# Patient Record
Sex: FEMALE | Race: WHITE | Employment: OTHER | URBAN - NONMETROPOLITAN AREA
[De-identification: names, ages, dates, MRNs, and addresses within clinical notes are randomized per-mention and may not be internally consistent; named-entity substitution may affect disease eponyms.]

---

## 2017-03-15 DIAGNOSIS — J30.2 SEASONAL ALLERGIES: ICD-10-CM

## 2017-03-16 DIAGNOSIS — E78.00 PURE HYPERCHOLESTEROLEMIA: ICD-10-CM

## 2017-03-16 RX ORDER — FLUTICASONE PROPIONATE 50 MCG
SPRAY, SUSPENSION (ML) NASAL
Qty: 48 G | Refills: 2 | Status: SHIPPED | OUTPATIENT
Start: 2017-03-16 | End: 2018-02-13 | Stop reason: SDUPTHER

## 2017-03-16 RX ORDER — ROSUVASTATIN CALCIUM 5 MG/1
TABLET, COATED ORAL
Qty: 30 TABLET | Refills: 11 | Status: SHIPPED | OUTPATIENT
Start: 2017-03-16 | End: 2018-03-14 | Stop reason: SDUPTHER

## 2017-03-22 ENCOUNTER — OFFICE VISIT (OUTPATIENT)
Dept: FAMILY MEDICINE CLINIC | Age: 62
End: 2017-03-22

## 2017-03-22 VITALS
WEIGHT: 136 LBS | DIASTOLIC BLOOD PRESSURE: 86 MMHG | SYSTOLIC BLOOD PRESSURE: 128 MMHG | BODY MASS INDEX: 24.4 KG/M2 | RESPIRATION RATE: 8 BRPM | HEART RATE: 88 BPM

## 2017-03-22 DIAGNOSIS — M54.9 ACUTE RIGHT-SIDED BACK PAIN, UNSPECIFIED BACK LOCATION: Primary | ICD-10-CM

## 2017-03-22 DIAGNOSIS — F41.9 ANXIETY: ICD-10-CM

## 2017-03-22 DIAGNOSIS — I49.8 FLUTTERING HEART: ICD-10-CM

## 2017-03-22 LAB
BILIRUBIN, POC: NEGATIVE
BLOOD URINE, POC: NEGATIVE
CLARITY, POC: CLEAR
COLOR, POC: NORMAL
GLUCOSE URINE, POC: NEGATIVE
KETONES, POC: NEGATIVE
LEUKOCYTE EST, POC: NEGATIVE
NITRITE, POC: NEGATIVE
PH, POC: 7
PROTEIN, POC: NEGATIVE
SPECIFIC GRAVITY, POC: 1.01
UROBILINOGEN, POC: 0.2

## 2017-03-22 PROCEDURE — 81003 URINALYSIS AUTO W/O SCOPE: CPT | Performed by: FAMILY MEDICINE

## 2017-03-22 PROCEDURE — 99214 OFFICE O/P EST MOD 30 MIN: CPT | Performed by: FAMILY MEDICINE

## 2017-03-22 PROCEDURE — 93000 ELECTROCARDIOGRAM COMPLETE: CPT | Performed by: FAMILY MEDICINE

## 2017-03-22 RX ORDER — CETIRIZINE HYDROCHLORIDE 5 MG/1
5 TABLET ORAL DAILY
COMMUNITY

## 2017-03-22 RX ORDER — CITALOPRAM 10 MG/1
10 TABLET ORAL DAILY
Qty: 30 TABLET | Refills: 3 | Status: SHIPPED | OUTPATIENT
Start: 2017-03-22 | End: 2017-06-07 | Stop reason: ALTCHOICE

## 2017-03-22 ASSESSMENT — PATIENT HEALTH QUESTIONNAIRE - PHQ9
SUM OF ALL RESPONSES TO PHQ QUESTIONS 1-9: 1
SUM OF ALL RESPONSES TO PHQ9 QUESTIONS 1 & 2: 1
2. FEELING DOWN, DEPRESSED OR HOPELESS: 1
1. LITTLE INTEREST OR PLEASURE IN DOING THINGS: 0

## 2017-03-24 ENCOUNTER — TELEPHONE (OUTPATIENT)
Dept: FAMILY MEDICINE CLINIC | Age: 62
End: 2017-03-24

## 2017-06-07 ENCOUNTER — OFFICE VISIT (OUTPATIENT)
Dept: FAMILY MEDICINE CLINIC | Age: 62
End: 2017-06-07

## 2017-06-07 VITALS
HEART RATE: 80 BPM | DIASTOLIC BLOOD PRESSURE: 80 MMHG | BODY MASS INDEX: 24.27 KG/M2 | RESPIRATION RATE: 8 BRPM | SYSTOLIC BLOOD PRESSURE: 140 MMHG | WEIGHT: 137 LBS | HEIGHT: 63 IN

## 2017-06-07 DIAGNOSIS — K21.9 GASTROESOPHAGEAL REFLUX DISEASE WITHOUT ESOPHAGITIS: ICD-10-CM

## 2017-06-07 DIAGNOSIS — M41.25 OTHER IDIOPATHIC SCOLIOSIS, THORACOLUMBAR REGION: Primary | ICD-10-CM

## 2017-06-07 DIAGNOSIS — K59.09 OTHER CONSTIPATION: ICD-10-CM

## 2017-06-07 DIAGNOSIS — F41.9 ANXIETY: ICD-10-CM

## 2017-06-07 PROCEDURE — 99214 OFFICE O/P EST MOD 30 MIN: CPT | Performed by: FAMILY MEDICINE

## 2017-06-07 RX ORDER — SUCRALFATE 1 G/1
1 TABLET ORAL 2 TIMES DAILY
Qty: 120 TABLET | Refills: 11 | Status: SHIPPED | OUTPATIENT
Start: 2017-06-07 | End: 2019-09-30 | Stop reason: SDUPTHER

## 2017-09-13 ENCOUNTER — OFFICE VISIT (OUTPATIENT)
Dept: FAMILY MEDICINE CLINIC | Age: 62
End: 2017-09-13
Payer: COMMERCIAL

## 2017-09-13 VITALS
WEIGHT: 137.6 LBS | RESPIRATION RATE: 12 BRPM | BODY MASS INDEX: 24.38 KG/M2 | HEART RATE: 78 BPM | SYSTOLIC BLOOD PRESSURE: 117 MMHG | DIASTOLIC BLOOD PRESSURE: 70 MMHG | HEIGHT: 63 IN

## 2017-09-13 DIAGNOSIS — Z11.59 NEED FOR HEPATITIS C SCREENING TEST: ICD-10-CM

## 2017-09-13 DIAGNOSIS — J30.1 SEASONAL ALLERGIC RHINITIS DUE TO POLLEN: ICD-10-CM

## 2017-09-13 DIAGNOSIS — L30.1 DYSHIDROSIS: ICD-10-CM

## 2017-09-13 DIAGNOSIS — Z23 NEEDS FLU SHOT: ICD-10-CM

## 2017-09-13 DIAGNOSIS — K21.9 GASTROESOPHAGEAL REFLUX DISEASE WITHOUT ESOPHAGITIS: ICD-10-CM

## 2017-09-13 DIAGNOSIS — I10 ESSENTIAL HYPERTENSION: ICD-10-CM

## 2017-09-13 DIAGNOSIS — Z00.00 ROUTINE GENERAL MEDICAL EXAMINATION AT A HEALTH CARE FACILITY: ICD-10-CM

## 2017-09-13 DIAGNOSIS — Z11.4 SCREENING FOR HIV (HUMAN IMMUNODEFICIENCY VIRUS): ICD-10-CM

## 2017-09-13 DIAGNOSIS — H61.23 BILATERAL IMPACTED CERUMEN: ICD-10-CM

## 2017-09-13 DIAGNOSIS — M15.9 PRIMARY OSTEOARTHRITIS INVOLVING MULTIPLE JOINTS: ICD-10-CM

## 2017-09-13 DIAGNOSIS — E78.00 PURE HYPERCHOLESTEROLEMIA: Primary | ICD-10-CM

## 2017-09-13 PROCEDURE — 90686 IIV4 VACC NO PRSV 0.5 ML IM: CPT | Performed by: FAMILY MEDICINE

## 2017-09-13 PROCEDURE — 69209 REMOVE IMPACTED EAR WAX UNI: CPT | Performed by: FAMILY MEDICINE

## 2017-09-13 PROCEDURE — 90471 IMMUNIZATION ADMIN: CPT | Performed by: FAMILY MEDICINE

## 2017-09-13 PROCEDURE — 99214 OFFICE O/P EST MOD 30 MIN: CPT | Performed by: FAMILY MEDICINE

## 2017-09-13 RX ORDER — CLOTRIMAZOLE AND BETAMETHASONE DIPROPIONATE 10; .64 MG/G; MG/G
CREAM TOPICAL
Qty: 30 G | Refills: 2 | Status: SHIPPED | OUTPATIENT
Start: 2017-09-13 | End: 2018-08-24 | Stop reason: ALTCHOICE

## 2017-09-14 ENCOUNTER — NURSE ONLY (OUTPATIENT)
Dept: FAMILY MEDICINE CLINIC | Age: 62
End: 2017-09-14
Payer: COMMERCIAL

## 2017-09-14 DIAGNOSIS — Z00.00 ROUTINE GENERAL MEDICAL EXAMINATION AT A HEALTH CARE FACILITY: ICD-10-CM

## 2017-09-14 DIAGNOSIS — Z11.4 SCREENING FOR HIV (HUMAN IMMUNODEFICIENCY VIRUS): ICD-10-CM

## 2017-09-14 DIAGNOSIS — Z11.59 NEED FOR HEPATITIS C SCREENING TEST: ICD-10-CM

## 2017-09-14 DIAGNOSIS — E78.00 PURE HYPERCHOLESTEROLEMIA: ICD-10-CM

## 2017-09-14 LAB
ALBUMIN SERPL-MCNC: 4.1 G/DL (ref 3.5–5.1)
ALP BLD-CCNC: 58 U/L (ref 38–126)
ALT SERPL-CCNC: 24 U/L (ref 11–66)
ANION GAP SERPL CALCULATED.3IONS-SCNC: 14 MEQ/L (ref 8–16)
AST SERPL-CCNC: 21 U/L (ref 5–40)
BILIRUB SERPL-MCNC: 0.3 MG/DL (ref 0.3–1.2)
BUN BLDV-MCNC: 14 MG/DL (ref 7–22)
CALCIUM SERPL-MCNC: 8.7 MG/DL (ref 8.5–10.5)
CHLORIDE BLD-SCNC: 103 MEQ/L (ref 98–111)
CHOLESTEROL, TOTAL: 176 MG/DL (ref 100–199)
CO2: 24 MEQ/L (ref 23–33)
CREAT SERPL-MCNC: 0.6 MG/DL (ref 0.4–1.2)
GFR SERPL CREATININE-BSD FRML MDRD: > 90 ML/MIN/1.73M2
GLUCOSE BLD-MCNC: 105 MG/DL (ref 70–108)
HCT VFR BLD CALC: 42 % (ref 37–47)
HDLC SERPL-MCNC: 65 MG/DL
HEMOGLOBIN: 14.3 GM/DL (ref 12–16)
HEPATITIS C ANTIBODY: NEGATIVE
LDL CHOLESTEROL CALCULATED: 95 MG/DL
MCH RBC QN AUTO: 31.7 PG (ref 27–31)
MCHC RBC AUTO-ENTMCNC: 34.1 GM/DL (ref 33–37)
MCV RBC AUTO: 92.9 FL (ref 81–99)
PDW BLD-RTO: 13 % (ref 11.5–14.5)
PLATELET # BLD: 205 THOU/MM3 (ref 130–400)
PMV BLD AUTO: 7.6 MCM (ref 7.4–10.4)
POTASSIUM SERPL-SCNC: 4.3 MEQ/L (ref 3.5–5.2)
RBC # BLD: 4.52 MILL/MM3 (ref 4.2–5.4)
SODIUM BLD-SCNC: 141 MEQ/L (ref 135–145)
TOTAL PROTEIN: 6.6 G/DL (ref 6.1–8)
TRIGL SERPL-MCNC: 80 MG/DL (ref 0–199)
TSH SERPL DL<=0.05 MIU/L-ACNC: 1.74 UIU/ML (ref 0.4–4.2)
WBC # BLD: 4.6 THOU/MM3 (ref 4.8–10.8)

## 2017-09-14 PROCEDURE — 36415 COLL VENOUS BLD VENIPUNCTURE: CPT | Performed by: FAMILY MEDICINE

## 2017-09-15 LAB — HIV-2 AB: NEGATIVE

## 2018-03-07 ENCOUNTER — HOSPITAL ENCOUNTER (OUTPATIENT)
Dept: WOMENS IMAGING | Age: 63
Discharge: HOME | End: 2018-03-07
Payer: COMMERCIAL

## 2018-03-07 DIAGNOSIS — Z12.31 VISIT FOR SCREENING MAMMOGRAM: ICD-10-CM

## 2018-03-07 PROCEDURE — 77063 BREAST TOMOSYNTHESIS BI: CPT

## 2018-03-14 ENCOUNTER — OFFICE VISIT (OUTPATIENT)
Dept: FAMILY MEDICINE CLINIC | Age: 63
End: 2018-03-14
Payer: COMMERCIAL

## 2018-03-14 VITALS
OXYGEN SATURATION: 97 % | BODY MASS INDEX: 25.25 KG/M2 | SYSTOLIC BLOOD PRESSURE: 118 MMHG | HEIGHT: 62 IN | RESPIRATION RATE: 16 BRPM | HEART RATE: 81 BPM | WEIGHT: 137.2 LBS | DIASTOLIC BLOOD PRESSURE: 60 MMHG

## 2018-03-14 DIAGNOSIS — I10 ESSENTIAL HYPERTENSION: Primary | ICD-10-CM

## 2018-03-14 DIAGNOSIS — J30.1 CHRONIC SEASONAL ALLERGIC RHINITIS DUE TO POLLEN: ICD-10-CM

## 2018-03-14 DIAGNOSIS — K21.9 GASTROESOPHAGEAL REFLUX DISEASE WITHOUT ESOPHAGITIS: ICD-10-CM

## 2018-03-14 DIAGNOSIS — L30.1 DYSHIDROSIS: ICD-10-CM

## 2018-03-14 DIAGNOSIS — M15.9 PRIMARY OSTEOARTHRITIS INVOLVING MULTIPLE JOINTS: ICD-10-CM

## 2018-03-14 DIAGNOSIS — Z00.00 ROUTINE GENERAL MEDICAL EXAMINATION AT A HEALTH CARE FACILITY: ICD-10-CM

## 2018-03-14 DIAGNOSIS — E78.00 PURE HYPERCHOLESTEROLEMIA: ICD-10-CM

## 2018-03-14 PROCEDURE — G8419 CALC BMI OUT NRM PARAM NOF/U: HCPCS | Performed by: FAMILY MEDICINE

## 2018-03-14 PROCEDURE — 99214 OFFICE O/P EST MOD 30 MIN: CPT | Performed by: FAMILY MEDICINE

## 2018-03-14 PROCEDURE — G8482 FLU IMMUNIZE ORDER/ADMIN: HCPCS | Performed by: FAMILY MEDICINE

## 2018-03-14 PROCEDURE — 3017F COLORECTAL CA SCREEN DOC REV: CPT | Performed by: FAMILY MEDICINE

## 2018-03-14 PROCEDURE — G8427 DOCREV CUR MEDS BY ELIG CLIN: HCPCS | Performed by: FAMILY MEDICINE

## 2018-03-14 PROCEDURE — 1036F TOBACCO NON-USER: CPT | Performed by: FAMILY MEDICINE

## 2018-03-14 PROCEDURE — 3014F SCREEN MAMMO DOC REV: CPT | Performed by: FAMILY MEDICINE

## 2018-03-14 RX ORDER — ROSUVASTATIN CALCIUM 5 MG/1
TABLET, COATED ORAL
Qty: 90 TABLET | Refills: 11 | Status: SHIPPED | OUTPATIENT
Start: 2018-03-14 | End: 2019-07-17 | Stop reason: SINTOL

## 2018-03-15 NOTE — PROGRESS NOTES
pressure. These symptoms are seasonal. Current triggers include exposure to pollens. The patient has been suffering from these symptoms for approximately 3 months. The patient has tried over the counter medications with fair relief of symptoms. Immunotherapy has never been tried. The patient has never had nasal polyps. The patient has no history of asthma. The patient does not suffer from frequent sinopulmonary infections. The patient has not had sinus surgery in the past. The patient has no history of eczema. Arthritis is controlled with NSAIDs prn. Dry hands: Dyshidrosis is controlled with lotrisone but she needs a refill. GERD is controlled on PPI. Reviewed chart for past medical history , surgical history , allergies, social history , family history and medications. Health Maintenance   Topic Date Due    DTaP/Tdap/Td vaccine (1 - Tdap) 06/03/1972    Shingles Vaccine (1 of 2 - 2 Dose Series) 04/02/2018 (Originally 6/3/2003)    Potassium monitoring  09/14/2018    Creatinine monitoring  09/14/2018    Diabetes screen  05/14/2019    Breast cancer screen  03/07/2020    Colon cancer screen colonoscopy  08/02/2022    Lipid screen  09/14/2022    Flu vaccine  Completed    Hepatitis C screen  Completed    HIV screen  Completed       Subjective:      Constitutional: Negative for fever, chills, diaphoresis, activity change, appetite change and fatigue. HENT: Negative for hearing loss, ear pain, congestion, sore throat, rhinorrhea, postnasal drip and ear discharge. Eyes: Negative for photophobia and visual disturbance. Respiratory: Negative for cough, chest tightness, shortness of breath and wheezing. Cardiovascular: Negative for chest pain and leg swelling. Gastrointestinal: Negative for nausea, vomiting, abdominal pain, diarrhea and constipation. Genitourinary: Negative for dysuria, urgency and frequency. Neurological: Negative for weakness, light-headedness and headaches. Psychiatric/Behavioral: Negative for sleep disturbance. Objective:     Vitals:    03/14/18 1035   BP: 118/60   Site: Left Arm   Position: Sitting   Cuff Size: Small Adult   Pulse: 81   Resp: 16   SpO2: 97%   Weight: 137 lb 3.2 oz (62.2 kg)   Height: 5' 2\" (1.575 m)     Wt Readings from Last 3 Encounters:   03/14/18 137 lb 3.2 oz (62.2 kg)   09/13/17 137 lb 9.6 oz (62.4 kg)   06/07/17 137 lb (62.1 kg)       Physical Exam  Physical Exam   Constitutional: Vital signs are normal. She appears well-developed and well-nourished. She is active. HENT:   Head: Normocephalic and atraumatic. Right Ear: Tympanic membrane, external ear and ear canal normal. No drainage or tenderness. Left Ear: Tympanic membrane, external ear and ear canal normal. No drainage or tenderness. Nose: Nose normal. No mucosal edema or rhinorrhea. Mouth/Throat: Uvula is midline, oropharynx is clear and moist and mucous membranes are normal. Mucous membranes are not pale. Normal dentition. No posterior oropharyngeal edema or posterior oropharyngeal erythema. Eyes: Lids are normal. Right eye exhibits no chemosis and no discharge. Left eye exhibits no chemosis and no drainage. Right conjunctiva has no hemorrhage. Left conjunctiva has no hemorrhage. Right eye exhibits normal extraocular motion. Left eye exhibits normal extraocular motion. Right pupil is round and reactive. Left pupil is round and reactive. Pupils are equal.   Cardiovascular: Normal rate, regular rhythm, S1 normal, S2 normal and normal heart sounds. Exam reveals no gallop. No murmur heard. Pulmonary/Chest: Effort normal and breath sounds normal. No respiratory distress. She has no wheezes. She has no rhonchi. She has no rales. Abdominal: Soft. Normal appearance and bowel sounds are normal. She exhibits no distension and no mass. There is no hepatosplenomegaly. No tenderness. She has no rigidity, no rebound and no guarding. No hernia.    Musculoskeletal:        Right

## 2018-04-04 ENCOUNTER — HOSPITAL ENCOUNTER (OUTPATIENT)
Dept: WOMENS IMAGING | Age: 63
Discharge: HOME | End: 2018-04-04
Payer: COMMERCIAL

## 2018-04-04 DIAGNOSIS — R29.890 LOSS OF HEIGHT: ICD-10-CM

## 2018-04-04 DIAGNOSIS — Z13.820 SCREENING FOR OSTEOPOROSIS: ICD-10-CM

## 2018-04-04 DIAGNOSIS — Z82.62 FAM HX-OSTEOPOROSIS: ICD-10-CM

## 2018-04-04 DIAGNOSIS — Z78.0 POSTMENOPAUSAL: ICD-10-CM

## 2018-04-04 PROCEDURE — 77080 DXA BONE DENSITY AXIAL: CPT

## 2018-05-30 ENCOUNTER — NURSE ONLY (OUTPATIENT)
Dept: FAMILY MEDICINE CLINIC | Age: 63
End: 2018-05-30
Payer: COMMERCIAL

## 2018-05-30 DIAGNOSIS — M85.80 OSTEOPENIA DETERMINED BY X-RAY: Primary | ICD-10-CM

## 2018-05-30 LAB — CALCIUM SERPL-MCNC: 9.1 MG/DL (ref 8.5–10.5)

## 2018-05-30 PROCEDURE — 36415 COLL VENOUS BLD VENIPUNCTURE: CPT | Performed by: FAMILY MEDICINE

## 2018-06-01 LAB — VITAMIN D 1,25-DIHYDROXY: 54.2 PG/ML (ref 19.9–79.3)

## 2018-07-03 ENCOUNTER — OFFICE VISIT (OUTPATIENT)
Dept: FAMILY MEDICINE CLINIC | Age: 63
End: 2018-07-03
Payer: MEDICARE

## 2018-07-03 VITALS
HEIGHT: 62 IN | SYSTOLIC BLOOD PRESSURE: 122 MMHG | BODY MASS INDEX: 24.69 KG/M2 | DIASTOLIC BLOOD PRESSURE: 84 MMHG | TEMPERATURE: 97.9 F | OXYGEN SATURATION: 98 % | WEIGHT: 134.2 LBS | HEART RATE: 78 BPM | RESPIRATION RATE: 14 BRPM

## 2018-07-03 DIAGNOSIS — R10.11 ABDOMINAL PAIN, RUQ: Primary | ICD-10-CM

## 2018-07-03 DIAGNOSIS — K59.09 OTHER CONSTIPATION: ICD-10-CM

## 2018-07-03 DIAGNOSIS — E78.00 PURE HYPERCHOLESTEROLEMIA: ICD-10-CM

## 2018-07-03 DIAGNOSIS — M15.9 PRIMARY OSTEOARTHRITIS INVOLVING MULTIPLE JOINTS: ICD-10-CM

## 2018-07-03 DIAGNOSIS — J30.1 CHRONIC SEASONAL ALLERGIC RHINITIS DUE TO POLLEN: ICD-10-CM

## 2018-07-03 DIAGNOSIS — K21.9 GASTROESOPHAGEAL REFLUX DISEASE WITHOUT ESOPHAGITIS: ICD-10-CM

## 2018-07-03 LAB
ALBUMIN SERPL-MCNC: 4.1 G/DL (ref 3.5–5.1)
ALP BLD-CCNC: 59 U/L (ref 38–126)
ALT SERPL-CCNC: 22 U/L (ref 11–66)
AMYLASE: 72 U/L (ref 20–104)
ANION GAP SERPL CALCULATED.3IONS-SCNC: 17 MEQ/L (ref 8–16)
AST SERPL-CCNC: 21 U/L (ref 5–40)
BILIRUB SERPL-MCNC: 0.3 MG/DL (ref 0.3–1.2)
BILIRUBIN URINE: NEGATIVE
BLOOD URINE, POC: NEGATIVE
BUN BLDV-MCNC: 13 MG/DL (ref 7–22)
CALCIUM SERPL-MCNC: 9.8 MG/DL (ref 8.5–10.5)
CHARACTER, URINE: CLEAR
CHLORIDE BLD-SCNC: 103 MEQ/L (ref 98–111)
CO2: 25 MEQ/L (ref 23–33)
COLOR, URINE: YELLOW
CREAT SERPL-MCNC: 0.6 MG/DL (ref 0.4–1.2)
ERYTHROCYTE [DISTWIDTH] IN BLOOD BY AUTOMATED COUNT: 12.5 % (ref 11.5–14.5)
ERYTHROCYTE [DISTWIDTH] IN BLOOD BY AUTOMATED COUNT: 43.2 FL (ref 35–45)
GFR SERPL CREATININE-BSD FRML MDRD: > 90 ML/MIN/1.73M2
GLUCOSE BLD-MCNC: 81 MG/DL (ref 70–108)
GLUCOSE URINE: NEGATIVE MG/DL
HCT VFR BLD CALC: 43.2 % (ref 37–47)
HEMOGLOBIN: 14.4 GM/DL (ref 12–16)
KETONES, URINE: NEGATIVE
LEUKOCYTE CLUMPS, URINE: NEGATIVE
LIPASE: 43.9 U/L (ref 5.6–51.3)
MCH RBC QN AUTO: 31.3 PG (ref 26–33)
MCHC RBC AUTO-ENTMCNC: 33.3 GM/DL (ref 32.2–35.5)
MCV RBC AUTO: 93.9 FL (ref 81–99)
NITRITE, URINE: NEGATIVE
PH, URINE: 6
PLATELET # BLD: 271 THOU/MM3 (ref 130–400)
PMV BLD AUTO: 9.7 FL (ref 9.4–12.4)
POTASSIUM SERPL-SCNC: 4.4 MEQ/L (ref 3.5–5.2)
PROTEIN, URINE: NEGATIVE MG/DL
RBC # BLD: 4.6 MILL/MM3 (ref 4.2–5.4)
SODIUM BLD-SCNC: 145 MEQ/L (ref 135–145)
SPECIFIC GRAVITY, URINE: 1.01 (ref 1–1.03)
TOTAL PROTEIN: 7.1 G/DL (ref 6.1–8)
UROBILINOGEN, URINE: 0.2 EU/DL
WBC # BLD: 7 THOU/MM3 (ref 4.8–10.8)

## 2018-07-03 PROCEDURE — 1036F TOBACCO NON-USER: CPT | Performed by: FAMILY MEDICINE

## 2018-07-03 PROCEDURE — G8399 PT W/DXA RESULTS DOCUMENT: HCPCS | Performed by: FAMILY MEDICINE

## 2018-07-03 PROCEDURE — 81003 URINALYSIS AUTO W/O SCOPE: CPT | Performed by: FAMILY MEDICINE

## 2018-07-03 PROCEDURE — G8427 DOCREV CUR MEDS BY ELIG CLIN: HCPCS | Performed by: FAMILY MEDICINE

## 2018-07-03 PROCEDURE — G8420 CALC BMI NORM PARAMETERS: HCPCS | Performed by: FAMILY MEDICINE

## 2018-07-03 PROCEDURE — 4040F PNEUMOC VAC/ADMIN/RCVD: CPT | Performed by: FAMILY MEDICINE

## 2018-07-03 PROCEDURE — 1123F ACP DISCUSS/DSCN MKR DOCD: CPT | Performed by: FAMILY MEDICINE

## 2018-07-03 PROCEDURE — 99215 OFFICE O/P EST HI 40 MIN: CPT | Performed by: FAMILY MEDICINE

## 2018-07-03 PROCEDURE — 1090F PRES/ABSN URINE INCON ASSESS: CPT | Performed by: FAMILY MEDICINE

## 2018-07-03 PROCEDURE — 3017F COLORECTAL CA SCREEN DOC REV: CPT | Performed by: FAMILY MEDICINE

## 2018-07-03 RX ORDER — OMEPRAZOLE 40 MG/1
40 CAPSULE, DELAYED RELEASE ORAL DAILY
Qty: 30 CAPSULE | Refills: 5 | Status: SHIPPED | OUTPATIENT
Start: 2018-07-03 | End: 2019-07-17 | Stop reason: SDUPTHER

## 2018-07-03 ASSESSMENT — PATIENT HEALTH QUESTIONNAIRE - PHQ9
SUM OF ALL RESPONSES TO PHQ QUESTIONS 1-9: 0
1. LITTLE INTEREST OR PLEASURE IN DOING THINGS: 0
SUM OF ALL RESPONSES TO PHQ9 QUESTIONS 1 & 2: 0
2. FEELING DOWN, DEPRESSED OR HOPELESS: 0

## 2018-07-06 NOTE — PROGRESS NOTES
Giodia Lopez is here for evaluation of possible allergic rhinitis. Patient's symptoms include nasal congestion, postnasal drip and sinus pressure. These symptoms are seasonal. Current triggers include exposure to pollens. The patient has been suffering from these symptoms for approximately 3 months. The patient has tried over the counter medications with fair relief of symptoms. Immunotherapy has never been tried. The patient has never had nasal polyps. The patient has no history of asthma. The patient does not suffer from frequent sinopulmonary infections. The patient has not had sinus surgery in the past. The patient has no history of eczema. Arthritis is controlled with NSAIDs prn. However, she has noticed increased left thumb pain on and off the past year. Occasionally the left great toe will also flare up but gets better with walking and rest later in the day. She is worried about gout but the symptoms are reviewed with her and it is more likely osteoarthritis. GERD is controlled on PPI but she also has a fair amount of constipation leading to nausea and difficulty getting a urinary stream started. Reviewed chart for past medical history , surgical history , allergies, social history , family history and medications. Health Maintenance   Topic Date Due    DTaP/Tdap/Td vaccine (1 - Tdap) 06/03/1972    Shingles Vaccine (1 of 2 - 2 Dose Series) 06/03/2003    Pneumococcal low/med risk (1 of 2 - PCV13) 06/03/2018    Flu vaccine (1) 09/01/2018    Potassium monitoring  07/03/2019    Creatinine monitoring  07/03/2019    Breast cancer screen  03/07/2020    Colon cancer screen colonoscopy  08/02/2022    Lipid screen  09/14/2022    DEXA (modify frequency per FRAX score)  Completed    Hepatitis C screen  Completed    HIV screen  Completed       Subjective:      Constitutional: Negative for fever, chills, diaphoresis, activity change, appetite change and fatigue.    HENT: Negative for hearing loss, ear pain, congestion, sore throat, rhinorrhea, postnasal drip and ear discharge. Eyes: Negative for photophobia and visual disturbance. Respiratory: Negative for cough, chest tightness, shortness of breath and wheezing. Cardiovascular: Negative for chest pain and leg swelling. Gastrointestinal: Negative for nausea, vomiting, abdominal pain, diarrhea and constipation. Genitourinary: Negative for dysuria, urgency and frequency. Neurological: Negative for weakness, light-headedness and headaches. Psychiatric/Behavioral: Negative for sleep disturbance. Objective:     Vitals:    07/03/18 1310   BP: 122/84   Site: Left Arm   Position: Sitting   Cuff Size: Medium Adult   Pulse: 78   Resp: 14   Temp: 97.9 °F (36.6 °C)   TempSrc: Temporal   SpO2: 98%   Weight: 134 lb 3.2 oz (60.9 kg)   Height: 5' 2.01\" (1.575 m)     Wt Readings from Last 3 Encounters:   07/03/18 134 lb 3.2 oz (60.9 kg)   03/14/18 137 lb 3.2 oz (62.2 kg)   09/13/17 137 lb 9.6 oz (62.4 kg)       Physical Exam  Physical Exam   Constitutional: Vital signs are normal. She appears well-developed and well-nourished. She is active. HENT:   Head: Normocephalic and atraumatic. Right Ear: Tympanic membrane, external ear and ear canal normal. No drainage or tenderness. Left Ear: Tympanic membrane, external ear and ear canal normal. No drainage or tenderness. Nose: Nose normal. No mucosal edema or rhinorrhea. Mouth/Throat: Uvula is midline, oropharynx is clear and moist and mucous membranes are normal. Mucous membranes are not pale. Normal dentition. No posterior oropharyngeal edema or posterior oropharyngeal erythema. Eyes: Lids are normal. Right eye exhibits no chemosis and no discharge. Left eye exhibits no chemosis and no drainage. Right conjunctiva has no hemorrhage. Left conjunctiva has no hemorrhage. Right eye exhibits normal extraocular motion. Left eye exhibits normal extraocular motion. Right pupil is round and reactive. Left pupil is round and reactive. Pupils are equal.   Cardiovascular: Normal rate, regular rhythm, S1 normal, S2 normal and normal heart sounds. Exam reveals no gallop. No murmur heard. Pulmonary/Chest: Effort normal and breath sounds normal. No respiratory distress. She has no wheezes. She has no rhonchi. She has no rales. Abdominal: Soft. Normal appearance and bowel sounds are normal. She exhibits no distension and no mass. There is no hepatosplenomegaly. Moderate to severe RUQ tenderness. She has no rigidity, no rebound and no guarding. No hernia. Musculoskeletal:        Right lower leg: She exhibits no edema. Left lower leg: She exhibits no edema. Neurological: She is alert. Assessment/Plan   Emerita Christopher was seen today for abdominal pain. Diagnoses and all orders for this visit:    Abdominal pain, RUQ  -     XR ABDOMEN (2 VIEWS); Future  -     CBC; Future  -     Comprehensive Metabolic Panel; Future  -     US Gallbladder Ruq; Future  -     Amylase; Future  -     Lipase; Future  -     [facility] MD (RYAN)  -     POCT Urinalysis No Micro (Auto)  -     CBC  -     Comprehensive Metabolic Panel  -     Amylase  -     Lipase    Other constipation  -     XR ABDOMEN (2 VIEWS); Future  -     linaclotide (LINZESS) 145 MCG capsule; Take 1 capsule by mouth every morning (before breakfast)    Primary osteoarthritis involving multiple joints    Gastroesophageal reflux disease without esophagitis  -     omeprazole (PRILOSEC) 40 MG delayed release capsule; Take 1 capsule by mouth daily    Pure hypercholesterolemia    Chronic seasonal allergic rhinitis due to pollen    Other orders  -     Anion Gap  -     Glomerular Filtration Rate, Estimated    restart linzess for constipation and refer to GI  Continue healthy diet and exercise  Aspirin daily    May need to consider PT for the back pain again. And/or breast reduction.     Discussed use, benefit, and side effects of prescribed medications. All patient questions answered. Pt voiced understanding. Reviewed health maintenance. Instructed to continue current medications, diet and exercise. Patient agreed with treatment plan. Follow up as directed.      Over 40 minutes spent with patient with >50% spent in counseling and coordination of care      Electronically signed by Lukas Grande MD

## 2018-07-12 ENCOUNTER — HOSPITAL ENCOUNTER (OUTPATIENT)
Dept: GENERAL RADIOLOGY | Age: 63
Discharge: HOME | End: 2018-07-12
Payer: MEDICARE

## 2018-07-12 ENCOUNTER — HOSPITAL ENCOUNTER (OUTPATIENT)
Dept: ULTRASOUND IMAGING | Age: 63
Discharge: HOME | End: 2018-07-12
Payer: MEDICARE

## 2018-07-12 ENCOUNTER — HOSPITAL ENCOUNTER (OUTPATIENT)
Age: 63
Discharge: HOME | End: 2018-07-12
Payer: MEDICARE

## 2018-07-12 DIAGNOSIS — K59.09 OTHER CONSTIPATION: ICD-10-CM

## 2018-07-12 DIAGNOSIS — R10.11 ABDOMINAL PAIN, RUQ: ICD-10-CM

## 2018-07-12 PROCEDURE — 74019 RADEX ABDOMEN 2 VIEWS: CPT

## 2018-07-12 PROCEDURE — 76705 ECHO EXAM OF ABDOMEN: CPT

## 2018-08-24 ENCOUNTER — OFFICE VISIT (OUTPATIENT)
Dept: FAMILY MEDICINE CLINIC | Age: 63
End: 2018-08-24
Payer: MEDICARE

## 2018-08-24 VITALS
WEIGHT: 132.6 LBS | RESPIRATION RATE: 12 BRPM | HEIGHT: 62 IN | BODY MASS INDEX: 24.4 KG/M2 | HEART RATE: 80 BPM | SYSTOLIC BLOOD PRESSURE: 112 MMHG | DIASTOLIC BLOOD PRESSURE: 70 MMHG

## 2018-08-24 DIAGNOSIS — K21.9 GASTROESOPHAGEAL REFLUX DISEASE WITHOUT ESOPHAGITIS: ICD-10-CM

## 2018-08-24 DIAGNOSIS — R94.31 ABNORMAL EKG: ICD-10-CM

## 2018-08-24 DIAGNOSIS — M15.9 PRIMARY OSTEOARTHRITIS INVOLVING MULTIPLE JOINTS: ICD-10-CM

## 2018-08-24 DIAGNOSIS — J30.2 SEASONAL ALLERGIES: ICD-10-CM

## 2018-08-24 DIAGNOSIS — R00.9 ABNORMAL HEART RATE: Primary | ICD-10-CM

## 2018-08-24 DIAGNOSIS — Z00.00 ROUTINE GENERAL MEDICAL EXAMINATION AT A HEALTH CARE FACILITY: ICD-10-CM

## 2018-08-24 DIAGNOSIS — E78.00 PURE HYPERCHOLESTEROLEMIA: ICD-10-CM

## 2018-08-24 DIAGNOSIS — I10 ESSENTIAL HYPERTENSION: ICD-10-CM

## 2018-08-24 DIAGNOSIS — I34.1 MVP (MITRAL VALVE PROLAPSE): ICD-10-CM

## 2018-08-24 DIAGNOSIS — K59.01 SLOW TRANSIT CONSTIPATION: ICD-10-CM

## 2018-08-24 PROCEDURE — 99215 OFFICE O/P EST HI 40 MIN: CPT | Performed by: FAMILY MEDICINE

## 2018-08-24 PROCEDURE — 93000 ELECTROCARDIOGRAM COMPLETE: CPT | Performed by: FAMILY MEDICINE

## 2018-08-24 PROCEDURE — 3017F COLORECTAL CA SCREEN DOC REV: CPT | Performed by: FAMILY MEDICINE

## 2018-08-24 PROCEDURE — G8427 DOCREV CUR MEDS BY ELIG CLIN: HCPCS | Performed by: FAMILY MEDICINE

## 2018-08-24 PROCEDURE — 1123F ACP DISCUSS/DSCN MKR DOCD: CPT | Performed by: FAMILY MEDICINE

## 2018-08-24 PROCEDURE — 1090F PRES/ABSN URINE INCON ASSESS: CPT | Performed by: FAMILY MEDICINE

## 2018-08-24 PROCEDURE — 1101F PT FALLS ASSESS-DOCD LE1/YR: CPT | Performed by: FAMILY MEDICINE

## 2018-08-24 PROCEDURE — 1036F TOBACCO NON-USER: CPT | Performed by: FAMILY MEDICINE

## 2018-08-24 PROCEDURE — G8399 PT W/DXA RESULTS DOCUMENT: HCPCS | Performed by: FAMILY MEDICINE

## 2018-08-24 PROCEDURE — 4040F PNEUMOC VAC/ADMIN/RCVD: CPT | Performed by: FAMILY MEDICINE

## 2018-08-24 PROCEDURE — G8420 CALC BMI NORM PARAMETERS: HCPCS | Performed by: FAMILY MEDICINE

## 2018-08-24 RX ORDER — ASCORBIC ACID 500 MG
500 TABLET ORAL
COMMUNITY
End: 2021-02-17

## 2018-08-24 RX ORDER — POLYETHYLENE GLYCOL 3350 17 G/17G
17 POWDER, FOR SOLUTION ORAL DAILY
COMMUNITY
End: 2019-11-06 | Stop reason: ALTCHOICE

## 2018-08-24 NOTE — PROGRESS NOTES
Patient agreed with treatment plan. Follow up as directed.      Over 40 minutes spent with patient with >50% spent in counseling and coordination of care    Electronically signed by Alcira Ybarra MD

## 2018-08-27 ENCOUNTER — TELEPHONE (OUTPATIENT)
Dept: FAMILY MEDICINE CLINIC | Age: 63
End: 2018-08-27

## 2018-08-28 ENCOUNTER — HOSPITAL ENCOUNTER (OUTPATIENT)
Dept: NON INVASIVE DIAGNOSTICS | Age: 63
Discharge: HOME | End: 2018-08-28
Payer: MEDICARE

## 2018-08-28 DIAGNOSIS — R00.9 ABNORMAL HEART RATE: ICD-10-CM

## 2018-08-28 DIAGNOSIS — R94.31 ABNORMAL EKG: ICD-10-CM

## 2018-08-28 DIAGNOSIS — I34.1 MVP (MITRAL VALVE PROLAPSE): ICD-10-CM

## 2018-08-28 LAB
LV EF: 60 %
LVEF MODALITY: NORMAL

## 2018-08-28 PROCEDURE — A9500 TC99M SESTAMIBI: HCPCS | Performed by: FAMILY MEDICINE

## 2018-08-28 PROCEDURE — 2709999900 HC NON-CHARGEABLE SUPPLY

## 2018-08-28 PROCEDURE — 78452 HT MUSCLE IMAGE SPECT MULT: CPT

## 2018-08-28 PROCEDURE — 3430000000 HC RX DIAGNOSTIC RADIOPHARMACEUTICAL: Performed by: FAMILY MEDICINE

## 2018-08-28 PROCEDURE — 93017 CV STRESS TEST TRACING ONLY: CPT

## 2018-08-28 PROCEDURE — 93306 TTE W/DOPPLER COMPLETE: CPT

## 2018-08-28 RX ADMIN — Medication 10.6 MILLICURIE: at 07:06

## 2018-08-28 RX ADMIN — Medication 34.4 MILLICURIE: at 08:20

## 2018-09-12 ENCOUNTER — HOSPITAL ENCOUNTER (EMERGENCY)
Age: 63
Discharge: HOME | End: 2018-09-12
Attending: EMERGENCY MEDICINE
Payer: MEDICARE

## 2018-09-12 ENCOUNTER — APPOINTMENT (OUTPATIENT)
Dept: CT IMAGING | Age: 63
End: 2018-09-12
Payer: MEDICARE

## 2018-09-12 ENCOUNTER — APPOINTMENT (OUTPATIENT)
Dept: GENERAL RADIOLOGY | Age: 63
End: 2018-09-12
Payer: MEDICARE

## 2018-09-12 ENCOUNTER — TELEPHONE (OUTPATIENT)
Dept: FAMILY MEDICINE CLINIC | Age: 63
End: 2018-09-12

## 2018-09-12 VITALS
WEIGHT: 135 LBS | RESPIRATION RATE: 16 BRPM | HEART RATE: 78 BPM | DIASTOLIC BLOOD PRESSURE: 73 MMHG | TEMPERATURE: 99 F | HEIGHT: 62 IN | OXYGEN SATURATION: 95 % | BODY MASS INDEX: 24.84 KG/M2 | SYSTOLIC BLOOD PRESSURE: 137 MMHG

## 2018-09-12 DIAGNOSIS — S16.1XXA STRAIN OF NECK MUSCLE, INITIAL ENCOUNTER: ICD-10-CM

## 2018-09-12 DIAGNOSIS — S09.90XA CLOSED HEAD INJURY, INITIAL ENCOUNTER: Primary | ICD-10-CM

## 2018-09-12 PROCEDURE — 70450 CT HEAD/BRAIN W/O DYE: CPT

## 2018-09-12 PROCEDURE — 99284 EMERGENCY DEPT VISIT MOD MDM: CPT

## 2018-09-12 PROCEDURE — 72040 X-RAY EXAM NECK SPINE 2-3 VW: CPT

## 2018-09-12 PROCEDURE — 6370000000 HC RX 637 (ALT 250 FOR IP): Performed by: EMERGENCY MEDICINE

## 2018-09-12 RX ORDER — ACETAMINOPHEN 500 MG
1000 TABLET ORAL ONCE
Status: COMPLETED | OUTPATIENT
Start: 2018-09-12 | End: 2018-09-12

## 2018-09-12 RX ADMIN — ACETAMINOPHEN 1000 MG: 500 TABLET, FILM COATED ORAL at 09:55

## 2018-09-12 ASSESSMENT — PAIN DESCRIPTION - DESCRIPTORS
DESCRIPTORS: ACHING
DESCRIPTORS: ACHING

## 2018-09-12 ASSESSMENT — PAIN DESCRIPTION - PAIN TYPE
TYPE: ACUTE PAIN
TYPE: ACUTE PAIN

## 2018-09-12 ASSESSMENT — ENCOUNTER SYMPTOMS
SHORTNESS OF BREATH: 0
EYE DISCHARGE: 0
EYE PAIN: 0
NAUSEA: 0
VOMITING: 0
BACK PAIN: 0
WHEEZING: 0
DIARRHEA: 0
SORE THROAT: 0
ABDOMINAL PAIN: 0

## 2018-09-12 ASSESSMENT — PAIN SCALES - GENERAL
PAINLEVEL_OUTOF10: 5
PAINLEVEL_OUTOF10: 4

## 2018-09-12 ASSESSMENT — PAIN DESCRIPTION - FREQUENCY: FREQUENCY: CONTINUOUS

## 2018-09-12 ASSESSMENT — PAIN DESCRIPTION - LOCATION
LOCATION: HEAD
LOCATION: HEAD

## 2018-09-12 NOTE — TELEPHONE ENCOUNTER
Patient called office c/o head injury from fall yesterday 9-11-18 with no loss of consciousness. Symptoms today are headache and blurry vision, no nausea. Per Dr Shantel Mortensen, advised patient to go to ER as soon as possible.

## 2018-09-12 NOTE — ED TRIAGE NOTES
Pt fell face first onto cement hitting her head. Pt denies LOC. Bruising and slight edema noted of forehead. PERRL, strong and equal grasps, no drift. Pain is mostly of right side of head. Denies vomiting.

## 2018-09-12 NOTE — ED PROVIDER NOTES
maternal grandmother is . She indicated that her maternal grandfather is . She indicated that her paternal grandmother is . She indicated that her paternal grandfather is . She indicated that the status of her paternal uncle is unknown.    family history includes Alzheimer's Disease in her paternal grandfather and paternal uncle; Cancer (age of onset: 80) in her maternal grandfather; Diabetes in her brother and sister; Glaucoma in her maternal grandfather; Heart Disease in her maternal grandmother; Heart Disease (age of onset: 61) in her father and mother; High Blood Pressure in her brother; High Cholesterol in her brother; Kidney Disease in her father; Other in her maternal grandmother; Rheum Arthritis in her paternal grandmother; Stroke (age of onset: 68) in her paternal grandmother; Thyroid Disease in her mother. SOCIAL HISTORY      reports that she has never smoked. She has never used smokeless tobacco. She reports that she drinks alcohol. She reports that she does not use drugs. PHYSICAL EXAM     INITIAL VITALS:  height is 5' 2\" (1.575 m) and weight is 135 lb (61.2 kg). Her temporal temperature is 99 °F (37.2 °C). Her blood pressure is 137/73 and her pulse is 78. Her respiration is 16 and oxygen saturation is 95%. Physical Exam   Constitutional: She is oriented to person, place, and time. She appears well-developed and well-nourished. She appears distressed. She looks slightly uncomfortable   HENT:   There is a 5 x 4 cm area of ecchymosis, mild swelling and tenderness at  the central forehead, is no laceration or palpable deformity. Eyes: Pupils are equal, round, and reactive to light. Conjunctivae and EOM are normal.   Neck:   There is minimal tenderness to the lower cervical spine. Palpation   Cardiovascular: Normal rate, regular rhythm, normal heart sounds and intact distal pulses.     Pulmonary/Chest: Effort normal and breath sounds normal. She exhibits no tenderness. Abdominal: Soft. Bowel sounds are normal. She exhibits no mass. There is no tenderness. There is no guarding. Musculoskeletal:   No T or L spine tenderness, full range of motion of lower extremities induced no pain   Neurological: She is alert and oriented to person, place, and time. No cranial nerve deficit. Coordination normal.   Nursing note and vitals reviewed. DIAGNOSTIC RESULTS         RADIOLOGY:   Head CT without contrast was unremarkable. C-spine x-ray was unremarkable  LABS:   Labs Reviewed - No data to display    EMERGENCY DEPARTMENT COURSE:   Vitals:    Vitals:    09/12/18 0940   BP: 137/73   Pulse: 78   Resp: 16   Temp: 99 °F (37.2 °C)   TempSrc: Temporal   SpO2: 95%   Weight: 135 lb (61.2 kg)   Height: 5' 2\" (1.575 m)     She was given Tylenol 1 g by mouth,   Reevaluation at 10:36 AM:   She looks comfortable,she reported improvement. FINAL IMPRESSION      1. Closed head injury, initial encounter    2. Strain of neck muscle, initial encounter          DISPOSITION/PLAN   She was discharged home in stable condition, advised to take Tylenol or ibuprofen for headache, she was given discharge instructions Concerning head and cervical spine injuries, She was advised to return if worse or  new symptoms.   PATIENT REFERRED TO:  [address]  [address] [place] [address]  [phone]    In 2 days        DISCHARGE MEDICATIONS:  New Prescriptions    No medications on file       (Please note that portions of this note were completed with a voice recognition program.  Efforts were made to edit the dictations but occasionally words are mis-transcribed.)    MD Gary Reynaga MD  09/12/18 [id number]

## 2018-09-12 NOTE — TELEPHONE ENCOUNTER
Pt was seen in ER today for a fall she sustained yesterday - pt missed a step and fell onto concrete - c/o headache and blurry vision - will call pt in a couple of wks regarding Reclast if pt does not call office

## 2018-09-12 NOTE — ED NOTES
Pt pink, warm and dry, breathing with ease. Ice encouraged, pt states understanding. AVS reviewed including follow up appointments, diagnosis, and care of self at home. Denies questions or concerns. Pt remains alert and oriented. Pt discharged in stable condition.       Arti Stubbs RN  09/12/18 [id number]

## 2018-09-14 ENCOUNTER — OFFICE VISIT (OUTPATIENT)
Dept: FAMILY MEDICINE CLINIC | Age: 63
End: 2018-09-14
Payer: MEDICARE

## 2018-09-14 VITALS
HEART RATE: 64 BPM | SYSTOLIC BLOOD PRESSURE: 126 MMHG | TEMPERATURE: 97.8 F | DIASTOLIC BLOOD PRESSURE: 78 MMHG | HEIGHT: 62 IN | RESPIRATION RATE: 16 BRPM | WEIGHT: 134.2 LBS | BODY MASS INDEX: 24.69 KG/M2

## 2018-09-14 DIAGNOSIS — F07.81 POSTCONCUSSION SYNDROME: ICD-10-CM

## 2018-09-14 DIAGNOSIS — M62.838 NECK MUSCLE SPASM: ICD-10-CM

## 2018-09-14 DIAGNOSIS — S09.90XD TRAUMATIC INJURY OF HEAD, SUBSEQUENT ENCOUNTER: Primary | ICD-10-CM

## 2018-09-14 PROCEDURE — 3017F COLORECTAL CA SCREEN DOC REV: CPT | Performed by: FAMILY MEDICINE

## 2018-09-14 PROCEDURE — 1123F ACP DISCUSS/DSCN MKR DOCD: CPT | Performed by: FAMILY MEDICINE

## 2018-09-14 PROCEDURE — 1090F PRES/ABSN URINE INCON ASSESS: CPT | Performed by: FAMILY MEDICINE

## 2018-09-14 PROCEDURE — G8427 DOCREV CUR MEDS BY ELIG CLIN: HCPCS | Performed by: FAMILY MEDICINE

## 2018-09-14 PROCEDURE — 99213 OFFICE O/P EST LOW 20 MIN: CPT | Performed by: FAMILY MEDICINE

## 2018-09-14 PROCEDURE — 4040F PNEUMOC VAC/ADMIN/RCVD: CPT | Performed by: FAMILY MEDICINE

## 2018-09-14 PROCEDURE — 1036F TOBACCO NON-USER: CPT | Performed by: FAMILY MEDICINE

## 2018-09-14 PROCEDURE — G8399 PT W/DXA RESULTS DOCUMENT: HCPCS | Performed by: FAMILY MEDICINE

## 2018-09-14 PROCEDURE — G8420 CALC BMI NORM PARAMETERS: HCPCS | Performed by: FAMILY MEDICINE

## 2018-09-14 PROCEDURE — 1101F PT FALLS ASSESS-DOCD LE1/YR: CPT | Performed by: FAMILY MEDICINE

## 2018-09-14 RX ORDER — CYCLOBENZAPRINE HCL 5 MG
10 TABLET ORAL 2 TIMES DAILY PRN
Qty: 60 TABLET | Refills: 1 | Status: SHIPPED | OUTPATIENT
Start: 2018-09-14 | End: 2018-09-24

## 2018-10-01 ENCOUNTER — TELEPHONE (OUTPATIENT)
Dept: FAMILY MEDICINE CLINIC | Age: 63
End: 2018-10-01

## 2018-10-22 ENCOUNTER — TELEPHONE (OUTPATIENT)
Dept: FAMILY MEDICINE CLINIC | Age: 63
End: 2018-10-22

## 2018-11-02 ENCOUNTER — HOSPITAL ENCOUNTER (OUTPATIENT)
Dept: GENERAL RADIOLOGY | Age: 63
Discharge: HOME | End: 2018-11-02
Payer: MEDICARE

## 2018-11-02 VITALS
TEMPERATURE: 97.6 F | BODY MASS INDEX: 24.66 KG/M2 | HEIGHT: 62 IN | RESPIRATION RATE: 16 BRPM | OXYGEN SATURATION: 98 % | DIASTOLIC BLOOD PRESSURE: 53 MMHG | WEIGHT: 134 LBS | HEART RATE: 63 BPM | SYSTOLIC BLOOD PRESSURE: 112 MMHG

## 2018-11-02 PROCEDURE — 96367 TX/PROPH/DG ADDL SEQ IV INF: CPT

## 2018-11-02 PROCEDURE — 96365 THER/PROPH/DIAG IV INF INIT: CPT

## 2018-11-02 PROCEDURE — 6360000002 HC RX W HCPCS: Performed by: FAMILY MEDICINE

## 2018-11-02 PROCEDURE — 2709999900 HC NON-CHARGEABLE SUPPLY

## 2018-11-02 RX ORDER — ZOLEDRONIC ACID 5 MG/100ML
5 INJECTION, SOLUTION INTRAVENOUS ONCE
Status: COMPLETED | OUTPATIENT
Start: 2018-11-02 | End: 2018-11-02

## 2018-11-02 RX ADMIN — ZOLEDRONIC ACID 5 MG: 5 INJECTION, SOLUTION INTRAVENOUS at 13:41

## 2018-11-02 NOTE — PROGRESS NOTES
IV infused and discontinued. Pt is pink, warm and dry, breathing with ease. Mucous membranes are pink and moist. Pt is alert and oriented. No nausea or vomiting. Walking without difficulty. Pt is calm and cooperative, no complaints. Pt released in stable condition.

## 2018-11-07 ENCOUNTER — TELEPHONE (OUTPATIENT)
Dept: FAMILY MEDICINE CLINIC | Age: 63
End: 2018-11-07

## 2019-03-07 ENCOUNTER — OFFICE VISIT (OUTPATIENT)
Dept: FAMILY MEDICINE CLINIC | Age: 64
End: 2019-03-07
Payer: MEDICARE

## 2019-03-07 VITALS
HEART RATE: 65 BPM | SYSTOLIC BLOOD PRESSURE: 120 MMHG | TEMPERATURE: 97.5 F | WEIGHT: 135 LBS | RESPIRATION RATE: 16 BRPM | BODY MASS INDEX: 24.69 KG/M2 | DIASTOLIC BLOOD PRESSURE: 78 MMHG

## 2019-03-07 DIAGNOSIS — M75.42 IMPINGEMENT SYNDROME OF LEFT SHOULDER: Primary | ICD-10-CM

## 2019-03-07 DIAGNOSIS — M75.112 INCOMPLETE TEAR OF LEFT ROTATOR CUFF: ICD-10-CM

## 2019-03-07 PROCEDURE — 3017F COLORECTAL CA SCREEN DOC REV: CPT | Performed by: NURSE PRACTITIONER

## 2019-03-07 PROCEDURE — 1090F PRES/ABSN URINE INCON ASSESS: CPT | Performed by: NURSE PRACTITIONER

## 2019-03-07 PROCEDURE — 1123F ACP DISCUSS/DSCN MKR DOCD: CPT | Performed by: NURSE PRACTITIONER

## 2019-03-07 PROCEDURE — G8484 FLU IMMUNIZE NO ADMIN: HCPCS | Performed by: NURSE PRACTITIONER

## 2019-03-07 PROCEDURE — G8420 CALC BMI NORM PARAMETERS: HCPCS | Performed by: NURSE PRACTITIONER

## 2019-03-07 PROCEDURE — 4040F PNEUMOC VAC/ADMIN/RCVD: CPT | Performed by: NURSE PRACTITIONER

## 2019-03-07 PROCEDURE — G8427 DOCREV CUR MEDS BY ELIG CLIN: HCPCS | Performed by: NURSE PRACTITIONER

## 2019-03-07 PROCEDURE — 99213 OFFICE O/P EST LOW 20 MIN: CPT | Performed by: NURSE PRACTITIONER

## 2019-03-07 PROCEDURE — G8399 PT W/DXA RESULTS DOCUMENT: HCPCS | Performed by: NURSE PRACTITIONER

## 2019-03-07 PROCEDURE — 1101F PT FALLS ASSESS-DOCD LE1/YR: CPT | Performed by: NURSE PRACTITIONER

## 2019-03-07 PROCEDURE — 1036F TOBACCO NON-USER: CPT | Performed by: NURSE PRACTITIONER

## 2019-03-07 ASSESSMENT — PATIENT HEALTH QUESTIONNAIRE - PHQ9
1. LITTLE INTEREST OR PLEASURE IN DOING THINGS: 0
SUM OF ALL RESPONSES TO PHQ QUESTIONS 1-9: 0
SUM OF ALL RESPONSES TO PHQ QUESTIONS 1-9: 0
SUM OF ALL RESPONSES TO PHQ9 QUESTIONS 1 & 2: 0
2. FEELING DOWN, DEPRESSED OR HOPELESS: 0

## 2019-03-07 ASSESSMENT — ENCOUNTER SYMPTOMS
RESPIRATORY NEGATIVE: 1
EYES NEGATIVE: 1
GASTROINTESTINAL NEGATIVE: 1

## 2019-04-10 DIAGNOSIS — J30.2 SEASONAL ALLERGIES: ICD-10-CM

## 2019-04-10 RX ORDER — FLUTICASONE PROPIONATE 50 MCG
SPRAY, SUSPENSION (ML) NASAL
Qty: 48 G | Refills: 5 | Status: SHIPPED | OUTPATIENT
Start: 2019-04-10 | End: 2020-05-20

## 2019-05-11 LAB
ALBUMIN SERPL-MCNC: 4.1 G/DL
ALP BLD-CCNC: 38 U/L
ALT SERPL-CCNC: 26 U/L
ANION GAP SERPL CALCULATED.3IONS-SCNC: NORMAL MMOL/L
AST SERPL-CCNC: 19 U/L
AVERAGE GLUCOSE: 108
BASOPHILS ABSOLUTE: NORMAL /ΜL
BASOPHILS RELATIVE PERCENT: 0.9 %
BILIRUB SERPL-MCNC: 0.5 MG/DL (ref 0.1–1.4)
BILIRUBIN DIRECT: 0.1 MG/DL
BUN BLDV-MCNC: 17 MG/DL
CALCIUM SERPL-MCNC: 9.3 MG/DL
CHLORIDE BLD-SCNC: 104 MMOL/L
CHOLESTEROL, TOTAL: 211 MG/DL
CHOLESTEROL/HDL RATIO: 2.2
CO2: 28 MMOL/L
CREAT SERPL-MCNC: 0.6 MG/DL
EOSINOPHILS ABSOLUTE: 0.1 /ΜL
EOSINOPHILS RELATIVE PERCENT: 2.1 %
GFR CALCULATED: 100
GLUCOSE BLD-MCNC: 73 MG/DL
HBA1C MFR BLD: 5.4 %
HCT VFR BLD CALC: 42.7 % (ref 36–46)
HDLC SERPL-MCNC: 59 MG/DL (ref 35–70)
HEMOGLOBIN: 14.3 G/DL (ref 12–16)
IRON: 122
LDL CHOLESTEROL CALCULATED: 132 MG/DL (ref 0–160)
LYMPHOCYTES ABSOLUTE: 1.6 /ΜL
LYMPHOCYTES RELATIVE PERCENT: 31.6 %
MCH RBC QN AUTO: 31.4 PG
MCHC RBC AUTO-ENTMCNC: 33.6 G/DL
MCV RBC AUTO: 93.6 FL
MONOCYTES ABSOLUTE: 0.4 /ΜL
MONOCYTES RELATIVE PERCENT: 8.8 %
NEUTROPHILS ABSOLUTE: 2.8 /ΜL
NEUTROPHILS RELATIVE PERCENT: 56.6 %
PDW BLD-RTO: 13.2 %
PHOSPHORUS: 2.9 MG/DL
PLATELET # BLD: 255 K/ΜL
PMV BLD AUTO: 7.7 FL
POTASSIUM SERPL-SCNC: 4.1 MMOL/L
RBC # BLD: 4.56 10^6/ΜL
SODIUM BLD-SCNC: 140 MMOL/L
TOTAL PROTEIN: 6.6
TRIGL SERPL-MCNC: 101 MG/DL
TSH SERPL DL<=0.05 MIU/L-ACNC: 1.46 UIU/ML
URIC ACID: 5.4
VITAMIN D 25-HYDROXY: 43
VITAMIN D2, 25 HYDROXY: NORMAL
VITAMIN D3,25 HYDROXY: NORMAL
VLDLC SERPL CALC-MCNC: 20 MG/DL
WBC # BLD: 5 10^3/ML

## 2019-07-02 ENCOUNTER — OFFICE VISIT (OUTPATIENT)
Dept: FAMILY MEDICINE CLINIC | Age: 64
End: 2019-07-02
Payer: MEDICARE

## 2019-07-02 VITALS
HEIGHT: 62 IN | DIASTOLIC BLOOD PRESSURE: 78 MMHG | WEIGHT: 137 LBS | RESPIRATION RATE: 14 BRPM | HEART RATE: 88 BPM | SYSTOLIC BLOOD PRESSURE: 118 MMHG | TEMPERATURE: 96.4 F | BODY MASS INDEX: 25.21 KG/M2

## 2019-07-02 DIAGNOSIS — H61.23 BILATERAL IMPACTED CERUMEN: Primary | ICD-10-CM

## 2019-07-02 DIAGNOSIS — H60.391 OTHER INFECTIVE ACUTE OTITIS EXTERNA OF RIGHT EAR: ICD-10-CM

## 2019-07-02 PROCEDURE — 99213 OFFICE O/P EST LOW 20 MIN: CPT | Performed by: FAMILY MEDICINE

## 2019-07-02 PROCEDURE — 1090F PRES/ABSN URINE INCON ASSESS: CPT | Performed by: FAMILY MEDICINE

## 2019-07-02 PROCEDURE — 4130F TOPICAL PREP RX AOE: CPT | Performed by: FAMILY MEDICINE

## 2019-07-02 PROCEDURE — 69209 REMOVE IMPACTED EAR WAX UNI: CPT | Performed by: FAMILY MEDICINE

## 2019-07-02 PROCEDURE — 1123F ACP DISCUSS/DSCN MKR DOCD: CPT | Performed by: FAMILY MEDICINE

## 2019-07-02 PROCEDURE — G8399 PT W/DXA RESULTS DOCUMENT: HCPCS | Performed by: FAMILY MEDICINE

## 2019-07-02 PROCEDURE — G8427 DOCREV CUR MEDS BY ELIG CLIN: HCPCS | Performed by: FAMILY MEDICINE

## 2019-07-02 PROCEDURE — G8419 CALC BMI OUT NRM PARAM NOF/U: HCPCS | Performed by: FAMILY MEDICINE

## 2019-07-02 PROCEDURE — 3017F COLORECTAL CA SCREEN DOC REV: CPT | Performed by: FAMILY MEDICINE

## 2019-07-02 PROCEDURE — 4040F PNEUMOC VAC/ADMIN/RCVD: CPT | Performed by: FAMILY MEDICINE

## 2019-07-02 PROCEDURE — 1036F TOBACCO NON-USER: CPT | Performed by: FAMILY MEDICINE

## 2019-07-02 RX ORDER — CEFDINIR 300 MG/1
300 CAPSULE ORAL 2 TIMES DAILY
Qty: 20 CAPSULE | Refills: 0 | Status: SHIPPED | OUTPATIENT
Start: 2019-07-02 | End: 2019-07-12

## 2019-07-02 RX ORDER — CIPROFLOXACIN AND DEXAMETHASONE 3; 1 MG/ML; MG/ML
4 SUSPENSION/ DROPS AURICULAR (OTIC) 2 TIMES DAILY
Qty: 1 BOTTLE | Refills: 1 | Status: SHIPPED | OUTPATIENT
Start: 2019-07-02 | End: 2019-07-09

## 2019-07-17 ENCOUNTER — OFFICE VISIT (OUTPATIENT)
Dept: FAMILY MEDICINE CLINIC | Age: 64
End: 2019-07-17
Payer: MEDICARE

## 2019-07-17 ENCOUNTER — TELEPHONE (OUTPATIENT)
Dept: FAMILY MEDICINE CLINIC | Age: 64
End: 2019-07-17

## 2019-07-17 VITALS
TEMPERATURE: 98 F | WEIGHT: 137.6 LBS | SYSTOLIC BLOOD PRESSURE: 132 MMHG | BODY MASS INDEX: 25.32 KG/M2 | HEART RATE: 68 BPM | HEIGHT: 62 IN | RESPIRATION RATE: 16 BRPM | DIASTOLIC BLOOD PRESSURE: 74 MMHG

## 2019-07-17 DIAGNOSIS — Z00.00 ROUTINE GENERAL MEDICAL EXAMINATION AT A HEALTH CARE FACILITY: ICD-10-CM

## 2019-07-17 DIAGNOSIS — M15.9 PRIMARY OSTEOARTHRITIS INVOLVING MULTIPLE JOINTS: ICD-10-CM

## 2019-07-17 DIAGNOSIS — E78.00 PURE HYPERCHOLESTEROLEMIA: ICD-10-CM

## 2019-07-17 DIAGNOSIS — I34.1 MVP (MITRAL VALVE PROLAPSE): ICD-10-CM

## 2019-07-17 DIAGNOSIS — K21.9 GASTROESOPHAGEAL REFLUX DISEASE WITHOUT ESOPHAGITIS: ICD-10-CM

## 2019-07-17 DIAGNOSIS — J30.2 SEASONAL ALLERGIES: ICD-10-CM

## 2019-07-17 DIAGNOSIS — I10 ESSENTIAL HYPERTENSION: Primary | ICD-10-CM

## 2019-07-17 DIAGNOSIS — M85.80 OSTEOPENIA DETERMINED BY X-RAY: ICD-10-CM

## 2019-07-17 DIAGNOSIS — K59.01 SLOW TRANSIT CONSTIPATION: ICD-10-CM

## 2019-07-17 PROCEDURE — 4040F PNEUMOC VAC/ADMIN/RCVD: CPT | Performed by: FAMILY MEDICINE

## 2019-07-17 PROCEDURE — 3017F COLORECTAL CA SCREEN DOC REV: CPT | Performed by: FAMILY MEDICINE

## 2019-07-17 PROCEDURE — G0009 ADMIN PNEUMOCOCCAL VACCINE: HCPCS | Performed by: FAMILY MEDICINE

## 2019-07-17 PROCEDURE — 90670 PCV13 VACCINE IM: CPT | Performed by: FAMILY MEDICINE

## 2019-07-17 PROCEDURE — G8399 PT W/DXA RESULTS DOCUMENT: HCPCS | Performed by: FAMILY MEDICINE

## 2019-07-17 PROCEDURE — 99214 OFFICE O/P EST MOD 30 MIN: CPT | Performed by: FAMILY MEDICINE

## 2019-07-17 PROCEDURE — 1036F TOBACCO NON-USER: CPT | Performed by: FAMILY MEDICINE

## 2019-07-17 PROCEDURE — G8427 DOCREV CUR MEDS BY ELIG CLIN: HCPCS | Performed by: FAMILY MEDICINE

## 2019-07-17 PROCEDURE — G0438 PPPS, INITIAL VISIT: HCPCS | Performed by: FAMILY MEDICINE

## 2019-07-17 PROCEDURE — 1123F ACP DISCUSS/DSCN MKR DOCD: CPT | Performed by: FAMILY MEDICINE

## 2019-07-17 PROCEDURE — G8419 CALC BMI OUT NRM PARAM NOF/U: HCPCS | Performed by: FAMILY MEDICINE

## 2019-07-17 PROCEDURE — 1090F PRES/ABSN URINE INCON ASSESS: CPT | Performed by: FAMILY MEDICINE

## 2019-07-17 RX ORDER — OMEPRAZOLE 40 MG/1
40 CAPSULE, DELAYED RELEASE ORAL DAILY
Qty: 90 CAPSULE | Refills: 11 | Status: SHIPPED | OUTPATIENT
Start: 2019-07-17 | End: 2020-06-18 | Stop reason: SDUPTHER

## 2019-07-17 ASSESSMENT — PATIENT HEALTH QUESTIONNAIRE - PHQ9
SUM OF ALL RESPONSES TO PHQ QUESTIONS 1-9: 0
SUM OF ALL RESPONSES TO PHQ QUESTIONS 1-9: 0

## 2019-07-17 NOTE — PROGRESS NOTES
Immunizations Administered     Name Date Dose Route    Pneumococcal Conjugate 13-valent ([id number]) 7/17/2019 0.5 mL Intramuscular    Site: Deltoid- Right    Lot: [id number]    NDC: 0020-3665-42          VIS GIVEN. CONSENT SIGNED  PATIENT TOLERATED WELL.

## 2019-07-17 NOTE — PATIENT INSTRUCTIONS
Advance Directives: Care Instructions  Your Care Instructions  An advance directive is a legal way to state your wishes at the end of your life. It tells your family and your doctor what to do if you can no longer say what you want. There are two main types of advance directives. You can change them any time that your wishes change. · A living will tells your family and your doctor your wishes about life support and other treatment. · A durable power of  for health care lets you name a person to make treatment decisions for you when you can't speak for yourself. This person is called a health care agent. If you do not have an advance directive, decisions about your medical care may be made by a doctor or a  who doesn't know you. It may help to think of an advance directive as a gift to the people who care for you. If you have one, they won't have to make tough decisions by themselves. Follow-up care is a key part of your treatment and safety. Be sure to make and go to all appointments, and call your doctor if you are having problems. It's also a good idea to know your test results and keep a list of the medicines you take. How can you care for yourself at home? · Discuss your wishes with your loved ones and your doctor. This way, there are no surprises. · Many states have a unique form. Or you might use a universal form that has been approved by many states. This kind of form can sometimes be completed and stored online. Your electronic copy will then be available wherever you have a connection to the Internet. In most cases, doctors will respect your wishes even if you have a form from a different state. · You don't need a  to do an advance directive. But you may want to get legal advice. · Think about these questions when you prepare an advance directive:  ? Who do you want to make decisions about your medical care if you are not able to?  Many people choose a family member or close friend. ? Do you know enough about life support methods that might be used? If not, talk to your doctor so you understand. ? What are you most afraid of that might happen? You might be afraid of having pain, losing your independence, or being kept alive by machines. ? Where would you prefer to die? Choices include your home, a hospital, or a nursing home. ? Would you like to have information about hospice care to support you and your family? ? Do you want to donate organs when you die? ? Do you want certain [employer] practices performed before you die? If so, put your wishes in the advance directive. · Read your advance directive every year, and make changes as needed. When should you call for help? Be sure to contact your doctor if you have any questions. Where can you learn more? Go to https://chginaeb.[email or web address]. org and sign in to your [employer] account. Enter R264 in the [employer] box to learn more about \"Advance Directives: Care Instructions. \"     If you do not have an account, please click on the \"Sign Up Now\" link. Current as of: April 18, 2018  Content Version: 12.0  © 4503-8510 Healthwise, [employer]. Care instructions adapted under license by [facility] ([facility]). If you have questions about a medical condition or this instruction, always ask your healthcare professional. [address] any warranty or liability for your use of this information. Learning About Durable Power of  for Health Care  What is a durable power of  for health care? A durable power of  for health care is one type of the legal forms called advance directives. It lets you decide who you want to make treatment decisions for you if you cannot speak or decide for yourself. The person you choose is called your health care agent. Another type of advance directive is a living will.  It lets you write down what kinds of treatment or life support you require you to get the form notarized. This means that a person called a  watches you sign the form and then he or she signs the form. Some states also require that two or more witnesses sign the form. Be sure to tell your family members and doctors who your health care agent is. Keep your forms in a safe place. But make sure that your loved ones know where the forms are. This could be in your desk where you keep other important papers. Make sure your doctor has a copy of your forms. Where can you learn more? Go to https://chpepiceweb.[email or web address]. org and sign in to your [employer] account. Enter [id number] in the [employer] box to learn more about \"Learning About Durable Power of  for Health Care. \"     If you do not have an account, please click on the \"Sign Up Now\" link. Current as of: April 18, 2018  Content Version: 12.0  © 9937-2828 [employer]. Care instructions adapted under license by [facility] ([facility]). If you have questions about a medical condition or this instruction, always ask your healthcare professional. [place] [address] any warranty or liability for your use of this information. Learning About Living [employer]  What is a living will? A living will is a legal form you use to write down the kind of care you want at the end of your life. It is used by the health professionals who will treat you if you aren't able to decide for yourself. If you put your wishes in writing, your loved ones and others will know what kind of care you want. They won't need to guess. This can ease your mind and be helpful to others. A living will is not the same as an estate or property will. An estate will explains what you want to happen with your money and property after you die. Is a living will a legal document? A living will is a legal document. Each state has its own laws about living wood.  If you move to another state, make sure that your living

## 2019-07-17 NOTE — PROGRESS NOTES
Elpidio Fisher MD   polyethylene glycol (GLYCOLAX) powder Take 17 g by mouth daily Yes Historical Provider, MD   vitamin C (ASCORBIC ACID) 500 MG tablet Take 500 mg by mouth three times a week  Yes Historical Provider, MD   omeprazole (PRILOSEC) 40 MG delayed release capsule Take 1 capsule by mouth daily Yes Elpidio Fisher MD   sucralfate (CARAFATE) 1 GM tablet Take 1 tablet by mouth 2 times daily  Patient taking differently: Take 1 g by mouth 2 times daily as needed  Yes Elpidio Fisher MD   cetirizine (ZYRTEC) 5 MG tablet Take 5 mg by mouth daily Yes Historical Provider, MD   magnesium 30 MG tablet Take 30 mg by mouth three times a week Yes Historical Provider, MD   Coenzyme Q10 (CO Q 10) 100 MG CAPS Take by mouth Yes Elpidio Fisher MD   Acetaminophen (TYLENOL EXTRA STRENGTH PO) Take by mouth 2 times daily as needed  Yes Historical Provider, MD   famotidine (PEPCID AC MAXIMUM STRENGTH) 20 MG tablet Take 20 mg by mouth daily  Yes Historical Provider, MD   therapeutic multivitamin-minerals (THERAGRAN-M) tablet Take 1 tablet by mouth daily. Yes Historical Provider, MD   CLIMARA 0.025 MG/24HR Place 1 patch onto the skin once a week.  Yes Historical Provider, MD   denosumab (PROLIA) 60 MG/ML SOLN SC injection Inject 1 mL into the skin once for 1 dose  Elpidio Fisher MD      Diagnosis Date    Allergic rhinitis     Bulging discs 2013    OIO    Chickenpox     Essential hypertension 9/13/2017    Gastroesophageal reflux disease without esophagitis 5/23/2016    History of kidney stones     Hyperlipidemia     MVP (mitral valve prolapse)     Osteoarthritis     Pneumonia 1992    Recurrent UTI      Past Surgical History:   Procedure Laterality Date    APPENDECTOMY  1996    BREAST LUMPECTOMY  1/1988    left - benign    CARDIOVASCULAR STRESS TEST  08/2018    COLONOSCOPY  08/2018    Polyps removed    DILATION AND CURETTAGE OF UTERUS      2-3 times    HERNIA REPAIR  2005    left inguinal   [address]  total for endometriosis    LAPAROSCOPY      open and scopes several    OTHER SURGICAL HISTORY      Titinum plates in back of c spine    PATELLAR TENDON REPAIR  1971    WISDOM TOOTH EXTRACTION         Family History   Problem Relation Age of Onset    Heart Disease Mother 61    Thyroid Disease Mother     Heart Disease Father 61    Kidney Disease Father     Cancer Maternal Grandfather 80        skin cancer    Glaucoma Maternal Grandfather     Stroke Paternal Grandmother 68   [facility] Rheum Arthritis Paternal Grandmother     Heart Disease Maternal Grandmother     Other Maternal Grandmother         Stomach Issues    Alzheimer's Disease Paternal Grandfather     Diabetes Sister         x2    High Blood Pressure Brother     High Cholesterol Brother     Diabetes Brother     Alzheimer's Disease Paternal Uncle        CareTeam (Including outside providers/suppliers regularly involved in providing care):   Patient Care Team:  El Escobedo MD as PCP - General (Family Medicine)  El Escobedo MD as PCP - [facility] Empaneled Provider    Wt Readings from Last 3 Encounters:   07/17/19 137 lb 9.6 oz (62.4 kg)   07/02/19 137 lb (62.1 kg)   03/07/19 135 lb (61.2 kg)     Vitals:    07/17/19 1512   BP: 132/74   Site: Left Upper Arm   Position: Sitting   Cuff Size: Medium Adult   Pulse: 68   Resp: 16   Temp: 98 °F (36.7 °C)   TempSrc: Temporal   Weight: 137 lb 9.6 oz (62.4 kg)   Height: 5' 2.01\" (1.575 m)     Body mass index is 25.16 kg/m². Based upon direct observation of the patient, evaluation of cognition reveals recent and remote memory intact. See above    Patient's complete Health Risk Assessment and screening values have been reviewed and are found in Flowsheets. The following problems were reviewed today and where indicated follow up appointments were made and/or referrals ordered. Positive Risk Factor Screenings with Interventions:     No Positive Risk Factors identified today.     Personalized Preventive Plan Current Health Maintenance Status  Immunization History   Administered Date(s) Administered    Influenza, Quadv, 3 Years and older, IM (Fluzone 3 yrs and older or Afluria 5 yrs and older) 09/21/2016    Influenza, Tanna Jester, 3 yrs and older, IM, PF (Fluzone 3 yrs and older or Afluria 5 yrs and older) 09/13/2017    Pneumococcal Conjugate 13-valent (Houma Valdovinos) 07/17/2019    Pneumococcal Polysaccharide ([id number]) 09/21/2016    Varicella (Varivax) 04/27/2015        Health Maintenance   Topic Date Due    DTaP/Tdap/Td vaccine (1 - Tdap) 06/03/1972    Shingles Vaccine (1 of 2) 06/22/2015    Flu vaccine (1) 09/01/2019    Breast cancer screen  03/07/2020    Potassium monitoring  05/11/2020    Creatinine monitoring  05/11/2020    Annual Wellness Visit (AWV)  07/16/2020    Pneumococcal 65+ years Vaccine (2 of 2 - PPSV23) 09/21/2021    Lipid screen  05/11/2024    Colon cancer screen colonoscopy  08/23/2028    DEXA (modify frequency per FRAX score)  Completed    Hepatitis C screen  Completed     Recommendations for Preventive Services Due: see orders and patient instructions/AVS.  .   Recommended screening schedule for the next 5-10 years is provided to the patient in written form: see Patient Instructions/AVS.

## 2019-07-18 ENCOUNTER — TELEPHONE (OUTPATIENT)
Dept: FAMILY MEDICINE CLINIC | Age: 64
End: 2019-07-18

## 2019-07-18 ASSESSMENT — LIFESTYLE VARIABLES
HOW OFTEN DURING THE LAST YEAR HAVE YOU FAILED TO DO WHAT WAS NORMALLY EXPECTED FROM YOU BECAUSE OF DRINKING: 0
HOW OFTEN DURING THE LAST YEAR HAVE YOU NEEDED AN ALCOHOLIC DRINK FIRST THING IN THE MORNING TO GET YOURSELF GOING AFTER A NIGHT OF HEAVY DRINKING: 0
HOW OFTEN DURING THE LAST YEAR HAVE YOU BEEN UNABLE TO REMEMBER WHAT HAPPENED THE NIGHT BEFORE BECAUSE YOU HAD BEEN DRINKING: 0
AUDIT-C TOTAL SCORE: 3
HAVE YOU OR SOMEONE ELSE BEEN INJURED AS A RESULT OF YOUR DRINKING: 0
AUDIT TOTAL SCORE: 3
HOW OFTEN DURING THE LAST YEAR HAVE YOU FOUND THAT YOU WERE NOT ABLE TO STOP DRINKING ONCE YOU HAD STARTED: 0
HOW MANY STANDARD DRINKS CONTAINING ALCOHOL DO YOU HAVE ON A TYPICAL DAY: 0
HOW OFTEN DO YOU HAVE SIX OR MORE DRINKS ON ONE OCCASION: 0
HAS A RELATIVE, FRIEND, DOCTOR, OR ANOTHER HEALTH PROFESSIONAL EXPRESSED CONCERN ABOUT YOUR DRINKING OR SUGGESTED YOU CUT DOWN: 0
HOW OFTEN DURING THE LAST YEAR HAVE YOU HAD A FEELING OF GUILT OR REMORSE AFTER DRINKING: 0
HOW OFTEN DO YOU HAVE A DRINK CONTAINING ALCOHOL: 3

## 2019-07-30 DIAGNOSIS — K21.9 GASTROESOPHAGEAL REFLUX DISEASE WITHOUT ESOPHAGITIS: ICD-10-CM

## 2019-07-30 DIAGNOSIS — E78.00 PURE HYPERCHOLESTEROLEMIA: ICD-10-CM

## 2019-08-13 ENCOUNTER — TELEPHONE (OUTPATIENT)
Dept: FAMILY MEDICINE CLINIC | Age: 64
End: 2019-08-13

## 2019-08-20 ENCOUNTER — TELEPHONE (OUTPATIENT)
Dept: FAMILY MEDICINE CLINIC | Age: 64
End: 2019-08-20

## 2019-08-26 RX ORDER — EZETIMIBE 10 MG/1
10 TABLET ORAL DAILY
Qty: 90 TABLET | Refills: 1 | Status: SHIPPED | OUTPATIENT
Start: 2019-08-26 | End: 2019-11-06 | Stop reason: ALTCHOICE

## 2019-08-26 NOTE — TELEPHONE ENCOUNTER
try zetia with it as well  It may help to keep the dose of the livalo as low as possible  Call patient

## 2019-09-12 ENCOUNTER — TELEPHONE (OUTPATIENT)
Dept: FAMILY MEDICINE CLINIC | Age: 64
End: 2019-09-12

## 2019-09-17 ENCOUNTER — TELEPHONE (OUTPATIENT)
Dept: FAMILY MEDICINE CLINIC | Age: 64
End: 2019-09-17

## 2019-09-17 NOTE — TELEPHONE ENCOUNTER
Redetermination Notice Denial for Julisa Odonnell has been received - Denied because coronary arterycalcium score was not done and pt does not have history cardiac disease such as a heart attack

## 2019-09-30 DIAGNOSIS — K21.9 GASTROESOPHAGEAL REFLUX DISEASE WITHOUT ESOPHAGITIS: ICD-10-CM

## 2019-09-30 RX ORDER — SUCRALFATE 1 G/1
1 TABLET ORAL 2 TIMES DAILY
Qty: 120 TABLET | Refills: 11 | Status: SHIPPED | OUTPATIENT
Start: 2019-09-30 | End: 2020-07-30 | Stop reason: ALTCHOICE

## 2019-11-06 ENCOUNTER — TELEPHONE (OUTPATIENT)
Dept: FAMILY MEDICINE CLINIC | Age: 64
End: 2019-11-06

## 2019-11-06 ENCOUNTER — HOSPITAL ENCOUNTER (EMERGENCY)
Age: 64
Discharge: HOME | End: 2019-11-06
Attending: FAMILY MEDICINE
Payer: MEDICARE

## 2019-11-06 VITALS
SYSTOLIC BLOOD PRESSURE: 143 MMHG | BODY MASS INDEX: 25.23 KG/M2 | HEART RATE: 74 BPM | WEIGHT: 138 LBS | DIASTOLIC BLOOD PRESSURE: 67 MMHG | TEMPERATURE: 97.4 F | OXYGEN SATURATION: 98 % | RESPIRATION RATE: 18 BRPM

## 2019-11-06 DIAGNOSIS — R51.9 NONINTRACTABLE EPISODIC HEADACHE, UNSPECIFIED HEADACHE TYPE: Primary | ICD-10-CM

## 2019-11-06 LAB
EKG ATRIAL RATE: 64 BPM
EKG P AXIS: 75 DEGREES
EKG P-R INTERVAL: 170 MS
EKG Q-T INTERVAL: 370 MS
EKG QRS DURATION: 94 MS
EKG QTC CALCULATION (BAZETT): 381 MS
EKG R AXIS: 66 DEGREES
EKG T AXIS: 57 DEGREES
EKG VENTRICULAR RATE: 64 BPM

## 2019-11-06 PROCEDURE — 93005 ELECTROCARDIOGRAM TRACING: CPT | Performed by: FAMILY MEDICINE

## 2019-11-06 PROCEDURE — 99284 EMERGENCY DEPT VISIT MOD MDM: CPT

## 2019-11-06 RX ORDER — MONTELUKAST SODIUM 10 MG/1
10 TABLET ORAL NIGHTLY
COMMUNITY
End: 2019-12-18

## 2019-11-06 ASSESSMENT — ENCOUNTER SYMPTOMS
SINUS PAIN: 0
SINUS PRESSURE: 0
COUGH: 0
SHORTNESS OF BREATH: 0
NAUSEA: 0
VOMITING: 0

## 2019-11-06 ASSESSMENT — PAIN DESCRIPTION - DESCRIPTORS: DESCRIPTORS: ACHING

## 2019-11-06 ASSESSMENT — PAIN DESCRIPTION - LOCATION: LOCATION: HEAD

## 2019-11-06 ASSESSMENT — PAIN DESCRIPTION - PAIN TYPE: TYPE: ACUTE PAIN

## 2019-11-06 ASSESSMENT — PAIN SCALES - GENERAL: PAINLEVEL_OUTOF10: 3

## 2019-11-06 ASSESSMENT — PAIN DESCRIPTION - FREQUENCY: FREQUENCY: INTERMITTENT

## 2019-11-07 ENCOUNTER — TELEPHONE (OUTPATIENT)
Dept: FAMILY MEDICINE CLINIC | Age: 64
End: 2019-11-07

## 2019-11-07 PROCEDURE — 93010 ELECTROCARDIOGRAM REPORT: CPT | Performed by: NUCLEAR MEDICINE

## 2019-11-08 ENCOUNTER — HOSPITAL ENCOUNTER (OUTPATIENT)
Age: 64
Discharge: HOME | End: 2019-11-08
Payer: MEDICARE

## 2019-11-08 ENCOUNTER — OFFICE VISIT (OUTPATIENT)
Dept: FAMILY MEDICINE CLINIC | Age: 64
End: 2019-11-08
Payer: MEDICARE

## 2019-11-08 ENCOUNTER — HOSPITAL ENCOUNTER (OUTPATIENT)
Dept: GENERAL RADIOLOGY | Age: 64
Discharge: HOME | End: 2019-11-08
Payer: MEDICARE

## 2019-11-08 ENCOUNTER — NURSE ONLY (OUTPATIENT)
Dept: LAB | Age: 64
End: 2019-11-08

## 2019-11-08 VITALS
HEIGHT: 62 IN | WEIGHT: 140.2 LBS | SYSTOLIC BLOOD PRESSURE: 114 MMHG | RESPIRATION RATE: 16 BRPM | DIASTOLIC BLOOD PRESSURE: 68 MMHG | HEART RATE: 74 BPM | OXYGEN SATURATION: 98 % | TEMPERATURE: 97.4 F | BODY MASS INDEX: 25.8 KG/M2

## 2019-11-08 DIAGNOSIS — R07.9 CHEST PAIN, UNSPECIFIED TYPE: ICD-10-CM

## 2019-11-08 DIAGNOSIS — R00.2 INTERMITTENT PALPITATIONS: ICD-10-CM

## 2019-11-08 DIAGNOSIS — R07.9 CHEST PAIN, UNSPECIFIED TYPE: Primary | ICD-10-CM

## 2019-11-08 DIAGNOSIS — Z23 NEED FOR INFLUENZA VACCINATION: ICD-10-CM

## 2019-11-08 LAB
ALBUMIN SERPL-MCNC: 4.5 G/DL (ref 3.5–5.1)
ALP BLD-CCNC: 57 U/L (ref 38–126)
ALT SERPL-CCNC: 28 U/L (ref 11–66)
ANION GAP SERPL CALCULATED.3IONS-SCNC: 13 MEQ/L (ref 8–16)
AST SERPL-CCNC: 25 U/L (ref 5–40)
BILIRUB SERPL-MCNC: 0.4 MG/DL (ref 0.3–1.2)
BUN BLDV-MCNC: 16 MG/DL (ref 7–22)
CALCIUM SERPL-MCNC: 9.9 MG/DL (ref 8.5–10.5)
CHLORIDE BLD-SCNC: 102 MEQ/L (ref 98–111)
CO2: 27 MEQ/L (ref 23–33)
CREAT SERPL-MCNC: 0.6 MG/DL (ref 0.4–1.2)
ERYTHROCYTE [DISTWIDTH] IN BLOOD BY AUTOMATED COUNT: 12.2 % (ref 11.5–14.5)
ERYTHROCYTE [DISTWIDTH] IN BLOOD BY AUTOMATED COUNT: 42.2 FL (ref 35–45)
GFR SERPL CREATININE-BSD FRML MDRD: > 90 ML/MIN/1.73M2
GLUCOSE BLD-MCNC: 80 MG/DL (ref 70–108)
HCT VFR BLD CALC: 45 % (ref 37–47)
HEMOGLOBIN: 15 GM/DL (ref 12–16)
MCH RBC QN AUTO: 31.4 PG (ref 26–33)
MCHC RBC AUTO-ENTMCNC: 33.3 GM/DL (ref 32.2–35.5)
MCV RBC AUTO: 94.1 FL (ref 81–99)
PLATELET # BLD: 262 THOU/MM3 (ref 130–400)
PMV BLD AUTO: 8.9 FL (ref 9.4–12.4)
POTASSIUM SERPL-SCNC: 4.3 MEQ/L (ref 3.5–5.2)
PRO-BNP: 50.9 PG/ML (ref 0–900)
RBC # BLD: 4.78 MILL/MM3 (ref 4.2–5.4)
SODIUM BLD-SCNC: 142 MEQ/L (ref 135–145)
TOTAL PROTEIN: 7.6 G/DL (ref 6.1–8)
TROPONIN T: < 0.01 NG/ML
WBC # BLD: 5.6 THOU/MM3 (ref 4.8–10.8)

## 2019-11-08 PROCEDURE — G8427 DOCREV CUR MEDS BY ELIG CLIN: HCPCS | Performed by: FAMILY MEDICINE

## 2019-11-08 PROCEDURE — 1036F TOBACCO NON-USER: CPT | Performed by: FAMILY MEDICINE

## 2019-11-08 PROCEDURE — G8399 PT W/DXA RESULTS DOCUMENT: HCPCS | Performed by: FAMILY MEDICINE

## 2019-11-08 PROCEDURE — 4040F PNEUMOC VAC/ADMIN/RCVD: CPT | Performed by: FAMILY MEDICINE

## 2019-11-08 PROCEDURE — 90653 IIV ADJUVANT VACCINE IM: CPT | Performed by: FAMILY MEDICINE

## 2019-11-08 PROCEDURE — G8482 FLU IMMUNIZE ORDER/ADMIN: HCPCS | Performed by: FAMILY MEDICINE

## 2019-11-08 PROCEDURE — 99213 OFFICE O/P EST LOW 20 MIN: CPT | Performed by: FAMILY MEDICINE

## 2019-11-08 PROCEDURE — 3017F COLORECTAL CA SCREEN DOC REV: CPT | Performed by: FAMILY MEDICINE

## 2019-11-08 PROCEDURE — G8417 CALC BMI ABV UP PARAM F/U: HCPCS | Performed by: FAMILY MEDICINE

## 2019-11-08 PROCEDURE — 1090F PRES/ABSN URINE INCON ASSESS: CPT | Performed by: FAMILY MEDICINE

## 2019-11-08 PROCEDURE — G0008 ADMIN INFLUENZA VIRUS VAC: HCPCS | Performed by: FAMILY MEDICINE

## 2019-11-08 PROCEDURE — 71046 X-RAY EXAM CHEST 2 VIEWS: CPT

## 2019-11-08 PROCEDURE — 1123F ACP DISCUSS/DSCN MKR DOCD: CPT | Performed by: FAMILY MEDICINE

## 2019-11-08 RX ORDER — CALCIUM CARBONATE 500(1250)
500 TABLET ORAL DAILY
COMMUNITY

## 2019-11-11 ENCOUNTER — TELEPHONE (OUTPATIENT)
Dept: FAMILY MEDICINE CLINIC | Age: 64
End: 2019-11-11

## 2019-11-11 ENCOUNTER — HOSPITAL ENCOUNTER (OUTPATIENT)
Dept: MAMMOGRAPHY | Age: 64
Discharge: HOME | End: 2019-11-11
Payer: MEDICARE

## 2019-11-11 DIAGNOSIS — Z12.39 SCREENING BREAST EXAMINATION: ICD-10-CM

## 2019-11-11 DIAGNOSIS — E78.00 PURE HYPERCHOLESTEROLEMIA: Primary | ICD-10-CM

## 2019-11-11 PROCEDURE — 77063 BREAST TOMOSYNTHESIS BI: CPT

## 2019-11-12 ENCOUNTER — HOSPITAL ENCOUNTER (OUTPATIENT)
Dept: NON INVASIVE DIAGNOSTICS | Age: 64
Discharge: HOME | End: 2019-11-12
Payer: MEDICARE

## 2019-11-12 DIAGNOSIS — R07.9 CHEST PAIN, UNSPECIFIED TYPE: ICD-10-CM

## 2019-11-12 DIAGNOSIS — R00.2 INTERMITTENT PALPITATIONS: ICD-10-CM

## 2019-11-12 PROCEDURE — 93270 REMOTE 30 DAY ECG REV/REPORT: CPT

## 2019-11-18 ENCOUNTER — NURSE ONLY (OUTPATIENT)
Dept: LAB | Age: 64
End: 2019-11-18

## 2019-11-18 DIAGNOSIS — E78.00 PURE HYPERCHOLESTEROLEMIA: ICD-10-CM

## 2019-11-18 LAB
ALBUMIN SERPL-MCNC: 4.2 G/DL (ref 3.5–5.1)
ALP BLD-CCNC: 51 U/L (ref 38–126)
ALT SERPL-CCNC: 20 U/L (ref 11–66)
AST SERPL-CCNC: 17 U/L (ref 5–40)
BILIRUB SERPL-MCNC: 0.4 MG/DL (ref 0.3–1.2)
BILIRUBIN DIRECT: < 0.2 MG/DL (ref 0–0.3)
CHOLESTEROL, TOTAL: 158 MG/DL (ref 100–199)
HDLC SERPL-MCNC: 62 MG/DL
LDL CHOLESTEROL CALCULATED: 80 MG/DL
TOTAL PROTEIN: 7.1 G/DL (ref 6.1–8)
TRIGL SERPL-MCNC: 80 MG/DL (ref 0–199)

## 2019-11-27 ENCOUNTER — TELEPHONE (OUTPATIENT)
Dept: CARDIOLOGY CLINIC | Age: 64
End: 2019-11-27

## 2019-11-27 ENCOUNTER — TELEPHONE (OUTPATIENT)
Dept: FAMILY MEDICINE CLINIC | Age: 64
End: 2019-11-27

## 2019-11-27 DIAGNOSIS — I48.0 PAROXYSMAL ATRIAL FIBRILLATION (HCC): Primary | ICD-10-CM

## 2019-11-27 RX ORDER — METOPROLOL SUCCINATE 25 MG/1
12.5 TABLET, EXTENDED RELEASE ORAL DAILY
Qty: 90 TABLET | Refills: 1 | Status: SHIPPED
Start: 2019-11-27 | End: 2020-02-19 | Stop reason: ALTCHOICE

## 2019-12-18 ENCOUNTER — OFFICE VISIT (OUTPATIENT)
Dept: CARDIOLOGY CLINIC | Age: 64
End: 2019-12-18
Payer: MEDICARE

## 2019-12-18 VITALS
HEIGHT: 62 IN | WEIGHT: 138 LBS | BODY MASS INDEX: 25.4 KG/M2 | SYSTOLIC BLOOD PRESSURE: 124 MMHG | DIASTOLIC BLOOD PRESSURE: 60 MMHG | HEART RATE: 60 BPM

## 2019-12-18 DIAGNOSIS — R42 DIZZINESS: ICD-10-CM

## 2019-12-18 DIAGNOSIS — I49.9 CARDIAC ARRHYTHMIA, UNSPECIFIED CARDIAC ARRHYTHMIA TYPE: Primary | ICD-10-CM

## 2019-12-18 DIAGNOSIS — I48.0 PAF (PAROXYSMAL ATRIAL FIBRILLATION) (HCC): ICD-10-CM

## 2019-12-18 PROCEDURE — G8428 CUR MEDS NOT DOCUMENT: HCPCS | Performed by: INTERNAL MEDICINE

## 2019-12-18 PROCEDURE — G8417 CALC BMI ABV UP PARAM F/U: HCPCS | Performed by: INTERNAL MEDICINE

## 2019-12-18 PROCEDURE — 3017F COLORECTAL CA SCREEN DOC REV: CPT | Performed by: INTERNAL MEDICINE

## 2019-12-18 PROCEDURE — G8482 FLU IMMUNIZE ORDER/ADMIN: HCPCS | Performed by: INTERNAL MEDICINE

## 2019-12-18 PROCEDURE — 4040F PNEUMOC VAC/ADMIN/RCVD: CPT | Performed by: INTERNAL MEDICINE

## 2019-12-18 PROCEDURE — G8399 PT W/DXA RESULTS DOCUMENT: HCPCS | Performed by: INTERNAL MEDICINE

## 2019-12-18 PROCEDURE — 99213 OFFICE O/P EST LOW 20 MIN: CPT | Performed by: INTERNAL MEDICINE

## 2019-12-18 PROCEDURE — 1036F TOBACCO NON-USER: CPT | Performed by: INTERNAL MEDICINE

## 2019-12-18 PROCEDURE — 1123F ACP DISCUSS/DSCN MKR DOCD: CPT | Performed by: INTERNAL MEDICINE

## 2019-12-18 PROCEDURE — 1090F PRES/ABSN URINE INCON ASSESS: CPT | Performed by: INTERNAL MEDICINE

## 2019-12-19 ENCOUNTER — HOSPITAL ENCOUNTER (OUTPATIENT)
Dept: NON INVASIVE DIAGNOSTICS | Age: 64
Discharge: HOME | End: 2019-12-19
Payer: MEDICARE

## 2019-12-19 DIAGNOSIS — I49.9 CARDIAC ARRHYTHMIA, UNSPECIFIED CARDIAC ARRHYTHMIA TYPE: ICD-10-CM

## 2019-12-19 DIAGNOSIS — R42 DIZZINESS: ICD-10-CM

## 2019-12-19 DIAGNOSIS — I48.0 PAF (PAROXYSMAL ATRIAL FIBRILLATION) (HCC): ICD-10-CM

## 2019-12-19 PROCEDURE — 0296T HC EXT ECG RECORDING 2-21 DAY HOOKUP: CPT

## 2019-12-24 ENCOUNTER — TELEPHONE (OUTPATIENT)
Dept: FAMILY MEDICINE CLINIC | Age: 64
End: 2019-12-24

## 2019-12-24 NOTE — TELEPHONE ENCOUNTER
Zak Pfeiffer just checked Radha's labs - Pt's LDL 6/11/2019 : 132 ; LDL 11/18/2019 : 80 - looks like something is working for pt (Mookie ?  Kori?) - she may no longer be a candidate for Repatha - let me know what you want me to do

## 2019-12-24 NOTE — TELEPHONE ENCOUNTER
Marilyn, I notice that pt is now seeing Dr Umer Call for new onset AFib - Do you want me to try again  to get Shayla eKlly approved for pt and schedule coronary calcium scan at [facility] in [facility], I saw on line that [facility] offers this test for $50.00 or is this something Dr Umer Call will take care of? Or does pt not need this coronary calcium scan now that she has Dx of Afib?

## 2020-01-21 ENCOUNTER — OFFICE VISIT (OUTPATIENT)
Dept: FAMILY MEDICINE CLINIC | Age: 65
End: 2020-01-21
Payer: MEDICARE

## 2020-01-21 VITALS
DIASTOLIC BLOOD PRESSURE: 70 MMHG | WEIGHT: 138 LBS | TEMPERATURE: 97.1 F | BODY MASS INDEX: 25.24 KG/M2 | RESPIRATION RATE: 14 BRPM | SYSTOLIC BLOOD PRESSURE: 122 MMHG | HEART RATE: 68 BPM

## 2020-01-21 PROCEDURE — 90471 IMMUNIZATION ADMIN: CPT | Performed by: FAMILY MEDICINE

## 2020-01-21 PROCEDURE — G8482 FLU IMMUNIZE ORDER/ADMIN: HCPCS | Performed by: FAMILY MEDICINE

## 2020-01-21 PROCEDURE — 99214 OFFICE O/P EST MOD 30 MIN: CPT | Performed by: FAMILY MEDICINE

## 2020-01-21 PROCEDURE — 1123F ACP DISCUSS/DSCN MKR DOCD: CPT | Performed by: FAMILY MEDICINE

## 2020-01-21 PROCEDURE — 90715 TDAP VACCINE 7 YRS/> IM: CPT | Performed by: FAMILY MEDICINE

## 2020-01-21 PROCEDURE — G8427 DOCREV CUR MEDS BY ELIG CLIN: HCPCS | Performed by: FAMILY MEDICINE

## 2020-01-21 PROCEDURE — 1090F PRES/ABSN URINE INCON ASSESS: CPT | Performed by: FAMILY MEDICINE

## 2020-01-21 PROCEDURE — 1036F TOBACCO NON-USER: CPT | Performed by: FAMILY MEDICINE

## 2020-01-21 PROCEDURE — 4040F PNEUMOC VAC/ADMIN/RCVD: CPT | Performed by: FAMILY MEDICINE

## 2020-01-21 PROCEDURE — G8399 PT W/DXA RESULTS DOCUMENT: HCPCS | Performed by: FAMILY MEDICINE

## 2020-01-21 PROCEDURE — 3017F COLORECTAL CA SCREEN DOC REV: CPT | Performed by: FAMILY MEDICINE

## 2020-01-21 PROCEDURE — G8417 CALC BMI ABV UP PARAM F/U: HCPCS | Performed by: FAMILY MEDICINE

## 2020-01-21 ASSESSMENT — PATIENT HEALTH QUESTIONNAIRE - PHQ9
SUM OF ALL RESPONSES TO PHQ QUESTIONS 1-9: 0
SUM OF ALL RESPONSES TO PHQ QUESTIONS 1-9: 0
2. FEELING DOWN, DEPRESSED OR HOPELESS: 0
SUM OF ALL RESPONSES TO PHQ9 QUESTIONS 1 & 2: 0
1. LITTLE INTEREST OR PLEASURE IN DOING THINGS: 0

## 2020-01-21 NOTE — PROGRESS NOTES
Immunizations Administered     Name Date Dose Route    Tdap (Boostrix, Adacel) 1/21/2020 0.5 mL Intramuscular    Site: Deltoid- Left    Lot: [id number]    NDC: 51185-406-36          VIS GIVEN. CONSENT SIGNED  PATIENT TOLERATED WELL.

## 2020-01-21 NOTE — PROGRESS NOTES
[address] [place] [address] Thomas Giang  Dept: [phone]  Dept Fax: [phone]  Loc: Susana Shay is a 75 y.o. female who presents today for:  Chief Complaint   Patient presents with    6 Month Follow-Up           HPI:     HPI    Hypertension is controlled on current medications. Afib under the management of cardiology. She reports the toprol was stopped but the cardiologist note reports that he was going to continue the beta blocker. Hyperlipidemia: Patient presents with hyperlipidemia. She was tested because family history. Her last labs showed   Lab Results   Component Value Date    CHOL 158 11/18/2019    CHOL 211 05/11/2019    CHOL 176 09/14/2017     Lab Results   Component Value Date    TRIG 80 11/18/2019    TRIG 101 05/11/2019    TRIG 80 09/14/2017     Lab Results   Component Value Date    HDL 62 11/18/2019    HDL 59 05/11/2019    HDL 65 09/14/2017     Lab Results   Component Value Date    LDLCALC 80 11/18/2019    LDLCALC 132 05/11/2019    [address] 09/14/2017     Lab Results   Component Value Date    VLDL 20 05/11/2019    VLDL 23 05/14/2016    VLDL 21 10/10/2015     Lab Results   Component Value Date    CHOLHDLRATIO 2.2 05/11/2019    CHOLHDLRATIO 4.0 10/05/2013     There is a family history of hyperlipidemia. There is a family history of early ischemia heart disease. Allergic Rhinitis: Judy Trevino is here for evaluation of possible allergic rhinitis. Patient's symptoms include nasal congestion, postnasal drip and sinus pressure. These symptoms are seasonal. Current triggers include exposure to pollens. The patient has been suffering from these symptoms for approximately 3 months. The patient has tried over the counter medications with fair relief of symptoms. Immunotherapy has never been tried. The patient has never had nasal polyps. The patient has no history of asthma.  The patient mass. There is no hepatosplenomegaly. No tenderness. She has no rigidity, no rebound and no guarding. No hernia. Musculoskeletal:        Right lower leg: She exhibits no edema. Left lower leg: She exhibits no edema. Neurological: She is alert. Assessment/Plan   Kizzy Davidson was seen today for 6 month follow-up. Diagnoses and all orders for this visit:    Essential hypertension    Pure hypercholesterolemia  -     Comprehensive Metabolic Panel; Future  -     Lipid Panel; Future    Paroxysmal atrial fibrillation (HCC)    Gastroesophageal reflux disease without esophagitis  -     CBC; Future    Seasonal allergies    Primary osteoarthritis involving multiple joints    MVP (mitral valve prolapse)    Osteopenia determined by x-ray    Routine general medical examination at a health care facility  -     CBC; Future  -     Comprehensive Metabolic Panel; Future  -     Lipid Panel; Future  -     TSH With Reflex Ft4; Future    Other orders  -     Tdap (age 6y and older) IM (BOOSTRIX)    No change to medications   Continue healthy diet and exercise  Aspirin daily    [place]well foods  Small meals  No late meals    Discussed use, benefit, and side effectsof prescribed medications. All patient questions answered. Pt voiced understanding. Reviewed health maintenance. Instructed to continue current medications, diet and exercise. Patient agreed with treatment plan. Followup as directed.      Electronically signed by Edith Valiente MD

## 2020-02-19 ENCOUNTER — OFFICE VISIT (OUTPATIENT)
Dept: CARDIOLOGY CLINIC | Age: 65
End: 2020-02-19
Payer: MEDICARE

## 2020-02-19 VITALS
HEIGHT: 62 IN | HEART RATE: 76 BPM | BODY MASS INDEX: 25.64 KG/M2 | DIASTOLIC BLOOD PRESSURE: 78 MMHG | SYSTOLIC BLOOD PRESSURE: 134 MMHG | WEIGHT: 139.3 LBS

## 2020-02-19 PROCEDURE — 1036F TOBACCO NON-USER: CPT | Performed by: INTERNAL MEDICINE

## 2020-02-19 PROCEDURE — G8428 CUR MEDS NOT DOCUMENT: HCPCS | Performed by: INTERNAL MEDICINE

## 2020-02-19 PROCEDURE — G8482 FLU IMMUNIZE ORDER/ADMIN: HCPCS | Performed by: INTERNAL MEDICINE

## 2020-02-19 PROCEDURE — 1123F ACP DISCUSS/DSCN MKR DOCD: CPT | Performed by: INTERNAL MEDICINE

## 2020-02-19 PROCEDURE — G8399 PT W/DXA RESULTS DOCUMENT: HCPCS | Performed by: INTERNAL MEDICINE

## 2020-02-19 PROCEDURE — G8417 CALC BMI ABV UP PARAM F/U: HCPCS | Performed by: INTERNAL MEDICINE

## 2020-02-19 PROCEDURE — 3017F COLORECTAL CA SCREEN DOC REV: CPT | Performed by: INTERNAL MEDICINE

## 2020-02-19 PROCEDURE — 99213 OFFICE O/P EST LOW 20 MIN: CPT | Performed by: INTERNAL MEDICINE

## 2020-02-19 PROCEDURE — 1090F PRES/ABSN URINE INCON ASSESS: CPT | Performed by: INTERNAL MEDICINE

## 2020-02-19 PROCEDURE — 4040F PNEUMOC VAC/ADMIN/RCVD: CPT | Performed by: INTERNAL MEDICINE

## 2020-02-19 NOTE — PROGRESS NOTES
[address] [place], [place] [address] [facility] ([place] [address]  Dept: [phone]  Dept Fax: [phone]  Loc: [phone]    Visit Date: 2/19/2020    Ms. Jose Reynoso is a 75 y.o. female  who presented for:  Chief Complaint   Patient presents with    Follow-up     zio        HPI:   76 yo F is here for new onset of Afib. Patient states she was told that she intermittent fast heart rate. She underwent 30 day event monitor which showed 5-6 beats of SVT. Otherwise no symptoms. Denies any chest pain, sob, palpitations, lightheadedness, dizziness, orthopnea, PND or pedal edema.          Current Outpatient Medications:     apixaban (ELIQUIS) 2.5 MG TABS tablet, Take 1 tablet by mouth 2 times daily, Disp: 60 tablet, Rfl: 5    CIPRODEX 0.3-0.1 % otic suspension, , Disp: , Rfl:     calcium carbonate (OSCAL) 500 MG TABS tablet, Take 500 mg by mouth daily, Disp: , Rfl:     sucralfate (CARAFATE) 1 GM tablet, Take 1 tablet by mouth 2 times daily (Patient taking differently: Take 1 g by mouth 2 times daily PRN), Disp: 120 tablet, Rfl: 11    omeprazole (PRILOSEC) 40 MG delayed release capsule, Take 1 capsule by mouth daily, Disp: 90 capsule, Rfl: 11    pitavastatin (LIVALO) 2 MG TABS tablet, Take 1 tablet by mouth nightly, Disp: 30 tablet, Rfl: 11    Omega-3 Fatty Acids (FISH OIL PO), Take by mouth Takes three times a week, Disp: , Rfl:     Psyllium (METAMUCIL FIBER PO), Take by mouth, Disp: , Rfl:     fluticasone (FLONASE) 50 MCG/ACT nasal spray, USE 2 SPRAYS IN EACH NOSTRIL ONCE DAILY, Disp: 48 g, Rfl: 5    vitamin C (ASCORBIC ACID) 500 MG tablet, Take 500 mg by mouth three times a week , Disp: , Rfl:     cetirizine (ZYRTEC) 5 MG tablet, Take 5 mg by mouth daily, Disp: , Rfl:     magnesium 30 MG tablet, Take 30 mg by mouth three times a week, Disp: , Rfl:     Coenzyme Q10 (CO Q 10) 100 MG CAPS, Take by mouth, Disp: 30 capsule, Rfl:     Acetaminophen (TYLENOL EXTRA dry.   Psychiatric: Normal mood and affect.        No results found for: CKTOTAL, CKMB, CKMBINDEX    Lab Results   Component Value Date    WBC 5.6 11/08/2019    RBC 4.78 11/08/2019    RBC 4.49 03/22/2012    HGB 15.0 11/08/2019    HCT 45.0 11/08/2019    MCV 94.1 11/08/2019    MCH 31.4 11/08/2019    MCHC 33.3 11/08/2019    RDW 13.2 05/11/2019     11/08/2019    MPV 8.9 11/08/2019       Lab Results   Component Value Date     11/08/2019    K 4.3 11/08/2019     11/08/2019    CO2 27 11/08/2019    BUN 16 11/08/2019    LABALBU 4.2 11/18/2019    LABALBU 4.3 03/22/2012    CREATININE 0.6 11/08/2019    CALCIUM 9.9 11/08/2019    LABGLOM >90 11/08/2019    GLUCOSE 80 11/08/2019    GLUCOSE 94 05/14/2016       Lab Results   Component Value Date    ALKPHOS 51 11/18/2019    ALT 20 11/18/2019    AST 17 11/18/2019    PROT 7.1 11/18/2019    BILITOT 0.4 11/18/2019    BILIDIR <0.2 11/18/2019    LABALBU 4.2 11/18/2019    LABALBU 4.3 03/22/2012       No results found for: MG    No results found for: INR, PROTIME      Lab Results   Component Value Date    LABA1C 5.4 05/11/2019       Lab Results   Component Value Date    TRIG 80 11/18/2019    HDL 62 11/18/2019    LDLCALC 80 11/18/2019       Lab Results   Component Value Date    TSH 1.460 05/11/2019         Testing Reviewed:      I haveindividually reviewed the below cardiac tests    EKG:    ECHO:   Results for orders placed during the hospital encounter of 08/28/18   Echocardiogram complete    Narrative Transthoracic Echocardiography Report (TTE)     Demographics      Patient Name    Gabi Rojo  Gender               Female                   D      MR #            [id number]       Race                                                       Ethnicity      Account #       [de-identified]       Room Number      Accession       [id number]       Date of Study        08/28/2018   Number      Date of Birth   1955      Referring Physician  Lillie Spencer MD      Age 70 year(s)      Chelsey Sheldon, [facility]                                      Interpreting         Jose Manuel Martinez MD                                   Physician     Procedure    Type of Study      TTE procedure:ECHOCARDIOGRAM COMPLETE 2D W DOPPLER W COLOR. Procedure Date  Date: 08/28/2018 Start: 09:48 AM    Study Location: Echo Lab  Technical Quality: Adequate visualization    Indications:Abnormal ECG and Mitral valve prolapse. Additional Medical History:Mitral valve prolapse, Osteoarthritis,  Hyperlipidemia, GERD, Hypertension, Family history of heart disease    Patient Status: Routine    Height: 61.81 inches Weight: 132 pounds BSA: 1.6 m^2 BMI: 24.29 kg/m^2    BP: 112/70 mmHg     Conclusions      Summary   Overall left ventricular function is normal.   Ejection fraction is visually estimated at 60%. Signature      ----------------------------------------------------------------   Electronically signed by Jose Manuel Martinez MD (Interpreting   physician) on 08/29/2018 at 10:25 AM   ----------------------------------------------------------------      Findings      Mitral Valve   The mitral valve structure was normal with normal leaflet separation. DOPPLER: The transmitral velocity was within the normal range with no   evidence for mitral stenosis. There was no evidence of mitral   regurgitation. Aortic Valve   The aortic valve was trileaflet with normal thickness and cuspal   separation. DOPPLER: Transaortic velocity was within the normal range with   no evidence of aortic stenosis. There was no evidence of aortic   regurgitation. Tricuspid Valve   Mild tricuspid regurgitation. Pulmonic Valve   The pulmonic valve was not well visualized . Trivial pulmonic regurgitation visualized. Left Atrium   Left atrial size was normal.      Left Ventricle   Overall left ventricular function is normal.   Ejection fraction is visually estimated at 60%.       Right Atrium   Right PV Peak Velocity: 71.3   cm/s                                             cm/s   MV E' Lateral Velocity:                          PV Peak Gradient: 2.03   9.5 cm/s                                         mmHg   MV A' Lateral Velocity:   12.2 cm/s   E/E' septal: 15.31                               HI ED Velocity: 115 cm/s   E/E' lateral: 10.32   MR Velocity: 441 cm/s     [email or web address] [id number]       STRESS:    CATH:    Assessment/Plan       Diagnosis Orders   1. Atrial fibrillation, unspecified type (HCC)         SVT vs ?new onset Afib     Reviewed Zio monitor  [id number]: 2  Currently on beta blocker and eliquis   Will continue for now  Discussed R/B/A  The patient is asked to make an attempt to improve diet and exercise patterns to aid in medical management of this problem. Advised more plant based nutrition/meditarrean diet   Advised patient to call office or seek immediate medical attention if there is any new onset of  any chest pain, sob, palpitations, lightheadedness, dizziness, orthopnea, PND or pedal edema. All medication side effects were discussed in details.     Thank youfor allowing me to participate in the care of this patient. Please do not hesitate to contact me for any further questions. Return in about 1 year (around 2/19/2021), or if symptoms worsen or fail to improve, for Review testing, Regular follow up.        Electronically signed by Venu Garza MD [facility]  2/19/2020 at 9:11 AM

## 2020-04-13 RX ORDER — PITAVASTATIN CALCIUM 2.09 MG/1
2 TABLET, FILM COATED ORAL NIGHTLY
Qty: 30 TABLET | Refills: 11 | Status: SHIPPED | OUTPATIENT
Start: 2020-04-13 | End: 2020-04-21 | Stop reason: SDUPTHER

## 2020-04-21 RX ORDER — PITAVASTATIN CALCIUM 2.09 MG/1
2 TABLET, FILM COATED ORAL NIGHTLY
Qty: 90 TABLET | Refills: 3 | Status: SHIPPED | OUTPATIENT
Start: 2020-04-21 | End: 2021-01-27 | Stop reason: SDUPTHER

## 2020-04-28 ENCOUNTER — HOSPITAL ENCOUNTER (EMERGENCY)
Age: 65
Discharge: HOME | End: 2020-04-28
Attending: EMERGENCY MEDICINE
Payer: OTHER MISCELLANEOUS

## 2020-04-28 ENCOUNTER — APPOINTMENT (OUTPATIENT)
Dept: CT IMAGING | Age: 65
End: 2020-04-28
Payer: OTHER MISCELLANEOUS

## 2020-04-28 ENCOUNTER — APPOINTMENT (OUTPATIENT)
Dept: GENERAL RADIOLOGY | Age: 65
End: 2020-04-28
Payer: OTHER MISCELLANEOUS

## 2020-04-28 VITALS
HEART RATE: 72 BPM | TEMPERATURE: 98 F | RESPIRATION RATE: 18 BRPM | OXYGEN SATURATION: 100 % | SYSTOLIC BLOOD PRESSURE: 153 MMHG | DIASTOLIC BLOOD PRESSURE: 67 MMHG

## 2020-04-28 LAB
ALBUMIN SERPL-MCNC: 3.8 GM/DL (ref 3.4–5)
ALP BLD-CCNC: 55 U/L (ref 46–116)
ALT SERPL-CCNC: 45 U/L (ref 14–63)
ANION GAP: 10 MEQ/L (ref 8–16)
AST SERPL-CCNC: 37 U/L (ref 15–37)
BASOPHILS # BLD: 0.5 % (ref 0–3)
BILIRUB SERPL-MCNC: 0.3 MG/DL (ref 0.2–1)
BUN BLDV-MCNC: 14 MG/DL (ref 7–18)
CHLORIDE BLD-SCNC: 103 MEQ/L (ref 98–107)
CO2: 29 MEQ/L (ref 21–32)
CREAT SERPL-MCNC: 0.8 MG/DL (ref 0.6–1.3)
EKG ATRIAL RATE: 74 BPM
EKG P AXIS: 72 DEGREES
EKG P-R INTERVAL: 164 MS
EKG Q-T INTERVAL: 370 MS
EKG QRS DURATION: 96 MS
EKG QTC CALCULATION (BAZETT): 410 MS
EKG R AXIS: 20 DEGREES
EKG T AXIS: 35 DEGREES
EKG VENTRICULAR RATE: 74 BPM
EOSINOPHILS RELATIVE PERCENT: 1.9 % (ref 0–4)
GFR, ESTIMATED: 76 ML/MIN/1.73M2
GLUCOSE BLD-MCNC: 94 MG/DL (ref 74–106)
HCT VFR BLD CALC: 43.7 % (ref 37–47)
HEMOGLOBIN: 14.4 GM/DL (ref 12–16)
LYMPHOCYTES # BLD: 30.9 % (ref 15–47)
MCH RBC QN AUTO: 31.4 PG (ref 27–31)
MCHC RBC AUTO-ENTMCNC: 32.9 GM/DL (ref 33–37)
MCV RBC AUTO: 95.4 FL (ref 81–99)
MONOCYTES: 7.9 % (ref 0–12)
PDW BLD-RTO: 13.5 % (ref 11.5–14.5)
PLATELET # BLD: 222 THOU/MM3 (ref 130–400)
PMV BLD AUTO: 6.8 FL (ref 7.4–10.4)
POC CALCIUM: 9.2 MG/DL (ref 8.5–10.1)
POTASSIUM SERPL-SCNC: 3.6 MEQ/L (ref 3.5–5.1)
RBC # BLD: 4.59 MILL/MM3 (ref 4.2–5.4)
SEGS: 58.8 % (ref 43–75)
SODIUM BLD-SCNC: 142 MEQ/L (ref 136–145)
TOTAL PROTEIN: 7.4 GM/DL (ref 6.4–8.2)
TROPONIN I: < 0.017 NG/ML (ref 0.02–0.05)
WBC # BLD: 6.2 THOU/MM3 (ref 4.8–10.8)

## 2020-04-28 PROCEDURE — 93005 ELECTROCARDIOGRAM TRACING: CPT | Performed by: EMERGENCY MEDICINE

## 2020-04-28 PROCEDURE — 84484 ASSAY OF TROPONIN QUANT: CPT

## 2020-04-28 PROCEDURE — 2709999900 HC NON-CHARGEABLE SUPPLY

## 2020-04-28 PROCEDURE — 71260 CT THORAX DX C+: CPT

## 2020-04-28 PROCEDURE — 36415 COLL VENOUS BLD VENIPUNCTURE: CPT

## 2020-04-28 PROCEDURE — 93010 ELECTROCARDIOGRAM REPORT: CPT | Performed by: INTERNAL MEDICINE

## 2020-04-28 PROCEDURE — 99285 EMERGENCY DEPT VISIT HI MDM: CPT

## 2020-04-28 PROCEDURE — 70450 CT HEAD/BRAIN W/O DYE: CPT

## 2020-04-28 PROCEDURE — 80053 COMPREHEN METABOLIC PANEL: CPT

## 2020-04-28 PROCEDURE — 72125 CT NECK SPINE W/O DYE: CPT

## 2020-04-28 PROCEDURE — 2580000003 HC RX 258: Performed by: EMERGENCY MEDICINE

## 2020-04-28 PROCEDURE — 74177 CT ABD & PELVIS W/CONTRAST: CPT

## 2020-04-28 PROCEDURE — 76376 3D RENDER W/INTRP POSTPROCES: CPT

## 2020-04-28 PROCEDURE — 6360000004 HC RX CONTRAST MEDICATION: Performed by: EMERGENCY MEDICINE

## 2020-04-28 PROCEDURE — 73564 X-RAY EXAM KNEE 4 OR MORE: CPT

## 2020-04-28 PROCEDURE — 85025 COMPLETE CBC W/AUTO DIFF WBC: CPT

## 2020-04-28 RX ORDER — 0.9 % SODIUM CHLORIDE 0.9 %
1000 INTRAVENOUS SOLUTION INTRAVENOUS ONCE
Status: COMPLETED | OUTPATIENT
Start: 2020-04-28 | End: 2020-04-28

## 2020-04-28 RX ADMIN — SODIUM CHLORIDE 1000 ML: 9 INJECTION, SOLUTION INTRAVENOUS at 18:42

## 2020-04-28 RX ADMIN — IOPAMIDOL 100 ML: 755 INJECTION, SOLUTION INTRAVENOUS at 18:31

## 2020-04-28 ASSESSMENT — ENCOUNTER SYMPTOMS
BACK PAIN: 1
ABDOMINAL PAIN: 0
BLOOD IN STOOL: 0
SORE THROAT: 0
SHORTNESS OF BREATH: 0
EYE DISCHARGE: 0
DIARRHEA: 0
EYE PAIN: 0
WHEEZING: 0

## 2020-04-28 ASSESSMENT — PAIN DESCRIPTION - LOCATION: LOCATION: STERNUM

## 2020-04-28 ASSESSMENT — PAIN SCALES - GENERAL
PAINLEVEL_OUTOF10: 5
PAINLEVEL_OUTOF10: 6
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
## 2020-04-29 ENCOUNTER — TELEPHONE (OUTPATIENT)
Dept: PRIMARY CARE CLINIC | Age: 65
End: 2020-04-29

## 2020-04-29 NOTE — ED NOTES
Discharged home in stable condition. Refillable ice pack given with instructions for use. AVS discussed/reviewed with patient. Patient verbalized understanding of all instructions given; no questions/concerns voiced. Respirations easy, regular and non-labored; no distress noted. Skin pink, warm and dry; mucous membranes moist. Alert and appropriate for age. Ambulated per self to private vehicle.      David Mandel RN  04/28/20 2014

## 2020-04-30 ENCOUNTER — TELEPHONE (OUTPATIENT)
Dept: FAMILY MEDICINE CLINIC | Age: 65
End: 2020-04-30

## 2020-04-30 ENCOUNTER — VIRTUAL VISIT (OUTPATIENT)
Dept: FAMILY MEDICINE CLINIC | Age: 65
End: 2020-04-30
Payer: MEDICARE

## 2020-04-30 VITALS
WEIGHT: 139 LBS | DIASTOLIC BLOOD PRESSURE: 76 MMHG | SYSTOLIC BLOOD PRESSURE: 131 MMHG | BODY MASS INDEX: 25.42 KG/M2 | HEART RATE: 80 BPM

## 2020-04-30 PROCEDURE — 1123F ACP DISCUSS/DSCN MKR DOCD: CPT | Performed by: FAMILY MEDICINE

## 2020-04-30 PROCEDURE — 99214 OFFICE O/P EST MOD 30 MIN: CPT | Performed by: FAMILY MEDICINE

## 2020-04-30 PROCEDURE — 3017F COLORECTAL CA SCREEN DOC REV: CPT | Performed by: FAMILY MEDICINE

## 2020-04-30 PROCEDURE — G8427 DOCREV CUR MEDS BY ELIG CLIN: HCPCS | Performed by: FAMILY MEDICINE

## 2020-04-30 PROCEDURE — 1090F PRES/ABSN URINE INCON ASSESS: CPT | Performed by: FAMILY MEDICINE

## 2020-04-30 PROCEDURE — G8399 PT W/DXA RESULTS DOCUMENT: HCPCS | Performed by: FAMILY MEDICINE

## 2020-04-30 PROCEDURE — 4040F PNEUMOC VAC/ADMIN/RCVD: CPT | Performed by: FAMILY MEDICINE

## 2020-04-30 RX ORDER — CYCLOBENZAPRINE HCL 5 MG
5-10 TABLET ORAL 2 TIMES DAILY PRN
Qty: 60 TABLET | Refills: 2 | Status: SHIPPED | OUTPATIENT
Start: 2020-04-30 | End: 2020-05-10

## 2020-04-30 RX ORDER — CEPHALEXIN 500 MG/1
500 CAPSULE ORAL 3 TIMES DAILY
Qty: 30 CAPSULE | Refills: 0 | Status: SHIPPED | OUTPATIENT
Start: 2020-04-30 | End: 2020-05-10

## 2020-05-06 ENCOUNTER — HOSPITAL ENCOUNTER (OUTPATIENT)
Dept: ULTRASOUND IMAGING | Age: 65
Discharge: HOME | End: 2020-05-06
Payer: MEDICARE

## 2020-05-06 ENCOUNTER — OFFICE VISIT (OUTPATIENT)
Dept: PRIMARY CARE CLINIC | Age: 65
End: 2020-05-06
Payer: MEDICARE

## 2020-05-06 VITALS
TEMPERATURE: 98.2 F | SYSTOLIC BLOOD PRESSURE: 118 MMHG | WEIGHT: 139 LBS | HEART RATE: 104 BPM | DIASTOLIC BLOOD PRESSURE: 64 MMHG | RESPIRATION RATE: 16 BRPM | BODY MASS INDEX: 25.42 KG/M2

## 2020-05-06 PROCEDURE — 1036F TOBACCO NON-USER: CPT | Performed by: FAMILY MEDICINE

## 2020-05-06 PROCEDURE — 99214 OFFICE O/P EST MOD 30 MIN: CPT | Performed by: FAMILY MEDICINE

## 2020-05-06 PROCEDURE — G8417 CALC BMI ABV UP PARAM F/U: HCPCS | Performed by: FAMILY MEDICINE

## 2020-05-06 PROCEDURE — 1090F PRES/ABSN URINE INCON ASSESS: CPT | Performed by: FAMILY MEDICINE

## 2020-05-06 PROCEDURE — 4040F PNEUMOC VAC/ADMIN/RCVD: CPT | Performed by: FAMILY MEDICINE

## 2020-05-06 PROCEDURE — 3017F COLORECTAL CA SCREEN DOC REV: CPT | Performed by: FAMILY MEDICINE

## 2020-05-06 PROCEDURE — 76705 ECHO EXAM OF ABDOMEN: CPT

## 2020-05-06 PROCEDURE — G8427 DOCREV CUR MEDS BY ELIG CLIN: HCPCS | Performed by: FAMILY MEDICINE

## 2020-05-06 PROCEDURE — 1123F ACP DISCUSS/DSCN MKR DOCD: CPT | Performed by: FAMILY MEDICINE

## 2020-05-06 PROCEDURE — G8399 PT W/DXA RESULTS DOCUMENT: HCPCS | Performed by: FAMILY MEDICINE
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
## 2020-05-12 ENCOUNTER — TELEPHONE (OUTPATIENT)
Dept: FAMILY MEDICINE CLINIC | Age: 65
End: 2020-05-12

## 2020-06-18 RX ORDER — OMEPRAZOLE 40 MG/1
40 CAPSULE, DELAYED RELEASE ORAL DAILY
Qty: 90 CAPSULE | Refills: 4 | Status: SHIPPED | OUTPATIENT
Start: 2020-06-18 | End: 2021-06-22

## 2020-07-15 ENCOUNTER — TELEPHONE (OUTPATIENT)
Dept: ADMINISTRATIVE | Age: 65
End: 2020-07-15

## 2020-07-15 NOTE — TELEPHONE ENCOUNTER
Patient is scheduled for 07/21/2020 and is wanting to r/s to the 27, 28 or 29th of July. She said that it was due to having to isolate? Patient was offered next available appointment which was 08/14/2020, but she wanted to be seen sooner.   Please advise, ALIN 04/30/2020  [phone]

## 2020-07-20 NOTE — TELEPHONE ENCOUNTER
Spoke with patient. Patient rescheduled to 7/29/. Patient asking if she should have abdominal ultrasound done prior to this appt.   Currently scheduled to have ultrasound 8/6/20

## 2020-07-30 ENCOUNTER — OFFICE VISIT (OUTPATIENT)
Dept: FAMILY MEDICINE CLINIC | Age: 65
End: 2020-07-30
Payer: MEDICARE

## 2020-07-30 VITALS
SYSTOLIC BLOOD PRESSURE: 118 MMHG | WEIGHT: 138.6 LBS | TEMPERATURE: 98.6 F | HEIGHT: 62 IN | DIASTOLIC BLOOD PRESSURE: 72 MMHG | HEART RATE: 92 BPM | RESPIRATION RATE: 16 BRPM | BODY MASS INDEX: 25.51 KG/M2

## 2020-07-30 PROCEDURE — 1036F TOBACCO NON-USER: CPT | Performed by: FAMILY MEDICINE

## 2020-07-30 PROCEDURE — G8427 DOCREV CUR MEDS BY ELIG CLIN: HCPCS | Performed by: FAMILY MEDICINE

## 2020-07-30 PROCEDURE — 1090F PRES/ABSN URINE INCON ASSESS: CPT | Performed by: FAMILY MEDICINE

## 2020-07-30 PROCEDURE — G8399 PT W/DXA RESULTS DOCUMENT: HCPCS | Performed by: FAMILY MEDICINE

## 2020-07-30 PROCEDURE — 3017F COLORECTAL CA SCREEN DOC REV: CPT | Performed by: FAMILY MEDICINE

## 2020-07-30 PROCEDURE — 99214 OFFICE O/P EST MOD 30 MIN: CPT | Performed by: FAMILY MEDICINE

## 2020-07-30 PROCEDURE — G8417 CALC BMI ABV UP PARAM F/U: HCPCS | Performed by: FAMILY MEDICINE

## 2020-07-30 PROCEDURE — 1123F ACP DISCUSS/DSCN MKR DOCD: CPT | Performed by: FAMILY MEDICINE

## 2020-07-30 PROCEDURE — 4040F PNEUMOC VAC/ADMIN/RCVD: CPT | Performed by: FAMILY MEDICINE

## 2020-07-30 RX ORDER — MONTELUKAST SODIUM 10 MG/1
10 TABLET ORAL NIGHTLY
COMMUNITY

## 2020-07-30 NOTE — PROGRESS NOTES
[address] [facility] MEDICINE  [address]  Dept: [phone]  Dept Fax: [phone]  Loc: Roberto Golden is a 77 y.o. female who presents today for:  Chief Complaint   Patient presents with    6 Month Follow-Up     HTN, Hypercholesterolemia, AFIB, GERD    Abdominal Pain     Upper left    Back Pain     Left mid back    Dizziness           HPI:     HPI    Hypertension is controlled on current medications. Afib under the management of cardiology. She reports the toprol was stopped but the cardiologist note reports that he was going to continue the beta blocker. Hyperlipidemia: Patient presents with hyperlipidemia. She was tested because family history. Her last labs showed   Lab Results   Component Value Date    CHOL 158 11/18/2019    CHOL 211 05/11/2019    CHOL 176 09/14/2017     Lab Results   Component Value Date    TRIG 80 11/18/2019    TRIG 101 05/11/2019    TRIG 80 09/14/2017     Lab Results   Component Value Date    HDL 62 11/18/2019    HDL 59 05/11/2019    HDL 65 09/14/2017     Lab Results   Component Value Date    LDLCALC 80 11/18/2019    LDLCALC 132 05/11/2019    [address] 09/14/2017     Lab Results   Component Value Date    VLDL 20 05/11/2019    VLDL 23 05/14/2016    VLDL 21 10/10/2015     Lab Results   Component Value Date    CHOLHDLRATIO 2.2 05/11/2019    CHOLHDLRATIO 4.0 10/05/2013     There is a family history of hyperlipidemia. There is a family history of early ischemia heart disease. Allergic Rhinitis: Louis Georges is here for evaluation of possible allergic rhinitis. Patient's symptoms include nasal congestion, postnasal drip and sinus pressure. These symptoms are seasonal. Current triggers include exposure to pollens. The patient has been suffering from these symptoms for approximately 3 months. The patient has tried over the counter medications with fair relief of symptoms. Immunotherapy has never been tried. The patient has never had nasal polyps. The patient has no history of asthma. The patient does not suffer from frequent sinopulmonary infections. The patient has not had sinus surgery in the past. The patient has no history of eczema. However, she is receiving allergy shots to help as the allergies get worse. This is helping so far this year. Arthritis is controlled with NSAIDs prn. GERD is controlled on PPI but she also has a fair amount of constipation leading to nausea and difficulty getting a urinary stream started. As long as she take stool softeners to keep a regular BM then the bladder works more efficiently. MVP last checked with echo in 8-2018. Having occasional heart flutter but follows with cardiology in February. Also noticing LUQ abdominal pain. She has had this in the past.  It is better with stretching and walking. She has been sitting more with the covid pandemic and since the birth of her 2 grandchildren 4 months ago. Reviewed chart forpast medical history , surgical history , allergies, social history , family history and medications. Health Maintenance   Topic Date Due    Shingles Vaccine (1 of 2) 07/30/2021 (Originally 6/22/2015)    Flu vaccine (1) 09/01/2020    Lipid screen  11/18/2020    Potassium monitoring  04/28/2021    Creatinine monitoring  04/28/2021    Pneumococcal 65+ years Vaccine (2 of 2 - PPSV23) 09/21/2021    Breast cancer screen  11/11/2021    Colon cancer screen colonoscopy  08/23/2028    DTaP/Tdap/Td vaccine (2 - Td) 01/21/2030    DEXA (modify frequency per FRAX score)  Completed    Hepatitis C screen  Completed    Hepatitis A vaccine  Aged Out    Hepatitis B vaccine  Aged Out    Hib vaccine  Aged Out    Meningococcal (ACWY) vaccine  Aged Out       Subjective:      Constitutional:Negative for fever, chills, diaphoresis, activity change, appetite change and fatigue.    HENT: Negative for hearing loss, ear pain, congestion, sore throat, rhinorrhea, postnasal drip and ear discharge. Eyes: Negative for photophobia and visual disturbance. Respiratory: Negative for cough, chest tightness, shortness of breath and wheezing. Cardiovascular: Negative for chest pain and leg swelling. Gastrointestinal: Negative for nausea, vomiting, abdominal pain, diarrhea and constipation. Genitourinary: Negative for dysuria, urgency and frequency. Neurological: Negative for weakness, light-headedness and headaches. Psychiatric/Behavioral: Negative for sleep disturbance. Objective:     Vitals:    07/30/20 1625   BP: 118/72   Site: Left Upper Arm   Position: Sitting   Cuff Size: Medium Adult   Pulse: 92   Resp: 16   Temp: 98.6 °F (37 °C)   TempSrc: Temporal   Weight: 138 lb 9.6 oz (62.9 kg)   Height: 5' 2.01\" (1.575 m)     Wt Readings from Last 3 Encounters:   07/30/20 138 lb 9.6 oz (62.9 kg)   05/06/20 139 lb (63 kg)   04/30/20 139 lb (63 kg)       Physical Exam  Constitutional: Vital signs are normal. She appears well-developed and well-nourished. She is active. HENT:   Head: Normocephalic and atraumatic. Right Ear: Tympanic membrane, external ear and ear canal normal. No drainage or tenderness. Left Ear: Tympanic membrane, external ear and ear canal normal. No drainage or tenderness. Nose: Nose normal. No mucosal edema or rhinorrhea. Mouth/Throat: Uvula is midline, oropharynx is clear and moist and mucous membranes are normal. Mucous membranes are not pale. Normal dentition. No posterior oropharyngeal edema or posterior oropharyngeal erythema. Eyes: Lids are normal. Right eye exhibits no chemosis and no discharge. Left eye exhibits no chemosis and no drainage. Right conjunctiva has no hemorrhage. Left conjunctiva has no hemorrhage. Right eye exhibits normal extraocular motion. Left eye exhibits normal extraocular motion. Right pupil is round and reactive. Left pupil is round and reactive.  Pupils are equal.   Cardiovascular: Normal rate, regular rhythm, S1 normal, S2 normal and normal heart sounds. Exam reveals no gallop. No murmur heard. Pulmonary/Chest: Effort normal and breath sounds normal. No respiratory distress. She has no wheezes. She has no rhonchi. She has no rales. Abdominal: Soft. Normal appearance and bowel sounds are normal. She exhibits no distension and no mass. There is no hepatosplenomegaly. No tenderness. She has no rigidity, no rebound and no guarding. No hernia. Musculoskeletal:        Right lower leg: She exhibits no edema. Left lower leg: She exhibits no edema. Neurological: She is alert. Assessment/Plan   hSarita Alcala was seen today for 6 month follow-up, abdominal pain, back pain and dizziness. Diagnoses and all orders for this visit:    Essential hypertension    Pure hypercholesterolemia    Paroxysmal atrial fibrillation (HCC)    Gastroesophageal reflux disease without esophagitis    Seasonal allergies    Primary osteoarthritis involving multiple joints    MVP (mitral valve prolapse)    Osteopenia determined by x-ray    LUQ abdominal pain    Asymptomatic postmenopausal state  -     MAMMO DEXA BONE DENSITY SCAN; Future    No change to medications   Continue healthy diet and exercise  Aspirin daily    [place] foods  Small meals  No late meals     Discussed use, benefit, and side effectsof prescribed medications. All patient questions answered. Pt voiced understanding. Reviewed health maintenance. Instructed to continue current medications, diet and exercise. Patient agreed with treatment plan. Followup as directed.      Electronically signed by Eric Navas MD

## 2020-08-06 ENCOUNTER — HOSPITAL ENCOUNTER (OUTPATIENT)
Dept: ULTRASOUND IMAGING | Age: 65
Discharge: HOME | End: 2020-08-06
Payer: MEDICARE

## 2020-08-06 PROCEDURE — 76705 ECHO EXAM OF ABDOMEN: CPT

## 2020-08-24 ENCOUNTER — TELEPHONE (OUTPATIENT)
Dept: FAMILY MEDICINE CLINIC | Age: 65
End: 2020-08-24

## 2020-08-24 RX ORDER — PRAVASTATIN SODIUM 20 MG
20 TABLET ORAL DAILY
Qty: 90 TABLET | Refills: 1 | Status: SHIPPED | OUTPATIENT
Start: 2020-08-24 | End: 2021-06-04

## 2020-08-24 NOTE — TELEPHONE ENCOUNTER
Erick Lot from [facility] called stating Livalo 2mg is not covered. Covered meds:  Pravastatin, Simvastatin, Atorvastatin and Rosuvastatin.     [facility] [phone]  Ref# [id number]

## 2020-08-24 NOTE — TELEPHONE ENCOUNTER
Called and spoke with pt, stated that she has tried both Crestor and Lipitor in the past and neither of those medications worked for her and gave her muscle cramps.      Pt stated that she will look in to pravastatin as she is not sure if she had tried this medication in the past.

## 2020-10-13 RX ORDER — APIXABAN 2.5 MG/1
TABLET, FILM COATED ORAL
Qty: 180 TABLET | Refills: 3 | Status: SHIPPED | OUTPATIENT
Start: 2020-10-13 | End: 2021-10-13

## 2021-01-04 DIAGNOSIS — I48.0 PAROXYSMAL ATRIAL FIBRILLATION (HCC): ICD-10-CM

## 2021-01-04 RX ORDER — METOPROLOL SUCCINATE 25 MG/1
12.5 TABLET, EXTENDED RELEASE ORAL DAILY
Qty: 90 TABLET | Refills: 1 | Status: SHIPPED | OUTPATIENT
Start: 2021-01-04 | End: 2022-02-09 | Stop reason: SDUPTHER

## 2021-01-04 NOTE — TELEPHONE ENCOUNTER
Nino Mandel called requesting a refill on the following medications:  Requested Prescriptions     Pending Prescriptions Disp Refills    metoprolol succinate (TOPROL XL) 25 MG extended release tablet 90 tablet 1     Sig: Take 0.5 tablets by mouth daily     Pharmacy verified: yes      Patient would like to have a refill, she is using as needed.        Date of last visit:   Date of next visit (if applicable): 1/6/4988

## 2021-01-27 ENCOUNTER — TELEPHONE (OUTPATIENT)
Dept: FAMILY MEDICINE CLINIC | Age: 66
End: 2021-01-27

## 2021-01-27 ENCOUNTER — TELEMEDICINE (OUTPATIENT)
Dept: FAMILY MEDICINE CLINIC | Age: 66
End: 2021-01-27
Payer: MEDICARE

## 2021-01-27 DIAGNOSIS — E78.00 PURE HYPERCHOLESTEROLEMIA: ICD-10-CM

## 2021-01-27 DIAGNOSIS — K21.9 GASTROESOPHAGEAL REFLUX DISEASE WITHOUT ESOPHAGITIS: ICD-10-CM

## 2021-01-27 DIAGNOSIS — N64.4 BREAST PAIN, LEFT: Primary | ICD-10-CM

## 2021-01-27 DIAGNOSIS — K59.09 OTHER CONSTIPATION: ICD-10-CM

## 2021-01-27 DIAGNOSIS — N64.4 BREAST PAIN, LEFT: ICD-10-CM

## 2021-01-27 DIAGNOSIS — M85.80 OSTEOPENIA DETERMINED BY X-RAY: ICD-10-CM

## 2021-01-27 DIAGNOSIS — I48.0 PAROXYSMAL ATRIAL FIBRILLATION (HCC): ICD-10-CM

## 2021-01-27 DIAGNOSIS — J30.2 SEASONAL ALLERGIES: ICD-10-CM

## 2021-01-27 DIAGNOSIS — I34.1 MVP (MITRAL VALVE PROLAPSE): ICD-10-CM

## 2021-01-27 DIAGNOSIS — I10 ESSENTIAL HYPERTENSION: Primary | ICD-10-CM

## 2021-01-27 DIAGNOSIS — M15.9 PRIMARY OSTEOARTHRITIS INVOLVING MULTIPLE JOINTS: ICD-10-CM

## 2021-01-27 PROCEDURE — 4040F PNEUMOC VAC/ADMIN/RCVD: CPT | Performed by: FAMILY MEDICINE

## 2021-01-27 PROCEDURE — 99214 OFFICE O/P EST MOD 30 MIN: CPT | Performed by: FAMILY MEDICINE

## 2021-01-27 PROCEDURE — 3017F COLORECTAL CA SCREEN DOC REV: CPT | Performed by: FAMILY MEDICINE

## 2021-01-27 PROCEDURE — G8399 PT W/DXA RESULTS DOCUMENT: HCPCS | Performed by: FAMILY MEDICINE

## 2021-01-27 PROCEDURE — 1090F PRES/ABSN URINE INCON ASSESS: CPT | Performed by: FAMILY MEDICINE

## 2021-01-27 PROCEDURE — G8427 DOCREV CUR MEDS BY ELIG CLIN: HCPCS | Performed by: FAMILY MEDICINE

## 2021-01-27 PROCEDURE — 1123F ACP DISCUSS/DSCN MKR DOCD: CPT | Performed by: FAMILY MEDICINE

## 2021-01-27 RX ORDER — PITAVASTATIN CALCIUM 2.09 MG/1
2 TABLET, FILM COATED ORAL NIGHTLY
Qty: 90 TABLET | Refills: 3 | Status: SHIPPED | OUTPATIENT
Start: 2021-01-27 | End: 2021-01-27 | Stop reason: SDUPTHER

## 2021-01-27 RX ORDER — PITAVASTATIN CALCIUM 2.09 MG/1
2 TABLET, FILM COATED ORAL NIGHTLY
Qty: 90 TABLET | Refills: 3 | Status: SHIPPED | OUTPATIENT
Start: 2021-01-27 | End: 2021-08-23

## 2021-01-27 NOTE — PROGRESS NOTES
2021    TELEHEALTH EVALUATION -- Audio/Visual (During - public health emergency)    HPI:    Lani Baltazar (:  1955) has requested an audio/video evaluation for the following concern(s):      Hypertension is controlled on current medications. Afib under the management of cardiology. She reports the toprol was stopped but the cardiologist note reports that he was going to continue the beta blocker. Hyperlipidemia: Patient presents with hyperlipidemia. She was tested because family history. Her last labs showed   Lab Results   Component Value Date    CHOL 158 2019    CHOL 211 2019    CHOL 176 2017     Lab Results   Component Value Date    TRIG 80 2019    TRIG 101 2019    TRIG 80 2017     Lab Results   Component Value Date    HDL 62 2019    HDL 59 2019    HDL 65 2017     Lab Results   Component Value Date    LDLCALC 80 2019    LDLCALC 132 2019    [address] 95 2017     Lab Results   Component Value Date    VLDL 20 2019    VLDL 23 2016    VLDL 21 10/10/2015     Lab Results   Component Value Date    CHOLHDLRATIO 2.2 2019    CHOLHDLRATIO 4.0 10/05/2013     There is a family history of hyperlipidemia. There is a family history of early ischemia heart disease. Allergic Rhinitis: Lani Baltazar is here for evaluation of possible allergic rhinitis. Patient's symptoms include nasal congestion, postnasal drip and sinus pressure. These symptoms are seasonal. Current triggers include exposure to pollens. The patient has been suffering from these symptoms for approximately 3 months. The patient has tried over the counter medications with fair relief of symptoms. Immunotherapy has never been tried. The patient has never had nasal polyps. The patient has no history of asthma. The patient does not suffer from frequent sinopulmonary infections.  The patient has not had sinus surgery in the past. The patient has no history of eczema. However, she is receiving allergy shots to help as the allergies get worse. This is helping so far this year. Arthritis is controlled with NSAIDs prn. GERD is controlled on PPI but she also has a fair amount of constipation leading to nausea and difficulty getting a urinary stream started. As long as she take stool softeners to keep a regular BM then the bladder works more efficiently and the GERD is easier to control. MVP last checked with echo in 8-2018. Having occasional heart flutter but follows with cardiology in February. Also worried about left nipple tenderness which was similar after an accident but this is not going away since December. Right side pain still on and off. No change. Patient eval with videovisit. Patient at home. Provider at office. Visit performed as a synchronous telecommunication system. Review of Systems  Constitutional: Negative for fever, chills, diaphoresis, activity change, appetite change and fatigue. HENT: Negative for hearing loss, ear pain, congestion, sore throat, rhinorrhea, postnasal drip and ear discharge. Eyes: Negative for photophobia and visual disturbance. Respiratory: Negative for cough, chest tightness, shortness of breath and wheezing. Cardiovascular: Negative for chest pain and leg swelling. Gastrointestinal: Negative for nausea, vomiting, abdominal pain, diarrhea and constipation. Genitourinary: Negative for dysuria, urgency and frequency. Neurological: Negative for weakness, light-headedness and headaches. Psychiatric/Behavioral: Negative for sleep disturbance. Prior to Visit Medications    Medication Sig Taking?  Authorizing Provider   pitavastatin (LIVALO) 2 MG TABS tablet Take 1 tablet by mouth nightly Yes Carlos Zaman MD   metoprolol succinate (TOPROL XL) 25 MG extended release tablet Take 0.5 tablets by mouth daily  Carlos Zaman MD   ELIQUIS 2.5 MG TABS tablet TAKE 1 TABLET  Allergic rhinitis     Bulging discs 2013    [facility]    Chickenpox     Essential hypertension 9/13/2017    Gastroesophageal reflux disease without esophagitis 5/23/2016    History of kidney stones     Hyperlipidemia     MVP (mitral valve prolapse)     Osteoarthritis     Pneumonia 1992    Recurrent UTI    ,   Past Surgical History:   Procedure Laterality Date    APPENDECTOMY  1996    BREAST LUMPECTOMY  1/1988    left - benign    CARDIOVASCULAR STRESS TEST  08/2018    COLONOSCOPY  08/2018    Polyps removed    DILATION AND CURETTAGE OF UTERUS      2-3 times    HERNIA REPAIR  2005    left inguinal    HYSTERECTOMY  1996    total for endometriosis    LAPAROSCOPY      open and scopes several    OTHER SURGICAL HISTORY      Titinum plates in back of c spine    PATELLAR TENDON REPAIR  1971    WISDOM TOOTH EXTRACTION     ,   Social History     Tobacco Use    Smoking status: Never Smoker    Smokeless tobacco: Never Used   Substance Use Topics    Alcohol use:  Yes     Alcohol/week: 0.0 standard drinks     Comment: socially    Drug use: No   ,   Family History   Problem Relation Age of Onset    Heart Disease Mother 61    Thyroid Disease Mother     Heart Disease Father 61    Kidney Disease Father     Cancer Maternal Grandfather 80        skin cancer    Glaucoma Maternal Grandfather     Stroke Paternal Grandmother 68    Rheum Arthritis Paternal Grandmother     Heart Disease Maternal Grandmother     Other Maternal Grandmother         Stomach Issues    Alzheimer's Disease Paternal Grandfather     Diabetes Sister         x2    High Blood Pressure Brother     High Cholesterol Brother     Diabetes Brother     Alzheimer's Disease Paternal Uncle    ,   Immunization History   Administered Date(s) Administered    Influenza, Quadv, IM, (6 mo and older Fluzone, Flulaval, Fluarix and 3 yrs and older Afluria) 09/21/2016    Influenza, Quadv, IM, PF (6 mo and older Fluzone, Flulaval, Fluarix, and 3 yrs and older Afluria) 09/13/2017    Influenza, Triv, inactivated, subunit, adjuvanted, IM (Fluad 65 yrs and older) 11/08/2019    Pneumococcal Conjugate 13-valent ([id number]) 07/17/2019    Pneumococcal Polysaccharide ([id number]) 09/21/2016    Tdap (Boostrix, Adacel) 01/21/2020    Varicella (Varivax) 04/27/2015   ,   Health Maintenance   Topic Date Due    Lipid screen  11/18/2020    Shingles Vaccine (1 of 2) 07/30/2021 (Originally 6/22/2015)    Potassium monitoring  04/28/2021    Creatinine monitoring  04/28/2021    Pneumococcal 65+ years Vaccine (2 of 2 - PPSV23) 09/21/2021    Breast cancer screen  01/28/2023    Colon cancer screen colonoscopy  08/23/2028    DTaP/Tdap/Td vaccine (2 - Td) 01/21/2030    DEXA (modify frequency per FRAX score)  Completed    Flu vaccine  Completed    Hepatitis C screen  Completed    Hepatitis A vaccine  Aged Out    Hepatitis B vaccine  Aged Out    Hib vaccine  Aged Out    Meningococcal (ACWY) vaccine  Aged Out       PHYSICAL EXAMINATION:  [ INSTRUCTIONS:  \"[x]\" Indicates a positive item  \"[]\" Indicates a negative item  -- DELETE ALL ITEMS NOT EXAMINED]  Vital Signs: (As obtained by patient/caregiver or practitioner observation)    Blood pressure-  Heart rate-    Respiratory rate-    Temperature-  Pulse oximetry-     Patient-Reported Vitals 1/27/2021   Patient-Reported Weight 137   Patient-Reported Height 5 2   Patient-Reported Systolic 886   Patient-Reported Diastolic 75   Patient-Reported Pulse 72   Patient-Reported Temperature 97.8   Patient-Reported SpO2 98        Constitutional: [x] Appears well-developed and well-nourished [x] No apparent distress      [] Abnormal-   Mental status  [x] Alert and awake  [x] Oriented to person/place/time [x]Able to follow commands      Eyes:  EOM    [x]  Normal  [] Abnormal-  Sclera  [x]  Normal  [] Abnormal -         Discharge [x]  None visible  [] Abnormal -    HENT:   [x] Normocephalic, atraumatic.   [] Abnormal   [x] Mouth/Throat: Mucous membranes are moist.     External Ears [x] Normal  [] Abnormal-     Neck: [x] No visualized mass     Pulmonary/Chest: [x] Respiratory effort normal.  [x] No visualized signs of difficulty breathing or respiratory distress        [] Abnormal-      Musculoskeletal:   [x] Normal gait with no signs of ataxia         [x] Normal range of motion of neck        [] Abnormal-       Neurological:        [x] No Facial Asymmetry (Cranial nerve 7 motor function) (limited exam to video visit)          [x] No gaze palsy        [] Abnormal-         Skin:        [x] No significant exanthematous lesions or discoloration noted on facial skin         [] Abnormal-            Psychiatric:       [x] Normal Affect [x] No Hallucinations        [] Abnormal-     Other pertinent observable physical exam findings-     ASSESSMENT/PLAN:  1. Essential hypertension  No change to medications   Continue healthy diet and exercise  Aspirin daily      2. Pure hypercholesterolemia  - pitavastatin (LIVALO) 2 MG TABS tablet; Take 1 tablet by mouth nightly  Dispense: 90 tablet; Refill: 3  - Comprehensive Metabolic Panel, Fasting; Future  - Lipid Panel; Future    3. Paroxysmal atrial fibrillation (HCC)  - TSH With Reflex Ft4; Future    4. Gastroesophageal reflux disease without esophagitis  - CBC; Future    [place] foods  Small meals  No late meals    5. Seasonal allergies    6. Primary osteoarthritis involving multiple joints    7. MVP (mitral valve prolapse)    8. Osteopenia determined by x-ray    9. Breast pain, left  Mammogram ordered    10. Other constipation      Return in about 6 months (around 7/27/2021). Sean Lee is a 77 y.o. female being evaluated by a Virtual Visit (video visit) encounter to address concerns as mentioned above. A caregiver was present when appropriate.  Due to this being a TeleHealth encounter ([facility]), evaluation of the following organ systems was limited: Vitals/Constitutional/EENT/Resp/CV/GI//MS/Neuro/Skin/Heme-Lymph-Imm. Pursuant to the emergency declaration under the [address] [place] [address] and the [employer] and [employer] Act, this Virtual Visit was conducted with patient's (and/or legal guardian's) consent, to reduce the patient's risk of exposure to COVID-19 and provide necessary medical care. The patient (and/or legal guardian) has also been advised to contact this office for worsening conditions or problems, and seek emergency medical treatment and/or call 911 if deemed necessary. Patient identification was verified at the start of the visit: Yes    Total time spent on this encounter: Not billed by time    Services were provided through a video synchronous discussion virtually to substitute for in-person clinic visit. Patient and provider were located at their individual homes. --Cinthya Barber MD on 1/30/2021 at 10:58 AM    An electronic signature was used to authenticate this note.

## 2021-01-28 ENCOUNTER — HOSPITAL ENCOUNTER (OUTPATIENT)
Dept: WOMENS IMAGING | Age: 66
Discharge: HOME | End: 2021-01-28
Payer: MEDICARE

## 2021-01-28 DIAGNOSIS — N64.4 BREAST PAIN, LEFT: ICD-10-CM

## 2021-01-28 PROCEDURE — G0279 TOMOSYNTHESIS, MAMMO: HCPCS

## 2021-01-28 PROCEDURE — 76642 ULTRASOUND BREAST LIMITED: CPT

## 2021-02-02 ENCOUNTER — NURSE ONLY (OUTPATIENT)
Dept: LAB | Age: 66
End: 2021-02-02

## 2021-02-02 DIAGNOSIS — K21.9 GASTROESOPHAGEAL REFLUX DISEASE WITHOUT ESOPHAGITIS: ICD-10-CM

## 2021-02-02 DIAGNOSIS — I48.0 PAROXYSMAL ATRIAL FIBRILLATION (HCC): ICD-10-CM

## 2021-02-02 DIAGNOSIS — E78.00 PURE HYPERCHOLESTEROLEMIA: ICD-10-CM

## 2021-02-02 LAB
ALBUMIN SERPL-MCNC: 4.2 G/DL (ref 3.5–5.1)
ALP BLD-CCNC: 48 U/L (ref 38–126)
ALT SERPL-CCNC: 21 U/L (ref 11–66)
ANION GAP SERPL CALCULATED.3IONS-SCNC: 7 MEQ/L (ref 8–16)
AST SERPL-CCNC: 19 U/L (ref 5–40)
BILIRUB SERPL-MCNC: 0.4 MG/DL (ref 0.3–1.2)
BILIRUBIN DIRECT: < 0.2 MG/DL (ref 0–0.3)
BUN BLDV-MCNC: 17 MG/DL (ref 7–22)
CALCIUM SERPL-MCNC: 9.3 MG/DL (ref 8.5–10.5)
CHLORIDE BLD-SCNC: 104 MEQ/L (ref 98–111)
CHOLESTEROL, TOTAL: 190 MG/DL (ref 100–199)
CO2: 29 MEQ/L (ref 23–33)
CREAT SERPL-MCNC: 0.6 MG/DL (ref 0.4–1.2)
ERYTHROCYTE [DISTWIDTH] IN BLOOD BY AUTOMATED COUNT: 12.4 % (ref 11.5–14.5)
ERYTHROCYTE [DISTWIDTH] IN BLOOD BY AUTOMATED COUNT: 42.9 FL (ref 35–45)
GFR SERPL CREATININE-BSD FRML MDRD: > 90 ML/MIN/1.73M2
GLUCOSE FASTING: 85 MG/DL (ref 70–108)
HCT VFR BLD CALC: 42.4 % (ref 37–47)
HDLC SERPL-MCNC: 62 MG/DL
HEMOGLOBIN: 14.2 GM/DL (ref 12–16)
LDL CHOLESTEROL CALCULATED: 107 MG/DL
MCH RBC QN AUTO: 31.6 PG (ref 26–33)
MCHC RBC AUTO-ENTMCNC: 33.5 GM/DL (ref 32.2–35.5)
MCV RBC AUTO: 94.2 FL (ref 81–99)
PLATELET # BLD: 222 THOU/MM3 (ref 130–400)
PMV BLD AUTO: 9.3 FL (ref 9.4–12.4)
POTASSIUM SERPL-SCNC: 4.5 MEQ/L (ref 3.5–5.2)
RBC # BLD: 4.5 MILL/MM3 (ref 4.2–5.4)
SODIUM BLD-SCNC: 140 MEQ/L (ref 135–145)
TOTAL PROTEIN: 6.9 G/DL (ref 6.1–8)
TRIGL SERPL-MCNC: 107 MG/DL (ref 0–199)
TSH SERPL DL<=0.05 MIU/L-ACNC: 2.21 UIU/ML (ref 0.4–4.2)
WBC # BLD: 6 THOU/MM3 (ref 4.8–10.8)

## 2021-02-17 ENCOUNTER — OFFICE VISIT (OUTPATIENT)
Dept: CARDIOLOGY CLINIC | Age: 66
End: 2021-02-17
Payer: MEDICARE

## 2021-02-17 VITALS
HEART RATE: 72 BPM | HEIGHT: 62 IN | WEIGHT: 138 LBS | BODY MASS INDEX: 25.4 KG/M2 | SYSTOLIC BLOOD PRESSURE: 130 MMHG | DIASTOLIC BLOOD PRESSURE: 78 MMHG

## 2021-02-17 DIAGNOSIS — I48.91 ATRIAL FIBRILLATION, UNSPECIFIED TYPE (HCC): Primary | ICD-10-CM

## 2021-02-17 PROCEDURE — G8484 FLU IMMUNIZE NO ADMIN: HCPCS | Performed by: INTERNAL MEDICINE

## 2021-02-17 PROCEDURE — G8399 PT W/DXA RESULTS DOCUMENT: HCPCS | Performed by: INTERNAL MEDICINE

## 2021-02-17 PROCEDURE — G8417 CALC BMI ABV UP PARAM F/U: HCPCS | Performed by: INTERNAL MEDICINE

## 2021-02-17 PROCEDURE — 3017F COLORECTAL CA SCREEN DOC REV: CPT | Performed by: INTERNAL MEDICINE

## 2021-02-17 PROCEDURE — 1036F TOBACCO NON-USER: CPT | Performed by: INTERNAL MEDICINE

## 2021-02-17 PROCEDURE — 1090F PRES/ABSN URINE INCON ASSESS: CPT | Performed by: INTERNAL MEDICINE

## 2021-02-17 PROCEDURE — 4040F PNEUMOC VAC/ADMIN/RCVD: CPT | Performed by: INTERNAL MEDICINE

## 2021-02-17 PROCEDURE — 1123F ACP DISCUSS/DSCN MKR DOCD: CPT | Performed by: INTERNAL MEDICINE

## 2021-02-17 PROCEDURE — 99213 OFFICE O/P EST LOW 20 MIN: CPT | Performed by: INTERNAL MEDICINE

## 2021-02-17 PROCEDURE — G8427 DOCREV CUR MEDS BY ELIG CLIN: HCPCS | Performed by: INTERNAL MEDICINE

## 2021-02-17 NOTE — PROGRESS NOTES
[address] [place] [address] [facility], [place] [address]  Dept: [phone]  Dept Fax: [phone]  Loc: [phone]    Visit Date: 2/17/2021    Ms. Florence Ansari is a 77 y.o. female  who presented for:  Chief Complaint   Patient presents with    Check-Up    Atrial Fibrillation       HPI:   74 yo F hx of  Afib on Eliquis is here for a follow up. Otherwise no symptoms. Denies any chest pain, sob, palpitations, lightheadedness, dizziness, orthopnea, PND or pedal edema.          Current Outpatient Medications:     pitavastatin (LIVALO) 2 MG TABS tablet, Take 1 tablet by mouth nightly, Disp: 90 tablet, Rfl: 3    metoprolol succinate (TOPROL XL) 25 MG extended release tablet, Take 0.5 tablets by mouth daily (Patient taking differently: Take 12.5 mg by mouth See Admin Instructions Take if needed 3-4 times a month ,), Disp: 90 tablet, Rfl: 1    ELIQUIS 2.5 MG TABS tablet, TAKE 1 TABLET TWICE A DAY, Disp: 180 tablet, Rfl: 3    montelukast (SINGULAIR) 10 MG tablet, Take 10 mg by mouth nightly, Disp: , Rfl:     omeprazole (PRILOSEC) 40 MG delayed release capsule, Take 1 capsule by mouth daily, Disp: 90 capsule, Rfl: 4    fluticasone (FLONASE) 50 MCG/ACT nasal spray, USE 2 SPRAYS IN EACH NOSTRIL ONCE DAILY (Patient taking differently: 1 spray ), Disp: 48 g, Rfl: 11    calcium carbonate (OSCAL) 500 MG TABS tablet, Take 500 mg by mouth daily Patient is taking 3 times weekly, Disp: , Rfl:     Psyllium (METAMUCIL FIBER PO), Take by mouth, Disp: , Rfl:     cetirizine (ZYRTEC) 5 MG tablet, Take 5 mg by mouth daily , Disp: , Rfl:     magnesium 30 MG tablet, Take 30 mg by mouth three times a week, Disp: , Rfl:     Coenzyme Q10 (CO Q 10) 100 MG CAPS, Take by mouth, Disp: 30 capsule, Rfl:     Acetaminophen (TYLENOL EXTRA STRENGTH PO), Take by mouth 2 times daily as needed , Disp: , Rfl:     famotidine (PEPCID AC MAXIMUM STRENGTH) 20 MG tablet, Take 20 mg by mouth daily , Disp: , Rfl:     therapeutic multivitamin-minerals (THERAGRAN-M) tablet, Take 1 tablet by mouth daily. , Disp: , Rfl:     CLIMARA 0.025 MG/24HR, Place 1 patch onto the skin once a week., Disp: , Rfl:     pravastatin (PRAVACHOL) 20 MG tablet, Take 1 tablet by mouth daily, Disp: 90 tablet, Rfl: 1    denosumab (PROLIA) 60 MG/ML SOLN SC injection, Inject 1 mL into the skin once for 1 dose, Disp: 1 mL, Rfl: 0    Past Medical History  Johnny Cueva  has a past medical history of Allergic rhinitis, Bulging discs, Chickenpox, Essential hypertension, Gastroesophageal reflux disease without esophagitis, History of kidney stones, Hyperlipidemia, MVP (mitral valve prolapse), Osteoarthritis, Pneumonia, and Recurrent UTI. Social History  Johnny Cueva  reports that she has never smoked. She has never used smokeless tobacco. She reports current alcohol use. She reports that she does not use drugs. Family History  Johnny Cueva family history includes Alzheimer's Disease in her paternal grandfather and paternal uncle; Cancer (age of onset: 80) in her maternal grandfather; Diabetes in her brother and sister; Glaucoma in her maternal grandfather; Heart Disease in her maternal grandmother; Heart Disease (age of onset: 61) in her father and mother; High Blood Pressure in her brother; High Cholesterol in her brother; Kidney Disease in her father; Other in her maternal grandmother; Rheum Arthritis in her paternal grandmother; Stroke (age of onset: 68) in her paternal grandmother; Thyroid Disease in her mother.     Past Surgical History   Past Surgical History:   Procedure Laterality Date    APPENDECTOMY  1996    BREAST LUMPECTOMY  1/1988    left - benign    CARDIOVASCULAR STRESS TEST  08/2018    COLONOSCOPY  08/2018    Polyps removed    DILATION AND CURETTAGE OF UTERUS      2-3 times    HERNIA REPAIR  2005    left inguinal    HYSTERECTOMY  1996    total for endometriosis    LAPAROSCOPY      open and scopes several    OTHER SURGICAL HISTORY Titinum plates in back of c spine    PATELLAR TENDON REPAIR  1971    WISDOM TOOTH EXTRACTION         Subjective:     REVIEW OF SYSTEMS  Constitutional: denies sweats, chills and fever  HENT: denies  congestion, sinus pressure, sneezing and sore throat. Eyes: denies  pain, discharge, redness and itching. Respiratory: denies apnea, cough  Gastrointestinal: denies blood in stool, constipation, diarrhea   Endocrine: denies cold intolerance, heat intolerance, polydipsia. Genitourinary: denies dysuria, enuresis, flank pain and hematuria. Musculoskeletal: denies arthralgias, joint swelling and neck pain. Neurological: denies numbness and headaches. Psychiatric/Behavioral: denies agitation, confusion, decreased concentration and dysphoric mood    All others reviewed and are negative. Objective:     /78   Pulse 72   Ht 5' 2\" (1.575 m)   Wt 138 lb (62.6 kg)   BMI 25.24 kg/m²     Wt Readings from Last 3 Encounters:   02/17/21 138 lb (62.6 kg)   07/30/20 138 lb 9.6 oz (62.9 kg)   05/06/20 139 lb (63 kg)     BP Readings from Last 3 Encounters:   02/17/21 130/78   07/30/20 118/72   05/06/20 118/64       PHYSICAL EXAM  Constitutional: Oriented to person, place, and time. Appears well-developed and well-nourished. HENT:   Head: Normocephalic and atraumatic. Eyes: EOM are normal. Pupils are equal, round, and reactive to light. Neck: Normal range of motion. Neck supple. No JVD present. Cardiovascular: Normal rate , normal heart sounds and intact distal pulses. Pulmonary/Chest: Effort normal and breath sounds normal. No respiratory distress. No wheezes. No rales. Abdominal: Soft. Bowel sounds are normal. No distension. There is no tenderness. Musculoskeletal: Normal range of motion. No edema. Neurological: Alert and oriented to person, place, and time. No cranial nerve deficit. Coordination normal.   Skin: Skin is warm and dry. Psychiatric: Normal mood and affect.        No results found Interpreting         Valerie Amaya MD                                   Physician     Procedure    Type of Study      TTE procedure:ECHOCARDIOGRAM COMPLETE 2D W DOPPLER W COLOR. Procedure Date  Date: 08/28/2018 Start: 09:48 AM    Study Location: Echo Lab  Technical Quality: Adequate visualization    Indications:Abnormal ECG and Mitral valve prolapse. Additional Medical History:Mitral valve prolapse, Osteoarthritis,  Hyperlipidemia, GERD, Hypertension, Family history of heart disease    Patient Status: Routine    Height: 61.81 inches Weight: 132 pounds BSA: 1.6 m^2 BMI: 24.29 kg/m^2    BP: 112/70 mmHg     Conclusions      Summary   Overall left ventricular function is normal.   Ejection fraction is visually estimated at 60%. Signature      ----------------------------------------------------------------   Electronically signed by Valerie Amaya MD (Interpreting   physician) on 08/29/2018 at 10:25 AM   ----------------------------------------------------------------      Findings      Mitral Valve   The mitral valve structure was normal with normal leaflet separation. DOPPLER: The transmitral velocity was within the normal range with no   evidence for mitral stenosis. There was no evidence of mitral   regurgitation. Aortic Valve   The aortic valve was trileaflet with normal thickness and cuspal   separation. DOPPLER: Transaortic velocity was within the normal range with   no evidence of aortic stenosis. There was no evidence of aortic   regurgitation. Tricuspid Valve   Mild tricuspid regurgitation. Pulmonic Valve   The pulmonic valve was not well visualized . Trivial pulmonic regurgitation visualized. Left Atrium   Left atrial size was normal.      Left Ventricle   Overall left ventricular function is normal.   Ejection fraction is visually estimated at 60%.       Right Atrium   Right atrial size was normal.      Right Ventricle   The right ventricular size was normal with normal systolic function and   wall thickness. Pericardial Effusion   The pericardium was normal in appearance with no evidence of a pericardial   effusion. Pleural Effusion   No evidence of pleural effusion. Aorta / Great Vessels   -Aortic root dimension within normal limits.   -The Pulmonary artery is within normal limits. -IVC size is within normal limits with normal respiratory phasic changes.      M-Mode/2D Measurements & Calculations      LV Diastolic   LV Systolic Dimension:    AV Cusp Separation: 1.6 cmLA   Dimension: 3.7 2.3 cm                    Dimension: 2.4 cmAO Root   cm             LV Volume Diastolic: 49.0 Dimension: 2.8 cmLA Area: 14.1   LV FS:37.8 %   ml                        cm^2   LV PW          LV Volume Systolic: 25.2   Diastolic: 0.9 ml   cm             LV EDV/LV EDV Index: 58.1   Septum         ml/36 m^2LV ESV/LV ESV    RV Diastolic Dimension: 2.1 cm   Diastolic: 0.8 Index: 30.5 ml/11 m^2   cm             EF Calculated: 68.9 %     LA/Aorta: 0.86                                               LA volume/Index: 34.1 ml /21m^2     Doppler Measurements & Calculations      MV Peak E-Wave: 98 cm/s    AV Peak Velocity: 102 LVOT Peak Velocity: 96.5   MV Peak A-Wave: 96.5 cm/s  cm/s                  cm/s   MV E/A Ratio: 1.02         AV Peak Gradient:     LVOT Peak Gradient: 4   MV Peak Gradient: 3.84     4.16 mmHg             mmHg   mmHg                                                    TV Peak E-Wave: 54.8 cm/s   MV Deceleration Time: 222                        TV Peak A-Wave: 63.1 cm/s   msec                              IVRT: 99 msec         TV Peak Gradient: 1.2                                                    mmHg   MV E' Septal Velocity: 6.4                       TR Velocity:216 cm/s   cm/s                       AV DVI (Vmax):0.95    TR Gradient:18.66 mmHg   MV A' Septal Velocity: 9.5                       PV Peak Velocity: 71.3   cm/s cm/s   MV E' Lateral Velocity:                          PV Peak Gradient: 2.03   9.5 cm/s                                         mmHg   MV A' Lateral Velocity:   12.2 cm/s   E/E' septal: 15.31                               AR ED Velocity: 115 cm/s   E/E' lateral: 10.32   MR Velocity: 441 cm/s     [id number]       STRESS:    CATH:    Assessment/Plan       Diagnosis Orders   1. Atrial fibrillation, unspecified type ([address]       Hx Afib on Eliquis  Palpitations     Reviewed Zio monitor  [id number]: 2  Currently on beta blocker and eliquis   Will continue  Continues to have intermittent palpitations  Discussed R/B/A  The patient is asked to make an attempt to improve diet and exercise patterns to aid in medical management of this problem. Advised more plant based nutrition/meditarrean diet   Advised patient to call office or seek immediate medical attention if there is any new onset of  any chest pain, sob, palpitations, lightheadedness, dizziness, orthopnea, PND or pedal edema. All medication side effects were discussed in details.     Thank youfor allowing me to participate in the care of this patient. Please do not hesitate to contact me for any further questions. Return in about 1 year (around 2/17/2022), or if symptoms worsen or fail to improve, for Regular follow up, Review testing.        Electronically signed by Tatiana Agarwal MD [facility]  2/17/2021 at 9:11 AM
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
## 2021-04-21 ENCOUNTER — HOSPITAL ENCOUNTER (OUTPATIENT)
Dept: WOMENS IMAGING | Age: 66
Discharge: HOME | End: 2021-04-21
Payer: MEDICARE

## 2021-04-21 DIAGNOSIS — Z78.0 ASYMPTOMATIC POSTMENOPAUSAL STATE: ICD-10-CM

## 2021-04-21 PROCEDURE — 77080 DXA BONE DENSITY AXIAL: CPT

## 2021-06-03 ENCOUNTER — NURSE TRIAGE (OUTPATIENT)
Dept: OTHER | Facility: CLINIC | Age: 66
End: 2021-06-03

## 2021-06-03 ENCOUNTER — TELEPHONE (OUTPATIENT)
Dept: FAMILY MEDICINE CLINIC | Age: 66
End: 2021-06-03
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
## 2021-06-03 NOTE — TELEPHONE ENCOUNTER
Received call from Kindra Wilkins at pre-service center [facility]) CAMI FIELD II.ROSEZARIA with [employer] Complaint. Brief description of triage: Left knee pain for 10 days, worse after activity. Triage indicates for patient to be seen within 24 hours. Advised patient to go to [facility]/walk-in clinic if unable to get appointment. Care advice provided, patient verbalizes understanding; denies any other questions or concerns; instructed to call back for any new or worsening symptoms. Writer provided warm transfer to Lam cuellar at [facility] for appointment scheduling. Attention Provider: Thank you for allowing me to participate in the care of your patient. The patient was connected to triage in response to information provided to the ECC. Please do not respond through this encounter as the response is not directed to a shared pool. Reason for Disposition   Very swollen joint    Answer Assessment - Initial Assessment Questions  1. LOCATION and RADIATION: \"Where is the pain located? \"       Left knee    2. QUALITY: \"What does the pain feel like? \"  (e.g., sharp, dull, aching, burning)      Feels weakness in knee upon standing    3. SEVERITY: \"How bad is the pain? \" \"What does it keep you from doing? \"   (Scale 1-10; or mild, moderate, severe)    -  MILD (1-3): doesn't interfere with normal activities     -  MODERATE (4-7): interferes with normal activities (e.g., work or school) or awakens from sleep, limping     -  SEVERE (8-10): excruciating pain, unable to do any normal activities, unable to walk      4/10    4. ONSET: \"When did the pain start? \" \"Does it come and go, or is it there all the time? \"      10 days    5. RECURRENT: \"Have you had this pain before? \" If so, ask: \"When, and what happened then? \"      Yes in the past, has had surgery on that knee    6. SETTING: \"Has there been any recent work, exercise or other activity that involved that part of the body? \"       Anay Mckay for a walk on Tuesday and now pain is worse    7.  AGGRAVATING FACTORS: \"What makes the knee pain worse? \" (e.g., walking, climbing stairs, running)      Standing increases pain to back of knee    8. ASSOCIATED SYMPTOMS: \"Is there any swelling or redness of the knee? \"      Swelling    9. OTHER SYMPTOMS: \"Do you have any other symptoms? \" (e.g., chest pain, difficulty breathing, fever, calf pain)      History of surgery on that knee    10. PREGNANCY: \"Is there any chance you are pregnant? \" \"When was your last menstrual period? \"        N/A    Protocols used: KNEE PAIN-ADULT-[place]

## 2021-06-03 NOTE — TELEPHONE ENCOUNTER
----- Message from Ashley Horner sent at 6/3/2021  2:31 PM EDT -----  Subject: Appointment Request    Reason for Call: Urgent Return from RN Triage    QUESTIONS  Type of Appointment? Established Patient  Reason for appointment request? No appointments available during search  Additional Information for Provider? return from nt . need to be seen for   swollen ankle. no one at office is answering. please return call asap  ---------------------------------------------------------------------------  --------------  CALL BACK INFO  What is the best way for the office to contact you? OK to leave message on   voicemail  Preferred Call Back Phone Number? [phone]  ---------------------------------------------------------------------------  --------------  SCRIPT ANSWERS  Patient needs to be seen today or tomorrow? Yes  Nurse Name? Larnell Apley  Have you been diagnosed with, awaiting test results for, or told that you   are suspected of having COVID-19 (Coronavirus)? (If patient has tested   negative or was tested as a requirement for work, school, or travel and   not based on symptoms, answer no)? No  Do you currently have flu-like symptoms including fever or chills, cough,   shortness of breath, difficulty breathing, or new loss of taste or smell? No  Have you had close contact with someone with COVID-19 in the last 14 days? No  (Service Expert  click yes below to proceed with Pose As Usual   Scheduling)?  Yes

## 2021-06-04 ENCOUNTER — OFFICE VISIT (OUTPATIENT)
Dept: FAMILY MEDICINE CLINIC | Age: 66
End: 2021-06-04
Payer: MEDICARE

## 2021-06-04 VITALS
WEIGHT: 138.4 LBS | DIASTOLIC BLOOD PRESSURE: 72 MMHG | HEART RATE: 84 BPM | OXYGEN SATURATION: 98 % | BODY MASS INDEX: 25.31 KG/M2 | SYSTOLIC BLOOD PRESSURE: 128 MMHG | RESPIRATION RATE: 14 BRPM

## 2021-06-04 DIAGNOSIS — M25.562 PAIN AND SWELLING OF LEFT KNEE: Primary | ICD-10-CM

## 2021-06-04 DIAGNOSIS — M25.462 PAIN AND SWELLING OF LEFT KNEE: Primary | ICD-10-CM

## 2021-06-04 PROCEDURE — 1090F PRES/ABSN URINE INCON ASSESS: CPT | Performed by: FAMILY MEDICINE

## 2021-06-04 PROCEDURE — 1123F ACP DISCUSS/DSCN MKR DOCD: CPT | Performed by: FAMILY MEDICINE

## 2021-06-04 PROCEDURE — G8417 CALC BMI ABV UP PARAM F/U: HCPCS | Performed by: FAMILY MEDICINE

## 2021-06-04 PROCEDURE — G8399 PT W/DXA RESULTS DOCUMENT: HCPCS | Performed by: FAMILY MEDICINE

## 2021-06-04 PROCEDURE — 4040F PNEUMOC VAC/ADMIN/RCVD: CPT | Performed by: FAMILY MEDICINE

## 2021-06-04 PROCEDURE — 1036F TOBACCO NON-USER: CPT | Performed by: FAMILY MEDICINE

## 2021-06-04 PROCEDURE — 3017F COLORECTAL CA SCREEN DOC REV: CPT | Performed by: FAMILY MEDICINE

## 2021-06-04 PROCEDURE — G8427 DOCREV CUR MEDS BY ELIG CLIN: HCPCS | Performed by: FAMILY MEDICINE

## 2021-06-04 PROCEDURE — 99213 OFFICE O/P EST LOW 20 MIN: CPT | Performed by: FAMILY MEDICINE

## 2021-06-04 SDOH — ECONOMIC STABILITY: FOOD INSECURITY: WITHIN THE PAST 12 MONTHS, YOU WORRIED THAT YOUR FOOD WOULD RUN OUT BEFORE YOU GOT MONEY TO BUY MORE.: PATIENT DECLINED

## 2021-06-04 SDOH — ECONOMIC STABILITY: FOOD INSECURITY: WITHIN THE PAST 12 MONTHS, THE FOOD YOU BOUGHT JUST DIDN'T LAST AND YOU DIDN'T HAVE MONEY TO GET MORE.: PATIENT DECLINED

## 2021-06-04 ASSESSMENT — PATIENT HEALTH QUESTIONNAIRE - PHQ9
1. LITTLE INTEREST OR PLEASURE IN DOING THINGS: 0
SUM OF ALL RESPONSES TO PHQ9 QUESTIONS 1 & 2: 1
SUM OF ALL RESPONSES TO PHQ QUESTIONS 1-9: 1
2. FEELING DOWN, DEPRESSED OR HOPELESS: 1

## 2021-06-04 ASSESSMENT — SOCIAL DETERMINANTS OF HEALTH (SDOH): HOW HARD IS IT FOR YOU TO PAY FOR THE VERY BASICS LIKE FOOD, HOUSING, MEDICAL CARE, AND HEATING?: PATIENT DECLINED

## 2021-06-04 NOTE — PROGRESS NOTES
[address] [place], [place] [address] Thomas Giang  Dept: [phone]  Dept Fax: [phone]  Loc: Senthil Yanes is a 74 y.o. female who presents today for:  Chief Complaint   Patient presents with    Knee Pain     left knee pain and swelling. Patient mentioned that it is very sore behind the knee and that it is very painful to straighten it. HPI:     HPI  Here for left knee pain and swelling getting worse since vacation in early April/may. This past 6 days it got worse. Took a walk 3 days ago with significant swelling. History of patella surgery 50 years ago. Using ice, tylenol, wrap and ibuprofen. Reviewed chart forpast medical history , surgical history , allergies, social history , family history and medications. Health Maintenance   Topic Date Due    Shingles Vaccine (1 of 2) 07/30/2021 (Originally 6/22/2015)    Pneumococcal 65+ years Vaccine (2 of 2 - PPSV23) 09/21/2021    Lipid screen  02/02/2022    Potassium monitoring  02/02/2022    Creatinine monitoring  02/02/2022    Breast cancer screen  01/28/2023    Colon cancer screen colonoscopy  08/23/2028    DTaP/Tdap/Td vaccine (2 - Td or Tdap) 01/21/2030    DEXA (modify frequency per FRAX score)  Completed    Flu vaccine  Completed    COVID-19 Vaccine  Completed    Hepatitis C screen  Completed    Hepatitis A vaccine  Aged Out    Hepatitis B vaccine  Aged Out    Hib vaccine  Aged Out    Meningococcal (ACWY) vaccine  Aged Out       Subjective:      Constitutional:Negative for fever, chills, diaphoresis, activity change, appetite change and fatigue. HENT: Negative for hearing loss, ear pain, congestion, sore throat, rhinorrhea, postnasal drip and ear discharge. Eyes: Negative for photophobia and visual disturbance. Respiratory: Negative for cough, chest tightness, shortness of breath and wheezing.     Cardiovascular: Negative Patient agreed with treatment plan. Followup as directed.      Electronically signed by Cathy Becerra MD

## 2021-06-07 ENCOUNTER — HOSPITAL ENCOUNTER (OUTPATIENT)
Dept: MRI IMAGING | Age: 66
Discharge: HOME | End: 2021-06-07
Payer: MEDICARE

## 2021-06-07 ENCOUNTER — TELEPHONE (OUTPATIENT)
Dept: FAMILY MEDICINE CLINIC | Age: 66
End: 2021-06-07

## 2021-06-07 DIAGNOSIS — M25.462 PAIN AND SWELLING OF LEFT KNEE: ICD-10-CM

## 2021-06-07 DIAGNOSIS — S83.282A ACUTE LATERAL MENISCUS TEAR OF LEFT KNEE, INITIAL ENCOUNTER: Primary | ICD-10-CM

## 2021-06-07 DIAGNOSIS — S83.242A ACUTE MEDIAL MENISCUS TEAR OF LEFT KNEE, INITIAL ENCOUNTER: ICD-10-CM

## 2021-06-07 DIAGNOSIS — S83.512A RUPTURE OF ANTERIOR CRUCIATE LIGAMENT OF LEFT KNEE, INITIAL ENCOUNTER: ICD-10-CM

## 2021-06-07 DIAGNOSIS — M25.562 PAIN AND SWELLING OF LEFT KNEE: ICD-10-CM

## 2021-06-07 PROCEDURE — 73721 MRI JNT OF LWR EXTRE W/O DYE: CPT

## 2021-06-07 NOTE — TELEPHONE ENCOUNTER
Pt scheduled for 06/08/2021 with Dr. Davene Nissen in [place].   Pt aware of appointment date/time

## 2021-06-07 NOTE — TELEPHONE ENCOUNTER
Refer to ortho ASAP  Tears in meniscus lateral and medial and possible tear in the ACL  Call patient

## 2021-06-08 ENCOUNTER — HOSPITAL ENCOUNTER (OUTPATIENT)
Age: 66
Discharge: HOME | End: 2021-06-08
Payer: MEDICARE

## 2021-06-08 ENCOUNTER — HOSPITAL ENCOUNTER (OUTPATIENT)
Dept: GENERAL RADIOLOGY | Age: 66
Discharge: HOME | End: 2021-06-08
Payer: MEDICARE

## 2021-06-08 DIAGNOSIS — M25.562 LEFT KNEE PAIN, UNSPECIFIED CHRONICITY: ICD-10-CM

## 2021-06-08 PROCEDURE — 73564 X-RAY EXAM KNEE 4 OR MORE: CPT

## 2021-06-22 DIAGNOSIS — K21.9 GASTROESOPHAGEAL REFLUX DISEASE WITHOUT ESOPHAGITIS: ICD-10-CM

## 2021-06-22 RX ORDER — OMEPRAZOLE 40 MG/1
CAPSULE, DELAYED RELEASE ORAL
Qty: 90 CAPSULE | Refills: 3 | Status: SHIPPED | OUTPATIENT
Start: 2021-06-22 | End: 2022-07-29

## 2021-07-01 ENCOUNTER — TELEPHONE (OUTPATIENT)
Dept: CARDIOLOGY CLINIC | Age: 66
End: 2021-07-01

## 2021-07-01 NOTE — TELEPHONE ENCOUNTER
Vinod Mccarty from [facility] pt has been on climara 1 patch weekly for over 10 years as its estrogen they are having her cut it in half and, she is wondering what you think about this with her being on eliquis. Recommendations.

## 2021-07-13 NOTE — TELEPHONE ENCOUNTER
Reviewed with Dr Idris Maier. Please clarify the exact medication and dosage. LM for nurse line at [facility].

## 2021-07-13 NOTE — TELEPHONE ENCOUNTER
Spoke to Dondra Schirmer and she states patient is taking Climara(Estrodial) 0.025mg 1 patch once a week. Please advise.

## 2021-07-15 NOTE — TELEPHONE ENCOUNTER
Reviewed with Dr Matilde Vidal: Ask if they can change the Estradial 0.025 mg (patch or vaginal) as this interacts with Eliquis. Jennifer Kitchen notified.       Please agree with verbal.

## 2021-08-12 ENCOUNTER — TELEPHONE (OUTPATIENT)
Dept: FAMILY MEDICINE CLINIC | Age: 66
End: 2021-08-12

## 2021-08-12 NOTE — TELEPHONE ENCOUNTER
Pt calling with concerns of increasing heart rate - states she has arthritis in knee that is giving her a lot of pain - she has an intolerance to nonsteroidal antiinflammatory drugs (causes nose bleeds) - she's been taking tylenol for pain    Pulse rate is usually in the 60's but over the last 2 weeks it has been going up - today it is 80 - states she feels tired - denies palpitations or chest pain- she is asking if this increase in pulse could be from knee pain which has become worse . Pt is looking into knee replacement surgery in [facility].     I told pt it is a possibility but I would route message to [facility] since Raissa Shaw is out of the office -     Please send response to nurses pool

## 2021-08-18 ENCOUNTER — TELEPHONE (OUTPATIENT)
Dept: FAMILY MEDICINE CLINIC | Age: 66
End: 2021-08-18

## 2021-08-18 NOTE — TELEPHONE ENCOUNTER
LM for patient to call office back to office an appointment with Dr. Aden Jones on 8/23/21 @ 1:45 PM.

## 2021-08-23 ENCOUNTER — OFFICE VISIT (OUTPATIENT)
Dept: FAMILY MEDICINE CLINIC | Age: 66
End: 2021-08-23
Payer: MEDICARE

## 2021-08-23 VITALS
RESPIRATION RATE: 14 BRPM | DIASTOLIC BLOOD PRESSURE: 80 MMHG | BODY MASS INDEX: 23.15 KG/M2 | OXYGEN SATURATION: 96 % | TEMPERATURE: 97.3 F | WEIGHT: 125.8 LBS | HEART RATE: 100 BPM | HEIGHT: 62 IN | SYSTOLIC BLOOD PRESSURE: 138 MMHG

## 2021-08-23 DIAGNOSIS — K21.9 GASTROESOPHAGEAL REFLUX DISEASE WITHOUT ESOPHAGITIS: ICD-10-CM

## 2021-08-23 DIAGNOSIS — L30.1 DYSHIDROSIS: ICD-10-CM

## 2021-08-23 DIAGNOSIS — M85.80 OSTEOPENIA DETERMINED BY X-RAY: ICD-10-CM

## 2021-08-23 DIAGNOSIS — J30.2 SEASONAL ALLERGIES: ICD-10-CM

## 2021-08-23 DIAGNOSIS — E78.00 PURE HYPERCHOLESTEROLEMIA: ICD-10-CM

## 2021-08-23 DIAGNOSIS — I34.1 MVP (MITRAL VALVE PROLAPSE): ICD-10-CM

## 2021-08-23 DIAGNOSIS — I10 ESSENTIAL HYPERTENSION: ICD-10-CM

## 2021-08-23 DIAGNOSIS — M15.9 PRIMARY OSTEOARTHRITIS INVOLVING MULTIPLE JOINTS: ICD-10-CM

## 2021-08-23 DIAGNOSIS — K59.09 OTHER CONSTIPATION: ICD-10-CM

## 2021-08-23 DIAGNOSIS — M25.512 ACUTE PAIN OF BOTH SHOULDERS: Primary | ICD-10-CM

## 2021-08-23 DIAGNOSIS — I48.0 PAROXYSMAL ATRIAL FIBRILLATION (HCC): ICD-10-CM

## 2021-08-23 DIAGNOSIS — M17.12 ARTHRITIS OF LEFT KNEE: ICD-10-CM

## 2021-08-23 DIAGNOSIS — M25.511 ACUTE PAIN OF BOTH SHOULDERS: Primary | ICD-10-CM

## 2021-08-23 PROCEDURE — G8399 PT W/DXA RESULTS DOCUMENT: HCPCS | Performed by: FAMILY MEDICINE

## 2021-08-23 PROCEDURE — G8420 CALC BMI NORM PARAMETERS: HCPCS | Performed by: FAMILY MEDICINE

## 2021-08-23 PROCEDURE — 99214 OFFICE O/P EST MOD 30 MIN: CPT | Performed by: FAMILY MEDICINE

## 2021-08-23 PROCEDURE — 3017F COLORECTAL CA SCREEN DOC REV: CPT | Performed by: FAMILY MEDICINE

## 2021-08-23 PROCEDURE — 1123F ACP DISCUSS/DSCN MKR DOCD: CPT | Performed by: FAMILY MEDICINE

## 2021-08-23 PROCEDURE — 1036F TOBACCO NON-USER: CPT | Performed by: FAMILY MEDICINE

## 2021-08-23 PROCEDURE — 4040F PNEUMOC VAC/ADMIN/RCVD: CPT | Performed by: FAMILY MEDICINE

## 2021-08-23 PROCEDURE — 1090F PRES/ABSN URINE INCON ASSESS: CPT | Performed by: FAMILY MEDICINE

## 2021-08-23 PROCEDURE — G8427 DOCREV CUR MEDS BY ELIG CLIN: HCPCS | Performed by: FAMILY MEDICINE

## 2021-08-23 RX ORDER — SENNOSIDES 8.6 MG
650 CAPSULE ORAL EVERY 8 HOURS PRN
COMMUNITY
End: 2021-11-01

## 2021-08-23 NOTE — PROGRESS NOTES
[address] [place] [address] Thomas Giang  Dept: [phone]  Dept Fax: [phone]  Loc: Lynn Lopez is a 74 y.o. female who presents today for:  Chief Complaint   Patient presents with    Neck Pain     x1 month intermittently     Shoulder Pain     both shoulders rt is worse x1 month intermittently            HPI:     HPI  Here for pain in both shoulders and neck for the past month. She is going to have her left knee replaced 9/13/21. The mornings are the worst for the shoulder pain as well as the knees/legs. Arm pain in the upper arms and neck. Tried:  Tylenol prn was not helping much.  mobic did help but she is on eliquis and started with nose bleeds and had to stop the mobic. Massage at home helps. Hypertension is controlled on current medications. Afib under the management of cardiology. She reports the toprol was stopped but the cardiologist note reports that he was going to continue the beta blocker. Hyperlipidemia: Patient presents with hyperlipidemia. She was tested because family history. Her last labs showed   Lab Results   Component Value Date    CHOL 190 02/02/2021    CHOL 158 11/18/2019    CHOL 211 05/11/2019     Lab Results   Component Value Date    TRIG 107 02/02/2021    TRIG 80 11/18/2019    TRIG 101 05/11/2019     Lab Results   Component Value Date    HDL 62 02/02/2021    HDL 62 11/18/2019    HDL 59 05/11/2019     Lab Results   Component Value Date    LDLCALC 107 02/02/2021    LDLCALC 80 11/18/2019    LDLCALC 132 05/11/2019     Lab Results   Component Value Date    VLDL 20 05/11/2019    VLDL 23 05/14/2016    VLDL 21 10/10/2015     Lab Results   Component Value Date    CHOLHDLRATIO 2.2 05/11/2019    CHOLHDLRATIO 4.0 10/05/2013     There is a family history of hyperlipidemia. There is a family history of early ischemia heart disease.     Allergic Rhinitis: Regulo Melendrez is here for evaluation of possible allergic rhinitis. Patient's symptoms include nasal congestion, postnasal drip and sinus pressure. These symptoms are seasonal. Current triggers include exposure to pollens. The patient has been suffering from these symptoms for approximately 3 months. The patient has tried over the counter medications with fair relief of symptoms. Immunotherapy has never been tried. The patient has never had nasal polyps. The patient has no history of asthma. The patient does not suffer from frequent sinopulmonary infections. The patient has not had sinus surgery in the past. The patient has no history of eczema. However, she is receiving allergy shots to help as the allergies get worse. This is helping so far this year. Arthritis is controlled with NSAIDs prn. GERD is controlled on PPI but she also has a fair amount of constipation leading to nausea and difficulty getting a urinary stream started. As long as she take stool softeners to keep a regular BM then the bladder works more efficiently. MVP last checked with echo in 8-2018. Having occasional heart flutter but follows with cardiology in February. Also noticing LUQ abdominal pain. She has had this in the past.  It is better with stretching and walking. She has been sitting more with the covid pandemic and since the birth of her 2 grandchildren in spring 2020. Reviewed chart forpast medical history , surgical history , allergies, social history , family history and medications.     Health Maintenance   Topic Date Due    Shingles Vaccine (1 of 2) 06/22/2015    Flu vaccine (1) 09/01/2021    Pneumococcal 65+ years Vaccine (2 of 2 - PPSV23) 09/21/2021    Potassium monitoring  02/02/2022    Creatinine monitoring  02/02/2022    Breast cancer screen  01/28/2023    Lipid screen  02/02/2026    Colon cancer screen colonoscopy  08/23/2028    DTaP/Tdap/Td vaccine (2 - Td or Tdap) 01/21/2030    DEXA (modify frequency per FRAX score)  Completed    COVID-19 Vaccine  Completed    Hepatitis C screen  Completed    Hepatitis A vaccine  Aged Out    Hepatitis B vaccine  Aged Out    Hib vaccine  Aged Out    Meningococcal (ACWY) vaccine  Aged Out       Subjective:      Constitutional:Negative for fever, chills, diaphoresis, activity change, appetite change and fatigue. HENT: Negative for hearing loss, ear pain, congestion, sore throat, rhinorrhea, postnasal drip and ear discharge. Eyes: Negative for photophobia and visual disturbance. Respiratory: Negative for cough, chest tightness, shortness of breath and wheezing. Cardiovascular: Negative for chest pain and leg swelling. Gastrointestinal: Negative for nausea, vomiting, abdominal pain, diarrhea and constipation. Genitourinary: Negative for dysuria, urgency and frequency. Neurological: Negative for weakness, light-headedness and headaches. Psychiatric/Behavioral: Negative for sleep disturbance.      :     Vitals:    08/23/21 1354   BP: 138/80   Site: Left Upper Arm   Position: Sitting   Cuff Size: Medium Adult   Pulse: 100   Resp: 14   Temp: 97.3 °F (36.3 °C)   TempSrc: Temporal   SpO2: 96%   Weight: 125 lb 12.8 oz (57.1 kg)   Height: 5' 2\" (1.575 m)     Wt Readings from Last 3 Encounters:   08/23/21 125 lb 12.8 oz (57.1 kg)   06/04/21 138 lb 6.4 oz (62.8 kg)   02/17/21 138 lb (62.6 kg)       Physical Exam  Constitutional: Vital signs are normal. She appears well-developed and well-nourished. She is active. HENT:   Head: Normocephalic and atraumatic. Right Ear: Tympanic membrane, external ear and ear canal normal. No drainage or tenderness. Left Ear: Tympanic membrane, external ear and ear canal normal. No drainage or tenderness. Nose: Nose normal. No mucosal edema or rhinorrhea. Mouth/Throat: Uvula is midline, oropharynx is clear and moist and mucous membranes are normal. Mucous membranes are not pale. Normal dentition.  No posterior oropharyngeal edema or posterior oropharyngeal erythema. Eyes: Lids are normal. Right eye exhibits no chemosis and no discharge. Left eye exhibits no chemosis and no drainage. Right conjunctiva has no hemorrhage. Left conjunctiva has no hemorrhage. Right eye exhibits normal extraocular motion. Left eye exhibits normal extraocular motion. Right pupil is round and reactive. Left pupil is round and reactive. Pupils are equal.   Cardiovascular: Normal rate, regular rhythm, S1 normal, S2 normal and normal heart sounds. Exam reveals no gallop. No murmur heard. Pulmonary/Chest: Effort normal and breath sounds normal. No respiratory distress. She has no wheezes. She has no rhonchi. She has no rales. Abdominal: Soft. Normal appearance and bowel sounds are normal. She exhibits no distension and no mass. There is no hepatosplenomegaly. No tenderness. She has no rigidity, no rebound and no guarding. No hernia. Musculoskeletal:   Shoulder girdle weakness 3/5 in internal and external rotation as well as abduction and adduction in both sides. Right lower leg: She exhibits no edema. Left lower leg: She exhibits no edema. Major arthritic deformity of the left knee. Neurological: She is alert. Assessment/Plan   Timo Palomo was seen today for neck pain and shoulder pain. Diagnoses and all orders for this visit:    Acute pain of both shoulders  -     XR SHOULDER RIGHT (MIN 2 VIEWS); Future  -     XR SHOULDER LEFT (MIN 2 VIEWS); Future    Essential hypertension    Pure hypercholesterolemia  -     Comprehensive Metabolic Panel, Fasting; Future  -     Lipid Panel; Future    Paroxysmal atrial fibrillation (HCC)    Gastroesophageal reflux disease without esophagitis  -     CBC;  Future    Seasonal allergies    Primary osteoarthritis involving multiple joints    MVP (mitral valve prolapse)  -     TSH With Reflex Ft4; Future    Osteopenia determined by x-ray    Other constipation    Dyshidrosis    Arthritis of left knee    stop livalo  Topical voltaren gel QID  Ice 2-3 times daily  Hopefully the shoulder pains are from leaning on the cane hardr due to knee pain. Will re-assess after knee surgery     Discussed use, benefit, and side effectsof prescribed medications. All patient questions answered. Pt voiced understanding. Reviewed health maintenance. Instructed to continue current medications, diet and exercise. Patient agreed with treatment plan. Followup as directed.      Electronically signed by Vamsi Gilman MD

## 2021-09-08 ENCOUNTER — OFFICE VISIT (OUTPATIENT)
Dept: CARDIOLOGY CLINIC | Age: 66
End: 2021-09-08
Payer: MEDICARE

## 2021-09-08 VITALS
HEIGHT: 62 IN | DIASTOLIC BLOOD PRESSURE: 80 MMHG | HEART RATE: 96 BPM | WEIGHT: 123 LBS | BODY MASS INDEX: 22.63 KG/M2 | SYSTOLIC BLOOD PRESSURE: 128 MMHG

## 2021-09-08 DIAGNOSIS — Z01.810 PREOP CARDIOVASCULAR EXAM: Primary | ICD-10-CM

## 2021-09-08 PROCEDURE — G8399 PT W/DXA RESULTS DOCUMENT: HCPCS | Performed by: INTERNAL MEDICINE

## 2021-09-08 PROCEDURE — 4040F PNEUMOC VAC/ADMIN/RCVD: CPT | Performed by: INTERNAL MEDICINE

## 2021-09-08 PROCEDURE — 3017F COLORECTAL CA SCREEN DOC REV: CPT | Performed by: INTERNAL MEDICINE

## 2021-09-08 PROCEDURE — 1090F PRES/ABSN URINE INCON ASSESS: CPT | Performed by: INTERNAL MEDICINE

## 2021-09-08 PROCEDURE — 99213 OFFICE O/P EST LOW 20 MIN: CPT | Performed by: INTERNAL MEDICINE

## 2021-09-08 PROCEDURE — G8427 DOCREV CUR MEDS BY ELIG CLIN: HCPCS | Performed by: INTERNAL MEDICINE

## 2021-09-08 PROCEDURE — G8420 CALC BMI NORM PARAMETERS: HCPCS | Performed by: INTERNAL MEDICINE

## 2021-09-08 PROCEDURE — 1036F TOBACCO NON-USER: CPT | Performed by: INTERNAL MEDICINE

## 2021-09-08 PROCEDURE — 1123F ACP DISCUSS/DSCN MKR DOCD: CPT | Performed by: INTERNAL MEDICINE

## 2021-09-08 NOTE — PROGRESS NOTES
Pt here for check up needs clearance for left  total knee sx noemy 9/13/21 in [place] asking to hold eliquis    Pt has started to hold meds for sx     Pt has lost weight past few months with pain no appetite,     Pt continues with shoulder pain, swelling in left knee and  ankle at times     Pt denies chest pain, dizziness, sob,

## 2021-09-08 NOTE — PROGRESS NOTES
[address] [facility] [address]  Dept: [phone]  Dept Fax: [phone]  Loc: [phone]    Visit Date: 9/8/2021    Ms. Brayan Jacobsen is a 74 y.o. female  who presented for:  Chief Complaint   Patient presents with    Check-Up    Atrial Fibrillation    Cardiac Clearance       HPI:   75 yo F hx of  Afib on Eliquis is here for a follow up. She is scheduled for L knee total replacement on 09/13/21. Otherwise no symptoms. Denies any chest pain, sob, palpitations, lightheadedness, dizziness, orthopnea, PND or pedal edema.          Current Outpatient Medications:     acetaminophen (TYLENOL 8 HOUR ARTHRITIS PAIN) 650 MG extended release tablet, Take 650 mg by mouth every 8 hours as needed for Pain, Disp: , Rfl:     omeprazole (PRILOSEC) 40 MG delayed release capsule, TAKE 1 CAPSULE DAILY, Disp: 90 capsule, Rfl: 3    metoprolol succinate (TOPROL XL) 25 MG extended release tablet, Take 0.5 tablets by mouth daily (Patient taking differently: Take 12.5 mg by mouth See Admin Instructions Take if needed 3-4 times a month ,), Disp: 90 tablet, Rfl: 1    ELIQUIS 2.5 MG TABS tablet, TAKE 1 TABLET TWICE A DAY, Disp: 180 tablet, Rfl: 3    montelukast (SINGULAIR) 10 MG tablet, Take 10 mg by mouth nightly, Disp: , Rfl:     fluticasone (FLONASE) 50 MCG/ACT nasal spray, USE 2 SPRAYS IN EACH NOSTRIL ONCE DAILY (Patient taking differently: 1 spray ), Disp: 48 g, Rfl: 11    calcium carbonate (OSCAL) 500 MG TABS tablet, Take 500 mg by mouth daily Patient is taking 3 times weekly, Disp: , Rfl:     cetirizine (ZYRTEC) 5 MG tablet, Take 5 mg by mouth daily , Disp: , Rfl:     magnesium 30 MG tablet, Take 30 mg by mouth three times a week, Disp: , Rfl:     Coenzyme Q10 (CO Q 10) 100 MG CAPS, Take by mouth , Disp: 30 capsule, Rfl:     Acetaminophen (TYLENOL EXTRA STRENGTH PO), Take by mouth 2 times daily as needed , Disp: , Rfl:     famotidine (PEPCID AC MAXIMUM STRENGTH) 20 MG tablet, Take 20 mg by mouth daily , Disp: , Rfl:     therapeutic multivitamin-minerals (THERAGRAN-M) tablet, Take 1 tablet by mouth daily. , Disp: , Rfl:     Past Medical History  Harjinder Mata  has a past medical history of Allergic rhinitis, Bulging discs, Chickenpox, Essential hypertension, Gastroesophageal reflux disease without esophagitis, History of kidney stones, Hyperlipidemia, MVP (mitral valve prolapse), Osteoarthritis, Pneumonia, and Recurrent UTI. Social History  Harjinder Mata  reports that she has never smoked. She has never used smokeless tobacco. She reports current alcohol use. She reports that she does not use drugs. Family History  Harjinder Mata family history includes Alzheimer's Disease in her paternal grandfather and paternal uncle; Cancer (age of onset: 80) in her maternal grandfather; Diabetes in her brother and sister; Glaucoma in her maternal grandfather; Heart Disease in her maternal grandmother; Heart Disease (age of onset: 61) in her father and mother; High Blood Pressure in her brother; High Cholesterol in her brother; Kidney Disease in her father; Other in her maternal grandmother; Rheum Arthritis in her paternal grandmother; Stroke (age of onset: 68) in her paternal grandmother; Thyroid Disease in her mother.     Past Surgical History   Past Surgical History:   Procedure Laterality Date    APPENDECTOMY  1996    BREAST LUMPECTOMY  1/1988    left - benign    CARDIOVASCULAR STRESS TEST  08/2018    COLONOSCOPY  08/2018    Polyps removed    DILATION AND CURETTAGE OF UTERUS      2-3 times    HERNIA REPAIR  2005    left inguinal    HYSTERECTOMY  1996    total for endometriosis    LAPAROSCOPY      open and scopes several    OTHER SURGICAL HISTORY      Titinum plates in back of c spine    PATELLAR TENDON REPAIR  1971    WISDOM TOOTH EXTRACTION         Subjective:     REVIEW OF SYSTEMS  Constitutional: denies sweats, chills and fever  HENT: denies  congestion, sinus pressure, sneezing and sore throat. Eyes: denies  pain, discharge, redness and itching. Respiratory: denies apnea, cough  Gastrointestinal: denies blood in stool, constipation, diarrhea   Endocrine: denies cold intolerance, heat intolerance, polydipsia. Genitourinary: denies dysuria, enuresis, flank pain and hematuria. Musculoskeletal: denies arthralgias, joint swelling and neck pain. Neurological: denies numbness and headaches. Psychiatric/Behavioral: denies agitation, confusion, decreased concentration and dysphoric mood    All others reviewed and are negative. Objective:     /80   Pulse 96   Ht 5' 2\" (1.575 m)   Wt 123 lb (55.8 kg)   BMI 22.50 kg/m²     Wt Readings from Last 3 Encounters:   09/08/21 123 lb (55.8 kg)   08/23/21 125 lb 12.8 oz (57.1 kg)   06/04/21 138 lb 6.4 oz (62.8 kg)     BP Readings from Last 3 Encounters:   09/08/21 128/80   08/23/21 138/80   06/04/21 128/72       PHYSICAL EXAM  Constitutional: Oriented to person, place, and time. Appears well-developed and well-nourished. HENT:   Head: Normocephalic and atraumatic. Eyes: EOM are normal. Pupils are equal, round, and reactive to light. Neck: Normal range of motion. Neck supple. No JVD present. Cardiovascular: Normal rate , normal heart sounds and intact distal pulses. Pulmonary/Chest: Effort normal and breath sounds normal. No respiratory distress. No wheezes. No rales. Abdominal: Soft. Bowel sounds are normal. No distension. There is no tenderness. Musculoskeletal: Normal range of motion. No edema. Neurological: Alert and oriented to person, place, and time. No cranial nerve deficit. Coordination normal.   Skin: Skin is warm and dry. Psychiatric: Normal mood and affect.        No results found for: CKTOTAL, CKMB, CKMBINDEX    Lab Results   Component Value Date    WBC 6.0 02/02/2021    RBC 4.50 02/02/2021    RBC 4.49 03/22/2012    HGB 14.2 02/02/2021    HCT 42.4 02/02/2021    MCV 94.2 02/02/2021    MCH 31.6 02/02/2021    MCHC 33.5 02/02/2021    RDW 13.5 04/28/2020     02/02/2021    MPV 9.3 02/02/2021       Lab Results   Component Value Date     02/02/2021    K 4.5 02/02/2021     02/02/2021    CO2 29 02/02/2021    BUN 17 02/02/2021    LABALBU 4.2 02/02/2021    LABALBU 4.3 03/22/2012    CREATININE 0.6 02/02/2021    CALCIUM 9.3 02/02/2021    LABGLOM >90 02/02/2021    GLUCOSE 94 04/28/2020    GLUCOSE 94 05/14/2016       Lab Results   Component Value Date    ALKPHOS 48 02/02/2021    ALT 21 02/02/2021    AST 19 02/02/2021    PROT 6.9 02/02/2021    BILITOT 0.4 02/02/2021    BILIDIR <0.2 02/02/2021    LABALBU 4.2 02/02/2021    LABALBU 4.3 03/22/2012       No results found for: MG    No results found for: INR, PROTIME      Lab Results   Component Value Date    LABA1C 5.4 05/11/2019       Lab Results   Component Value Date    TRIG 107 02/02/2021    HDL 62 02/02/2021    LDLCALC 107 02/02/2021       Lab Results   Component Value Date    TSH 2.210 02/02/2021         Testing Reviewed:      I haveindividually reviewed the below cardiac tests    EKG:    ECHO:   Results for orders placed during the hospital encounter of 08/28/18   Echocardiogram complete    Narrative Transthoracic Echocardiography Report (TTE)     Demographics      Patient Name    Daphne Mello  Gender               Female                   D      MR #            [id number]       Race                                                       Ethnicity      Account #       [de-identified]       Room Number      Accession       [id number]       Date of Study        08/28/2018   Number      Date of Birth   1955      Referring Physician  Tai Bennett MD      Age             70 year(s)      [facility] [place] RDCS                                      Interpreting         Maria L Skaggs MD                                   Physician     Procedure    Type of Study      TTE procedure:ECHOCARDIOGRAM COMPLETE 2D W DOPPLER W COLOR. Procedure Date  Date: 08/28/2018 Start: 09:48 AM    Study Location: Echo Lab  Technical Quality: Adequate visualization    Indications:Abnormal ECG and Mitral valve prolapse. Additional Medical History:Mitral valve prolapse, Osteoarthritis,  Hyperlipidemia, GERD, Hypertension, Family history of heart disease    Patient Status: Routine    Height: 61.81 inches Weight: 132 pounds BSA: 1.6 m^2 BMI: 24.29 kg/m^2    BP: 112/70 mmHg     Conclusions      Summary   Overall left ventricular function is normal.   Ejection fraction is visually estimated at 60%. Signature      ----------------------------------------------------------------   Electronically signed by Radha Pagan MD (Interpreting   physician) on 08/29/2018 at 10:25 AM   ----------------------------------------------------------------      Findings      Mitral Valve   The mitral valve structure was normal with normal leaflet separation. DOPPLER: The transmitral velocity was within the normal range with no   evidence for mitral stenosis. There was no evidence of mitral   regurgitation. Aortic Valve   The aortic valve was trileaflet with normal thickness and cuspal   separation. DOPPLER: Transaortic velocity was within the normal range with   no evidence of aortic stenosis. There was no evidence of aortic   regurgitation. Tricuspid Valve   Mild tricuspid regurgitation. Pulmonic Valve   The pulmonic valve was not well visualized . Trivial pulmonic regurgitation visualized. Left Atrium   Left atrial size was normal.      Left Ventricle   Overall left ventricular function is normal.   Ejection fraction is visually estimated at 60%. Right Atrium   Right atrial size was normal.      Right Ventricle   The right ventricular size was normal with normal systolic function and   wall thickness. Pericardial Effusion   The pericardium was normal in appearance with no evidence of a pericardial   effusion.       Pleural Effusion septal: 15.31                               AR ED Velocity: 115 cm/s   E/E' lateral: 10.32   MR Velocity: 441 cm/s     [email or web address] [id number]       STRESS:    CATH:    Assessment/Plan       Diagnosis Orders   1. Preop cardiovascular exam       Preop risk stratification for total knee replacement  Hx Afib on Eliquis  Palpitations     Reviewed Zio monitor  [id number]: 2  Currently on beta blocker and eliquis   May hold eliquis for 2 days prior to surgery  May proceed with scheduled L knee surgery at moderate risk   Continues to have intermittent palpitations  Discussed R/B/A  The patient is asked to make an attempt to improve diet and exercise patterns to aid in medical management of this problem. Advised more plant based nutrition/meditarrean diet   Advised patient to call office or seek immediate medical attention if there is any new onset of  any chest pain, sob, palpitations, lightheadedness, dizziness, orthopnea, PND or pedal edema. All medication side effects were discussed in details.     Thank youfor allowing me to participate in the care of this patient. Please do not hesitate to contact me for any further questions. Return in about 1 year (around 9/8/2022), or if symptoms worsen or fail to improve, for Regular follow up, Review testing.        Electronically signed by Jammie Perez MD [facility]  9/8/2021 at 9:11 AM

## 2021-10-13 DIAGNOSIS — I48.0 PAROXYSMAL ATRIAL FIBRILLATION (HCC): ICD-10-CM

## 2021-10-13 RX ORDER — APIXABAN 2.5 MG/1
TABLET, FILM COATED ORAL
Qty: 180 TABLET | Refills: 3 | Status: SHIPPED | OUTPATIENT
Start: 2021-10-13 | End: 2022-08-31 | Stop reason: SDUPTHER

## 2021-11-01 ENCOUNTER — HOSPITAL ENCOUNTER (OUTPATIENT)
Dept: GENERAL RADIOLOGY | Age: 66
Discharge: HOME | End: 2021-11-01
Payer: MEDICARE

## 2021-11-01 ENCOUNTER — OFFICE VISIT (OUTPATIENT)
Dept: FAMILY MEDICINE CLINIC | Age: 66
End: 2021-11-01
Payer: MEDICARE

## 2021-11-01 ENCOUNTER — HOSPITAL ENCOUNTER (OUTPATIENT)
Age: 66
Discharge: HOME | End: 2021-11-01
Payer: MEDICARE

## 2021-11-01 VITALS
WEIGHT: 120.2 LBS | DIASTOLIC BLOOD PRESSURE: 76 MMHG | HEIGHT: 62 IN | SYSTOLIC BLOOD PRESSURE: 128 MMHG | OXYGEN SATURATION: 99 % | BODY MASS INDEX: 22.12 KG/M2 | TEMPERATURE: 97.2 F | RESPIRATION RATE: 14 BRPM | HEART RATE: 87 BPM

## 2021-11-01 DIAGNOSIS — K21.9 GASTROESOPHAGEAL REFLUX DISEASE WITHOUT ESOPHAGITIS: ICD-10-CM

## 2021-11-01 DIAGNOSIS — M25.512 ACUTE PAIN OF BOTH SHOULDERS: ICD-10-CM

## 2021-11-01 DIAGNOSIS — M25.512 ACUTE PAIN OF BOTH SHOULDERS: Primary | ICD-10-CM

## 2021-11-01 DIAGNOSIS — M25.511 ACUTE PAIN OF BOTH SHOULDERS: Primary | ICD-10-CM

## 2021-11-01 DIAGNOSIS — I10 ESSENTIAL HYPERTENSION: ICD-10-CM

## 2021-11-01 DIAGNOSIS — E78.00 PURE HYPERCHOLESTEROLEMIA: ICD-10-CM

## 2021-11-01 DIAGNOSIS — M25.511 ACUTE PAIN OF BOTH SHOULDERS: ICD-10-CM

## 2021-11-01 DIAGNOSIS — I48.0 PAROXYSMAL ATRIAL FIBRILLATION (HCC): ICD-10-CM

## 2021-11-01 DIAGNOSIS — M54.2 NECK PAIN ON RIGHT SIDE: ICD-10-CM

## 2021-11-01 DIAGNOSIS — M15.9 PRIMARY OSTEOARTHRITIS INVOLVING MULTIPLE JOINTS: ICD-10-CM

## 2021-11-01 DIAGNOSIS — I34.1 MVP (MITRAL VALVE PROLAPSE): ICD-10-CM

## 2021-11-01 DIAGNOSIS — J30.2 SEASONAL ALLERGIES: ICD-10-CM

## 2021-11-01 DIAGNOSIS — K59.09 OTHER CONSTIPATION: ICD-10-CM

## 2021-11-01 DIAGNOSIS — L30.1 DYSHIDROSIS: ICD-10-CM

## 2021-11-01 DIAGNOSIS — M17.12 ARTHRITIS OF LEFT KNEE: ICD-10-CM

## 2021-11-01 DIAGNOSIS — M85.80 OSTEOPENIA DETERMINED BY X-RAY: ICD-10-CM

## 2021-11-01 PROCEDURE — 72050 X-RAY EXAM NECK SPINE 4/5VWS: CPT

## 2021-11-01 PROCEDURE — G8399 PT W/DXA RESULTS DOCUMENT: HCPCS | Performed by: FAMILY MEDICINE

## 2021-11-01 PROCEDURE — G8427 DOCREV CUR MEDS BY ELIG CLIN: HCPCS | Performed by: FAMILY MEDICINE

## 2021-11-01 PROCEDURE — 73030 X-RAY EXAM OF SHOULDER: CPT

## 2021-11-01 PROCEDURE — 4040F PNEUMOC VAC/ADMIN/RCVD: CPT | Performed by: FAMILY MEDICINE

## 2021-11-01 PROCEDURE — 72040 X-RAY EXAM NECK SPINE 2-3 VW: CPT

## 2021-11-01 PROCEDURE — 1090F PRES/ABSN URINE INCON ASSESS: CPT | Performed by: FAMILY MEDICINE

## 2021-11-01 PROCEDURE — 99214 OFFICE O/P EST MOD 30 MIN: CPT | Performed by: FAMILY MEDICINE

## 2021-11-01 PROCEDURE — 1123F ACP DISCUSS/DSCN MKR DOCD: CPT | Performed by: FAMILY MEDICINE

## 2021-11-01 PROCEDURE — G8484 FLU IMMUNIZE NO ADMIN: HCPCS | Performed by: FAMILY MEDICINE

## 2021-11-01 PROCEDURE — G8420 CALC BMI NORM PARAMETERS: HCPCS | Performed by: FAMILY MEDICINE

## 2021-11-01 PROCEDURE — 1036F TOBACCO NON-USER: CPT | Performed by: FAMILY MEDICINE

## 2021-11-01 PROCEDURE — 3017F COLORECTAL CA SCREEN DOC REV: CPT | Performed by: FAMILY MEDICINE

## 2021-11-01 NOTE — PROGRESS NOTES
[address] [place] [address] Thomas Giang  Dept: [phone]  Dept Fax: [phone]  Loc: Sammie Serrano is a 74 y.o. female who presents today for:  Chief Complaint   Patient presents with    Follow-up     pain of bilateral shoulder           HPI:     HPI    Here for pain in both shoulders and neck for the past month. She had her left knee replaced 9/13/21. The mornings are the worst for the shoulder pain as well as the knees/legs. Arm pain in the upper arms and neck. Tried:  Tylenol prn was not helping much.  mobic did help but she is on eliquis and started with nose bleeds and had to stop the mobic. Massage at home helps. Left knee is doing well. The neck and shoulders are slowly getting better which is a little better on celebrex. Left hand now has tingling and numbness in the 4th and 5th fingers. She originally thought it was due to using the cane and walker a lot but the left fingers are worse. Hypertension is controlled on current medications. Afib under the management of cardiology. She reports the toprol was stopped but the cardiologist note reports that he was going to continue the beta blocker. Hyperlipidemia: Patient presents with hyperlipidemia. She was tested because family history.   Her last labs showed   Lab Results   Component Value Date    CHOL 190 02/02/2021    CHOL 158 11/18/2019    CHOL 211 05/11/2019     Lab Results   Component Value Date    TRIG 107 02/02/2021    TRIG 80 11/18/2019    TRIG 101 05/11/2019     Lab Results   Component Value Date    HDL 62 02/02/2021    HDL 62 11/18/2019    HDL 59 05/11/2019     Lab Results   Component Value Date    LDLCALC 107 02/02/2021    LDLCALC 80 11/18/2019    LDLCALC 132 05/11/2019     Lab Results   Component Value Date    VLDL 20 05/11/2019    VLDL 23 05/14/2016    VLDL 21 10/10/2015     Lab Results   Component Value Date 01/21/2030    DEXA (modify frequency per FRAX score)  Completed    Hepatitis C screen  Completed    Hepatitis A vaccine  Aged Out    Hepatitis B vaccine  Aged Out    Hib vaccine  Aged Out    Meningococcal (ACWY) vaccine  Aged Out       Subjective:      Constitutional:Negative for fever, chills, diaphoresis, activity change, appetite change and fatigue. HENT: Negative for hearing loss, ear pain, congestion, sore throat, rhinorrhea, postnasal drip and ear discharge. Eyes: Negative for photophobia and visual disturbance. Respiratory: Negative for cough, chest tightness, shortness of breath and wheezing. Cardiovascular: Negative for chest pain and leg swelling. Gastrointestinal: Negative for nausea, vomiting, abdominal pain, diarrhea and constipation. Genitourinary: Negative for dysuria, urgency and frequency. Neurological: Negative for weakness, light-headedness and headaches. Psychiatric/Behavioral: Negative for sleep disturbance.      :     Vitals:    11/01/21 1403   BP: 128/76   Site: Left Upper Arm   Position: Sitting   Cuff Size: Medium Adult   Pulse: 87   Resp: 14   Temp: 97.2 °F (36.2 °C)   TempSrc: Temporal   SpO2: 99%   Weight: 120 lb 3.2 oz (54.5 kg)   Height: 5' 2\" (1.575 m)     Wt Readings from Last 3 Encounters:   11/01/21 120 lb 3.2 oz (54.5 kg)   09/08/21 123 lb (55.8 kg)   08/23/21 125 lb 12.8 oz (57.1 kg)       Physical Exam  Physical Exam   Constitutional: Vital signs are normal. She appears well-developed and well-nourished. She is active. HENT:   Head: Normocephalic and atraumatic. Right Ear: Tympanic membrane, external ear and ear canal normal. No drainage or tenderness. Left Ear: Tympanic membrane, external ear and ear canal normal. No drainage or tenderness. Nose: Nose normal. No mucosal edema or rhinorrhea. Mouth/Throat: Uvula is midline, oropharynx is clear and moist and mucous membranes are normal. Mucous membranes are not pale. Normal dentition.  No posterior oropharyngeal edema or posterior oropharyngeal erythema. Eyes: Lids are normal. Right eye exhibits no chemosis and no discharge. Left eye exhibits no chemosis and no drainage. Right conjunctiva has no hemorrhage. Left conjunctiva has no hemorrhage. Right eye exhibits normal extraocular motion. Left eye exhibits normal extraocular motion. Right pupil is round and reactive. Left pupil is round and reactive. Pupils are equal.   Cardiovascular: Normal rate, regular rhythm, S1 normal, S2 normal and normal heart sounds. Exam reveals no gallop. No murmur heard. Pulmonary/Chest: Effort normal and breath sounds normal. No respiratory distress. She has no wheezes. She has no rhonchi. She has no rales. Abdominal: Soft. Normal appearance and bowel sounds are normal. She exhibits no distension and no mass. There is no hepatosplenomegaly. No tenderness. She has no rigidity, no rebound and no guarding. No hernia. Musculoskeletal:        Right lower leg: She exhibits no edema. Left lower leg: She exhibits no edema. Neurological: She is alert. Skin:  Left knee incision is healing well . Assessment/Plan   Vignesh Chiu was seen today for follow-up. Diagnoses and all orders for this visit:    Acute pain of both shoulders  -     XR CERVICAL SPINE (4-5 VIEWS); Future    Essential hypertension    Pure hypercholesterolemia    Paroxysmal atrial fibrillation (HCC)    Gastroesophageal reflux disease without esophagitis    Seasonal allergies    Primary osteoarthritis involving multiple joints    MVP (mitral valve prolapse)    Osteopenia determined by x-ray    Other constipation    Dyshidrosis    Arthritis of left knee    Neck pain on right side  -     XR CERVICAL SPINE (4-5 VIEWS); Future      Rest the affected painful area. Apply cold compresses intermittently as needed. As pain recedes, begin normal activities slowly as tolerated.   Call if symptoms persist.    Discussed use, benefit, and side effectsof prescribed medications. All patient questions answered. Pt voiced understanding. Reviewed health maintenance. Instructed to continue current medications, diet and exercise. Patient agreed with treatment plan. Followup as directed.      Electronically signed by Hardeep Campos MD

## 2022-01-18 ENCOUNTER — TELEPHONE (OUTPATIENT)
Dept: FAMILY MEDICINE CLINIC | Age: 67
End: 2022-01-18

## 2022-01-18 DIAGNOSIS — U07.1 COVID: ICD-10-CM

## 2022-01-18 RX ORDER — DIPHENHYDRAMINE HYDROCHLORIDE 50 MG/ML
50 INJECTION INTRAMUSCULAR; INTRAVENOUS
Status: CANCELLED | OUTPATIENT
Start: 2022-01-18

## 2022-01-18 RX ORDER — ONDANSETRON 2 MG/ML
8 INJECTION INTRAMUSCULAR; INTRAVENOUS
Status: CANCELLED | OUTPATIENT
Start: 2022-01-18

## 2022-01-18 RX ORDER — SODIUM CHLORIDE 9 MG/ML
INJECTION, SOLUTION INTRAVENOUS CONTINUOUS
Status: CANCELLED | OUTPATIENT
Start: 2022-01-18

## 2022-01-18 RX ORDER — ALBUTEROL SULFATE 90 UG/1
4 AEROSOL, METERED RESPIRATORY (INHALATION) PRN
Status: CANCELLED | OUTPATIENT
Start: 2022-01-18

## 2022-01-18 RX ORDER — ACETAMINOPHEN 325 MG/1
650 TABLET ORAL
Status: CANCELLED | OUTPATIENT
Start: 2022-01-18

## 2022-01-18 RX ORDER — EPINEPHRINE 1 MG/ML
0.3 INJECTION, SOLUTION, CONCENTRATE INTRAVENOUS PRN
Status: CANCELLED | OUTPATIENT
Start: 2022-01-18

## 2022-01-18 NOTE — TELEPHONE ENCOUNTER
Offer regeneron or sotrovimab    Daily zinc, vitamin D and C  Push water and gatorade 60-80 oz daily  Eat 5-6 small meals daily with protein in all meals  Walk every 1.5 hours for 10-15 minutes  Call or return to clinic prn if these symptoms worsen or fail to improve as anticipated.

## 2022-01-18 NOTE — TELEPHONE ENCOUNTER
Patient aware and voiced understanding, no concerns voiced at this time. Gave order to [place] [facility] for 1/21/22 @ 1200    Patient aware and voiced understanding, no concerns voiced at this time.  Pt will review EUA at home prior to going to appt

## 2022-01-18 NOTE — TELEPHONE ENCOUNTER
----- Message from Rima Kb sent at 1/18/2022  9:37 AM EST -----  Subject: Message to Provider    QUESTIONS  Information for Provider? Pt tested positive for covid 01/18/2022. symptoms started Sunday night. raspy throat, minor headache, coughing. Didn't want to take an appt away from someone who needed it. Just wanted   to inform and possibly get recommendations   ---------------------------------------------------------------------------  --------------  CALL BACK INFO  What is the best way for the office to contact you? OK to leave message on   voicemail  Preferred Call Back Phone Number? [phone]  ---------------------------------------------------------------------------  --------------  SCRIPT ANSWERS  Relationship to Patient?  Self

## 2022-01-18 NOTE — TELEPHONE ENCOUNTER
Spoke with pt-states home covid test 1/17 was negative and today home covid test was positive    Her symptoms started late 1/16 evening Pt states she has congestion, cough, HA, scratchy throat, decreased appetite, fatigue and raspy voice.       Pt denies fever    Pt is taking singulair, Flonase BID, Zyrtec and Tylenol    Pt wanting to know if you have any other recommendations

## 2022-01-21 ENCOUNTER — HOSPITAL ENCOUNTER (OUTPATIENT)
Dept: GENERAL RADIOLOGY | Age: 67
Discharge: HOME | End: 2022-01-21
Payer: MEDICARE

## 2022-01-21 VITALS
SYSTOLIC BLOOD PRESSURE: 150 MMHG | OXYGEN SATURATION: 97 % | RESPIRATION RATE: 16 BRPM | TEMPERATURE: 96.8 F | HEART RATE: 70 BPM | DIASTOLIC BLOOD PRESSURE: 64 MMHG

## 2022-01-21 DIAGNOSIS — U07.1 COVID: Primary | ICD-10-CM

## 2022-01-21 PROCEDURE — M0243 CASIRIVI AND IMDEVI INFUSION: HCPCS

## 2022-01-21 PROCEDURE — 2580000003 HC RX 258: Performed by: FAMILY MEDICINE

## 2022-01-21 PROCEDURE — 96365 THER/PROPH/DIAG IV INF INIT: CPT

## 2022-01-21 PROCEDURE — 6360000002 HC RX W HCPCS: Performed by: FAMILY MEDICINE

## 2022-01-21 RX ORDER — SODIUM CHLORIDE 9 MG/ML
INJECTION, SOLUTION INTRAVENOUS CONTINUOUS
OUTPATIENT
Start: 2022-01-21

## 2022-01-21 RX ORDER — DIPHENHYDRAMINE HYDROCHLORIDE 50 MG/ML
50 INJECTION INTRAMUSCULAR; INTRAVENOUS
OUTPATIENT
Start: 2022-01-21

## 2022-01-21 RX ORDER — ONDANSETRON 2 MG/ML
8 INJECTION INTRAMUSCULAR; INTRAVENOUS
OUTPATIENT
Start: 2022-01-21

## 2022-01-21 RX ORDER — ALBUTEROL SULFATE 90 UG/1
4 AEROSOL, METERED RESPIRATORY (INHALATION) PRN
OUTPATIENT
Start: 2022-01-21

## 2022-01-21 RX ORDER — ACETAMINOPHEN 325 MG/1
650 TABLET ORAL
OUTPATIENT
Start: 2022-01-21

## 2022-01-21 RX ADMIN — CASIRIVIMAB AND IMDEVIMAB: 600; 600 INJECTION, SOLUTION, CONCENTRATE INTRAVENOUS at 12:05

## 2022-01-21 NOTE — PROGRESS NOTES
Met: yes   Safety:         (Environmental)   [place] to environment  [facility] ID band is correct and in place/ allergy band as needed   Assess for fall risk   Initiate fall precautions as applicable (fall band, side rails, etc.)   Call light within reach   Bed in low position/ wheels locked    Met: yes   Pain:        Assess pain level and characteristics   Administer analgesics as ordered   Assess effectiveness of pain management and report to MD as needed    Met: yes   Knowledge Deficit:   Assess baseline knowledge   Provide teaching at level of understanding   Provide teaching via preferred learning method   Evaluate teaching effectiveness    Met: yes   Hemodynamic/Respiratory Status:       (Pre and Post Procedure Monitoring)   Assess/Monitor vital signs and LOC   Assess Baseline SpO2 prior to any sedation   Obtain weight/height   Assess vital signs/ LOC until patient meets discharge criteria   Monitor procedure site and notify MD of any issues    Met: yes   Infection-Risk of Central Venous Catheter:   Monitor for infection signs and symptoms (catheter site redness, temperature elevation, etc)   Assess for infection risks   Educate regarding infection prevention   Manage central venous catheter (flushes/ dressing changes per protocol)

## 2022-01-21 NOTE — ED NOTES
No adverse reaction noted to medication. Pulse regular. Extremities warm. Respirations regular and quiet. Mucous membranes pink & moist. Alert and oriented times 3. No nausea or vomiting. Range of motion within patient's limits. Skin pink, warm and dry. Calm and cooperative.      Kavita Lema RN  01/21/22 2255

## 2022-02-08 PROBLEM — I48.0 PAROXYSMAL ATRIAL FIBRILLATION (HCC): Status: ACTIVE | Noted: 2022-02-08

## 2022-02-08 NOTE — PROGRESS NOTES
[address] [place] [address] Thomas Giang  Dept: [phone]  Dept Fax: [phone]  Loc: Tracie Clemens is a 74 y.o. female who presents today for:  Chief Complaint   Patient presents with    3 Month Follow-Up           HPI:     HPI    Here for pain in both shoulders and neck for the past month. She had her left knee replaced 9/13/21. The mornings are the worst for the shoulder pain as well as the knees/legs. Arm pain in the upper arms and neck. Tried:  Tylenol prn was not helping much.  mobic did help but she is on eliquis and started with nose bleeds and had to stop the mobic. Massage at home helps. Left knee is doing well. The neck and shoulders are slowly getting better which is a little better on celebrex. Left hand now has tingling and numbness in the 4th and 5th fingers. She originally thought it was due to using the cane and walker a lot but the left fingers are worse. Hypertension is controlled on current medications. Afib under the management of cardiology. She reports the toprol was stopped but the cardiologist note reports that he was going to continue the beta blocker. Hyperlipidemia: Patient presents with hyperlipidemia. She was tested because family history.   Her last labs showed   Lab Results   Component Value Date    CHOL 190 02/02/2021    CHOL 158 11/18/2019    CHOL 211 05/11/2019     Lab Results   Component Value Date    TRIG 107 02/02/2021    TRIG 80 11/18/2019    TRIG 101 05/11/2019     Lab Results   Component Value Date    HDL 62 02/02/2021    HDL 62 11/18/2019    HDL 59 05/11/2019     Lab Results   Component Value Date    LDLCALC 107 02/02/2021    LDLCALC 80 11/18/2019    LDLCALC 132 05/11/2019     Lab Results   Component Value Date    VLDL 20 05/11/2019    VLDL 23 05/14/2016    VLDL 21 10/10/2015     Lab Results   Component Value Date    CHOLHDLRATIO 2.2 05/11/2019    CHOLHDLRATIO 4.0 10/05/2013     There is a family history of hyperlipidemia. There is a family history of early ischemia heart disease. Allergic Rhinitis: Adarsh Stevens is here for evaluation of possible allergic rhinitis. Patient's symptoms include nasal congestion, postnasal drip and sinus pressure. These symptoms are seasonal. Current triggers include exposure to pollens. The patient has been suffering from these symptoms for approximately 3 months. The patient has tried over the counter medications with fair relief of symptoms. Immunotherapy has never been tried. The patient has never had nasal polyps. The patient has no history of asthma. The patient does not suffer from frequent sinopulmonary infections. The patient has not had sinus surgery in the past. The patient has no history of eczema. However, she is receiving allergy shots to help as the allergies get worse. This is helping so far this year. Arthritis is controlled with NSAIDs prn. GERD is controlled on PPI but she also has a fair amount of constipation leading to nausea and difficulty getting a urinary stream started. As long as she take stool softeners to keep a regular BM then the bladder works more efficiently. MVP last checked with echo in 8-2018. Having occasional heart flutter but follows with cardiology in august 2022. Reviewed chart forpast medical history , surgical history , allergies, social history , family history and medications.     Health Maintenance   Topic Date Due    Shingles Vaccine (1 of 2) 06/22/2015    Pneumococcal 65+ years Vaccine (2 of 2 - PPSV23) 09/21/2021    Annual Wellness Visit (AWV)  02/09/2022    Depression Screen  06/04/2022    Potassium monitoring  08/25/2022    Creatinine monitoring  08/25/2022    Breast cancer screen  01/28/2023    Lipid screen  02/02/2026    Colon cancer screen colonoscopy  08/23/2028    DTaP/Tdap/Td vaccine (2 - Td or Tdap) 01/21/2030    DEXA (modify frequency per FRAX score)  Completed    Flu vaccine  Completed    COVID-19 Vaccine  Completed    Hepatitis C screen  Completed    Hepatitis A vaccine  Aged Out    Hepatitis B vaccine  Aged Out    Hib vaccine  Aged Out    Meningococcal (ACWY) vaccine  Aged Out       Subjective:      Constitutional:Negative for fever, chills, diaphoresis, activity change, appetite change and fatigue. HENT: Negative for hearing loss, ear pain, congestion, sore throat, rhinorrhea, postnasal drip and ear discharge. Eyes: Negative for photophobia and visual disturbance. Respiratory: Negative for cough, chest tightness, shortness of breath and wheezing. Cardiovascular: Negative for chest pain and leg swelling. Gastrointestinal: Negative for nausea, vomiting, abdominal pain, diarrhea and constipation. Genitourinary: Negative for dysuria, urgency and frequency. Neurological: Negative for weakness, light-headedness and headaches. Psychiatric/Behavioral: Negative for sleep disturbance.      :     Vitals:    02/09/22 1302   BP: 126/84   Site: Left Upper Arm   Position: Sitting   Cuff Size: Medium Adult   Pulse: 78   Resp: 16   Temp: 96.9 °F (36.1 °C)   TempSrc: Temporal   SpO2: 98%   Weight: 122 lb 6.4 oz (55.5 kg)     Wt Readings from Last 3 Encounters:   02/09/22 122 lb 6.4 oz (55.5 kg)   11/01/21 120 lb 3.2 oz (54.5 kg)   09/08/21 123 lb (55.8 kg)       Physical Exam  Physical Exam   Constitutional: Vital signs are normal. She appears well-developed and well-nourished. She is active. HENT:   Head: Normocephalic and atraumatic. Right Ear: Tympanic membrane, external ear and ear canal normal. No drainage or tenderness. Left Ear: Tympanic membrane, external ear and ear canal normal. No drainage or tenderness. Nose: Nose normal. No mucosal edema or rhinorrhea. Mouth/Throat: Uvula is midline, oropharynx is clear and moist and mucous membranes are normal. Mucous membranes are not pale. Normal dentition. No posterior oropharyngeal edema or posterior oropharyngeal erythema. Eyes: Lids are normal. Right eye exhibits no chemosis and no discharge. Left eye exhibits no chemosis and no drainage. Right conjunctiva has no hemorrhage. Left conjunctiva has no hemorrhage. Right eye exhibits normal extraocular motion. Left eye exhibits normal extraocular motion. Right pupil is round and reactive. Left pupil is round and reactive. Pupils are equal.   Cardiovascular: Normal rate, regular rhythm, S1 normal, S2 normal and normal heart sounds. Exam reveals no gallop. No murmur heard. Pulmonary/Chest: Effort normal and breath sounds normal. No respiratory distress. She has no wheezes. She has no rhonchi. She has no rales. Abdominal: Soft. Normal appearance and bowel sounds are normal. She exhibits no distension and no mass. There is no hepatosplenomegaly. No tenderness. She has no rigidity, no rebound and no guarding. No hernia. Musculoskeletal:        Right lower leg: She exhibits no edema. Left lower leg: She exhibits no edema. Neurological: She is alert. Assessment/Plan   Joseph Mario was seen today for 3 month follow-up. Diagnoses and all orders for this visit:    Essential hypertension    Paroxysmal atrial fibrillation (HCC)  -     Discontinue: metoprolol succinate (TOPROL XL) 25 MG extended release tablet; Take 0.5 tablets by mouth See Admin Instructions Take if needed 3-4 times a month ,    *PT reports taking 3 x's a week*  -     metoprolol succinate (TOPROL XL) 25 MG extended release tablet;  Take 0.5 tablets by mouth See Admin Instructions PT reports taking 3 x's a week*    Pure hypercholesterolemia    Gastroesophageal reflux disease without esophagitis    Seasonal allergies    Primary osteoarthritis involving multiple joints    MVP (mitral valve prolapse)    Osteopenia determined by x-ray    Other constipation    Dyshidrosis    Incisional hernia, without obstruction or gangrene    No change to medications   Continue healthy diet and exercise  Aspirin daily    Discussed use, benefit, and side effectsof prescribed medications. All patient questions answered. Pt voiced understanding. Reviewed health maintenance. Instructed to continue current medications, diet and exercise. Patient agreed with treatment plan. Followup as directed.      Electronically signed by Jared العراقي MD

## 2022-02-09 ENCOUNTER — OFFICE VISIT (OUTPATIENT)
Dept: FAMILY MEDICINE CLINIC | Age: 67
End: 2022-02-09
Payer: MEDICARE

## 2022-02-09 ENCOUNTER — NURSE ONLY (OUTPATIENT)
Dept: LAB | Age: 67
End: 2022-02-09

## 2022-02-09 VITALS
SYSTOLIC BLOOD PRESSURE: 126 MMHG | OXYGEN SATURATION: 98 % | HEART RATE: 78 BPM | DIASTOLIC BLOOD PRESSURE: 84 MMHG | RESPIRATION RATE: 16 BRPM | TEMPERATURE: 96.9 F | WEIGHT: 122.4 LBS | BODY MASS INDEX: 22.39 KG/M2

## 2022-02-09 DIAGNOSIS — I48.0 PAROXYSMAL ATRIAL FIBRILLATION (HCC): ICD-10-CM

## 2022-02-09 DIAGNOSIS — M85.80 OSTEOPENIA DETERMINED BY X-RAY: ICD-10-CM

## 2022-02-09 DIAGNOSIS — M15.9 PRIMARY OSTEOARTHRITIS INVOLVING MULTIPLE JOINTS: ICD-10-CM

## 2022-02-09 DIAGNOSIS — J30.2 SEASONAL ALLERGIES: ICD-10-CM

## 2022-02-09 DIAGNOSIS — L30.1 DYSHIDROSIS: ICD-10-CM

## 2022-02-09 DIAGNOSIS — K43.2 INCISIONAL HERNIA, WITHOUT OBSTRUCTION OR GANGRENE: ICD-10-CM

## 2022-02-09 DIAGNOSIS — E78.00 PURE HYPERCHOLESTEROLEMIA: ICD-10-CM

## 2022-02-09 DIAGNOSIS — I34.1 MVP (MITRAL VALVE PROLAPSE): ICD-10-CM

## 2022-02-09 DIAGNOSIS — K21.9 GASTROESOPHAGEAL REFLUX DISEASE WITHOUT ESOPHAGITIS: ICD-10-CM

## 2022-02-09 DIAGNOSIS — I10 ESSENTIAL HYPERTENSION: Primary | ICD-10-CM

## 2022-02-09 DIAGNOSIS — K59.09 OTHER CONSTIPATION: ICD-10-CM

## 2022-02-09 LAB
ALBUMIN SERPL-MCNC: 4.4 G/DL (ref 3.5–5.1)
ALP BLD-CCNC: 77 U/L (ref 38–126)
ALT SERPL-CCNC: 23 U/L (ref 11–66)
ANION GAP SERPL CALCULATED.3IONS-SCNC: 10 MEQ/L (ref 8–16)
AST SERPL-CCNC: 21 U/L (ref 5–40)
BILIRUB SERPL-MCNC: 0.4 MG/DL (ref 0.3–1.2)
BUN BLDV-MCNC: 15 MG/DL (ref 7–22)
CALCIUM SERPL-MCNC: 9.6 MG/DL (ref 8.5–10.5)
CHLORIDE BLD-SCNC: 103 MEQ/L (ref 98–111)
CHOLESTEROL, TOTAL: 257 MG/DL (ref 100–199)
CO2: 26 MEQ/L (ref 23–33)
CREAT SERPL-MCNC: 0.7 MG/DL (ref 0.4–1.2)
ERYTHROCYTE [DISTWIDTH] IN BLOOD BY AUTOMATED COUNT: 13.2 % (ref 11.5–14.5)
ERYTHROCYTE [DISTWIDTH] IN BLOOD BY AUTOMATED COUNT: 44.8 FL (ref 35–45)
GFR SERPL CREATININE-BSD FRML MDRD: 83 ML/MIN/1.73M2
GLUCOSE FASTING: 85 MG/DL (ref 70–108)
HCT VFR BLD CALC: 44.6 % (ref 37–47)
HDLC SERPL-MCNC: 70 MG/DL
HEMOGLOBIN: 14.8 GM/DL (ref 12–16)
LDL CHOLESTEROL CALCULATED: 164 MG/DL
MCH RBC QN AUTO: 30.8 PG (ref 26–33)
MCHC RBC AUTO-ENTMCNC: 33.2 GM/DL (ref 32.2–35.5)
MCV RBC AUTO: 92.9 FL (ref 81–99)
PLATELET # BLD: 283 THOU/MM3 (ref 130–400)
PMV BLD AUTO: 8.8 FL (ref 9.4–12.4)
POTASSIUM SERPL-SCNC: 4 MEQ/L (ref 3.5–5.2)
RBC # BLD: 4.8 MILL/MM3 (ref 4.2–5.4)
SODIUM BLD-SCNC: 139 MEQ/L (ref 135–145)
TOTAL PROTEIN: 7.4 G/DL (ref 6.1–8)
TRIGL SERPL-MCNC: 113 MG/DL (ref 0–199)
TSH SERPL DL<=0.05 MIU/L-ACNC: 1.55 UIU/ML (ref 0.4–4.2)
WBC # BLD: 4.9 THOU/MM3 (ref 4.8–10.8)

## 2022-02-09 PROCEDURE — 1090F PRES/ABSN URINE INCON ASSESS: CPT | Performed by: FAMILY MEDICINE

## 2022-02-09 PROCEDURE — G8420 CALC BMI NORM PARAMETERS: HCPCS | Performed by: FAMILY MEDICINE

## 2022-02-09 PROCEDURE — G8427 DOCREV CUR MEDS BY ELIG CLIN: HCPCS | Performed by: FAMILY MEDICINE

## 2022-02-09 PROCEDURE — 4040F PNEUMOC VAC/ADMIN/RCVD: CPT | Performed by: FAMILY MEDICINE

## 2022-02-09 PROCEDURE — 1123F ACP DISCUSS/DSCN MKR DOCD: CPT | Performed by: FAMILY MEDICINE

## 2022-02-09 PROCEDURE — 3017F COLORECTAL CA SCREEN DOC REV: CPT | Performed by: FAMILY MEDICINE

## 2022-02-09 PROCEDURE — 99214 OFFICE O/P EST MOD 30 MIN: CPT | Performed by: FAMILY MEDICINE

## 2022-02-09 PROCEDURE — G8484 FLU IMMUNIZE NO ADMIN: HCPCS | Performed by: FAMILY MEDICINE

## 2022-02-09 PROCEDURE — G8399 PT W/DXA RESULTS DOCUMENT: HCPCS | Performed by: FAMILY MEDICINE

## 2022-02-09 PROCEDURE — 1036F TOBACCO NON-USER: CPT | Performed by: FAMILY MEDICINE

## 2022-02-09 RX ORDER — METOPROLOL SUCCINATE 25 MG/1
12.5 TABLET, EXTENDED RELEASE ORAL SEE ADMIN INSTRUCTIONS
Qty: 45 TABLET | Refills: 3 | Status: SHIPPED | OUTPATIENT
Start: 2022-02-09

## 2022-02-09 RX ORDER — METOPROLOL SUCCINATE 25 MG/1
12.5 TABLET, EXTENDED RELEASE ORAL SEE ADMIN INSTRUCTIONS
Qty: 45 TABLET | Refills: 3 | Status: SHIPPED | OUTPATIENT
Start: 2022-02-09 | End: 2022-02-09 | Stop reason: SDUPTHER

## 2022-02-09 NOTE — PATIENT INSTRUCTIONS
Patient Education        Rotator Cuff: Exercises  Introduction  Here are some examples of exercises for you to try. The exercises may be suggested for a condition or for rehabilitation. Start each exercise slowly. Ease off the exercises if you start to have pain. You will be told when to start these exercises and which ones will work best for you. How to do the exercises  Pendulum swing    If you have pain in your back, do not do this exercise. 1. Hold on to a table or the back of a chair with your good arm. Then bend forward a little and let your sore arm hang straight down. This exercise does not use the arm muscles. Rather, use your legs and your hips to create movement that makes your arm swing freely. 2. Use the movement from your hips and legs to guide the slightly swinging arm back and forth like a pendulum (or elephant trunk). Then guide it in circles that start small (about the size of a dinner plate). Make the circles a bit larger each day, as your pain allows. 3. Do this exercise for 5 minutes, 5 to 7 times each day. 4. As you have less pain, try bending over a little farther to do this exercise. This will increase the amount of movement at your shoulder. Posterior stretching exercise    1. Hold the elbow of your injured arm with your other hand. 2. Use your hand to pull your injured arm gently up and across your body. You will feel a gentle stretch across the back of your injured shoulder. 3. Hold for at least 15 to 30 seconds. Then slowly lower your arm. 4. Repeat 2 to 4 times. Up-the-back stretch    Your doctor or physical therapist may want you to wait to do this stretch until you have regained most of your range of motion and strength. You can do this stretch in different ways. Hold any of these stretches for at least 15 to 30 seconds. Repeat them 2 to 4 times. 1. Light stretch: Put your hand in your back pocket. Let it rest there to stretch your shoulder. 2. Moderate stretch:  With your other hand, hold your injured arm (palm outward) behind your back by the wrist. Pull your arm up gently to stretch your shoulder. 3. Advanced stretch: Put a towel over your other shoulder. Put the hand of your injured arm behind your back. Now hold the back end of the towel. With the other hand, hold the front end of the towel in front of your body. Pull gently on the front end of the towel. This will bring your hand farther up your back to stretch your shoulder. Overhead stretch    1. Standing about an arm's length away, grasp onto a solid surface. You could use a countertop, a doorknob, or the back of a sturdy chair. 2. With your knees slightly bent, bend forward with your arms straight. Lower your upper body, and let your shoulders stretch. 3. As your shoulders are able to stretch farther, you may need to take a step or two backward. 4. Hold for at least 15 to 30 seconds. Then stand up and relax. If you had stepped back during your stretch, step forward so you can keep your hands on the solid surface. 5. Repeat 2 to 4 times. Shoulder flexion (lying down)    To make a wand for this exercise, use a piece of PVC pipe or a broom handle with the broom removed. Make the wand about a foot wider than your shoulders. 1. Lie on your back, holding a wand with both hands. Your palms should face down as you hold the wand. 2. Keeping your elbows straight, slowly raise your arms over your head. Raise them until you feel a stretch in your shoulders, upper back, and chest.  3. Hold for 15 to 30 seconds. 4. Repeat 2 to 4 times. Shoulder rotation (lying down)    To make a wand for this exercise, use a piece of PVC pipe or a broom handle with the broom removed. Make the wand about a foot wider than your shoulders. 1. Lie on your back. Hold a wand with both hands with your elbows bent and palms up. 2. Keep your elbows close to your body, and move the wand across your body toward the sore arm.   3. Hold for 8 to 12 side, with your thumb facing up. Raise your arm 60 degrees at the most (shoulder level is 90 degrees). 2. Hold the position for 3 to 5 seconds. Then lower your arm back to your side. If you need to, bring your \"good\" arm across your body and place it under the elbow as you lower your injured arm. Use your good arm to keep your injured arm from dropping down too fast.  3. Repeat 8 to 12 times. 4. When you first start out, don't hold any extra weight in your hand. As you get stronger, you may use a 1-pound to 2-pound dumbbell or a small can of food. Shoulder flexor and extensor exercise    These are isometric exercises. That means you contract your muscles without actually moving. 1. Push forward (flex): Stand facing a wall or doorjamb, about 6 inches or less back. Hold your injured arm against your body. Make a closed fist with your thumb on top. Then gently push your hand forward into the wall with about 25% to 50% of your strength. Don't let your body move backward as you push. Hold for about 6 seconds. Relax for a few seconds. Repeat 8 to 12 times. 2. Push backward (extend): Stand with your back flat against a wall. Your upper arm should be against the wall, with your elbow bent 90 degrees (your hand straight ahead). Push your elbow gently back against the wall with about 25% to 50% of your strength. Don't let your body move forward as you push. Hold for about 6 seconds. Relax for a few seconds. Repeat 8 to 12 times. Scapular exercise: Wall push-ups    This exercise is best done with your fingers somewhat turned out, rather than straight up and down. 1. Stand facing a wall, about 12 inches to 18 inches away. 2. Place your hands on the wall at shoulder height. 3. Slowly bend your elbows and bring your face to the wall. Keep your back and hips straight. 4. Push back to where you started. 5. Repeat 8 to 12 times.   6. When you can do this exercise against a wall comfortably, you can try it against a counter. You can then slowly progress to the end of a couch, then to a sturdy chair, and finally to the floor. Scapular exercise: Retraction    For this exercise, you will need elastic exercise material, such as surgical tubing or Thera-Band. 1. Put the band around a solid object at about waist level. (A bedpost will work well.) Each hand should hold an end of the band. 2. With your elbows at your sides and bent to 90 degrees, pull the band back. Your shoulder blades should move toward each other. Then move your arms back where you started. 3. Repeat 8 to 12 times. 4. If you have good range of motion in your shoulders, try this exercise with your arms lifted out to the sides. Keep your elbows at a 90-degree angle. Raise the elastic band up to about shoulder level. Pull the band back to move your shoulder blades toward each other. Then move your arms back where you started. Internal rotator strengthening exercise    1. Start by tying a piece of elastic exercise material to a doorknob. You can use surgical tubing or Thera-Band. 2. Stand or sit with your shoulder relaxed and your elbow bent 90 degrees. Your upper arm should rest comfortably against your side. Squeeze a rolled towel between your elbow and your body for comfort. This will help keep your arm at your side. 3. Hold one end of the elastic band in the hand of the painful arm. 4. Slowly rotate your forearm toward your body until it touches your belly. Slowly move it back to where you started. 5. Keep your elbow and upper arm firmly tucked against the towel roll or at your side. 6. Repeat 8 to 12 times. External rotator strengthening exercise    1. Start by tying a piece of elastic exercise material to a doorknob. You can use surgical tubing or Thera-Band. (You may also hold one end of the band in each hand.)  2. Stand or sit with your shoulder relaxed and your elbow bent 90 degrees. Your upper arm should rest comfortably against your side.  Squeeze a rolled towel between your elbow and your body for comfort. This will help keep your arm at your side. 3. Hold one end of the elastic band with the hand of the painful arm. 4. Start with your forearm across your belly. Slowly rotate the forearm out away from your body. Keep your elbow and upper arm tucked against the towel roll or the side of your body until you begin to feel tightness in your shoulder. Slowly move your arm back to where you started. 5. Repeat 8 to 12 times. Follow-up care is a key part of your treatment and safety. Be sure to make and go to all appointments, and call your doctor if you are having problems. It's also a good idea to know your test results and keep a list of the medicines you take. Where can you learn more? Go to https://[employer] Newsletterginaeb.SiteBrand. org and sign in to your [employer] account. Enter Alida Caceres in the [employer] box to learn more about \"Rotator Cuff: Exercises. \"     If you do not have an account, please click on the \"Sign Up Now\" link. Current as of: July 1, 2021               Content Version: 13.1  © 2885-5444 Healthwise, [employer]. Care instructions adapted under license by [facility] [place] ([facility]). If you have questions about a medical condition or this instruction, always ask your healthcare professional. [address] any warranty or liability for your use of this information. Patient Education        Neck: Exercises  Introduction  Here are some examples of exercises for you to try. The exercises may be suggested for a condition or for rehabilitation. Start each exercise slowly. Ease off the exercises if you start to have pain. You will be told when to start these exercises and which ones will work best for you. How to do the exercises  Neck stretch    1. This stretch works best if you keep your shoulder down as you lean away from it.  To help you remember to do this, start by relaxing your shoulders and lightly holding on to your thighs or your chair. 2. Tilt your head toward your shoulder and hold for 15 to 30 seconds. Let the weight of your head stretch your muscles. 3. If you would like a little added stretch, use your hand to gently and steadily pull your head toward your shoulder. For example, keeping your right shoulder down, lean your head to the left. 4. Repeat 2 to 4 times toward each shoulder. Diagonal neck stretch    1. Turn your head slightly toward the direction you will be stretching, and tilt your head diagonally toward your chest and hold for 15 to 30 seconds. 2. If you would like a little added stretch, use your hand to gently and steadily pull your head forward on the diagonal.  3. Repeat 2 to 4 times toward each side. Dorsal glide stretch    The dorsal glide stretches the back of the neck. If you feel pain, do not glide so far back. Some people find this exercise easier to do while lying on their backs with an ice pack on the neck. 1. Sit or stand tall and look straight ahead. 2. Slowly tuck your chin as you glide your head backward over your body  3. Hold for a count of 6, and then relax for up to 10 seconds. 4. Repeat 8 to 12 times. Chest and shoulder stretch    1. Sit or stand tall and glide your head backward as in the dorsal glide stretch. 2. Raise both arms so that your hands are next to your ears. 3. Take a deep breath, and as you breathe out, lower your elbows down and behind your back. You will feel your shoulder blades slide down and together, and at the same time you will feel a stretch across your chest and the front of your shoulders. 4. Hold for about 6 seconds, and then relax for up to 10 seconds. 5. Repeat 8 to 12 times. Strengthening: Hands on head    1. Move your head backward, forward, and side to side against gentle pressure from your hands, holding each position for about 6 seconds. 2. Repeat 8 to 12 times. Follow-up care is a key part of your treatment and safety.  Be sure to make and go to all appointments, and call your doctor if you are having problems. It's also a good idea to know your test results and keep a list of the medicines you take. Where can you learn more? Go to https://[employer] CONSULTINGlars.Asurvest. org and sign in to your [employer] account. Enter P975 in the [employer] box to learn more about \"Neck: Exercises. \"     If you do not have an account, please click on the \"Sign Up Now\" link. Current as of: July 1, 2021               Content Version: 13.1  © 8960-4251 Healthwise, Incorporated. Care instructions adapted under license by [facility] ([facility]). If you have questions about a medical condition or this instruction, always ask your healthcare professional. [address] any warranty or liability for your use of this information.

## 2022-02-10 ENCOUNTER — TELEPHONE (OUTPATIENT)
Dept: FAMILY MEDICINE CLINIC | Age: 67
End: 2022-02-10

## 2022-02-10 NOTE — TELEPHONE ENCOUNTER
LDL elevated  Healthy diet and exercise  Consider trying crestor or we can look into repatha injections every 2 weeks at home  Call patient

## 2022-02-10 NOTE — TELEPHONE ENCOUNTER
Patient aware and voiced understanding, no concerns voiced at this time.    Pt is going to focus on healthy diet and exercise

## 2022-05-18 ENCOUNTER — OFFICE VISIT (OUTPATIENT)
Dept: FAMILY MEDICINE CLINIC | Age: 67
End: 2022-05-18
Payer: MEDICARE

## 2022-05-18 ENCOUNTER — HOSPITAL ENCOUNTER (OUTPATIENT)
Dept: WOMENS IMAGING | Age: 67
Discharge: HOME | End: 2022-05-18
Payer: MEDICARE

## 2022-05-18 VITALS
OXYGEN SATURATION: 98 % | SYSTOLIC BLOOD PRESSURE: 110 MMHG | BODY MASS INDEX: 22.86 KG/M2 | DIASTOLIC BLOOD PRESSURE: 68 MMHG | RESPIRATION RATE: 16 BRPM | WEIGHT: 125 LBS | HEART RATE: 88 BPM | TEMPERATURE: 97 F

## 2022-05-18 DIAGNOSIS — M48.02 CERVICAL STENOSIS OF SPINE: Primary | ICD-10-CM

## 2022-05-18 DIAGNOSIS — Z12.31 VISIT FOR SCREENING MAMMOGRAM: ICD-10-CM

## 2022-05-18 PROCEDURE — 1090F PRES/ABSN URINE INCON ASSESS: CPT | Performed by: NURSE PRACTITIONER

## 2022-05-18 PROCEDURE — 3017F COLORECTAL CA SCREEN DOC REV: CPT | Performed by: NURSE PRACTITIONER

## 2022-05-18 PROCEDURE — G8427 DOCREV CUR MEDS BY ELIG CLIN: HCPCS | Performed by: NURSE PRACTITIONER

## 2022-05-18 PROCEDURE — G8420 CALC BMI NORM PARAMETERS: HCPCS | Performed by: NURSE PRACTITIONER

## 2022-05-18 PROCEDURE — 1036F TOBACCO NON-USER: CPT | Performed by: NURSE PRACTITIONER

## 2022-05-18 PROCEDURE — 77063 BREAST TOMOSYNTHESIS BI: CPT

## 2022-05-18 PROCEDURE — 4040F PNEUMOC VAC/ADMIN/RCVD: CPT | Performed by: NURSE PRACTITIONER

## 2022-05-18 PROCEDURE — 1123F ACP DISCUSS/DSCN MKR DOCD: CPT | Performed by: NURSE PRACTITIONER

## 2022-05-18 PROCEDURE — 99213 OFFICE O/P EST LOW 20 MIN: CPT | Performed by: NURSE PRACTITIONER

## 2022-05-18 PROCEDURE — G8399 PT W/DXA RESULTS DOCUMENT: HCPCS | Performed by: NURSE PRACTITIONER

## 2022-05-18 ASSESSMENT — ENCOUNTER SYMPTOMS
GASTROINTESTINAL NEGATIVE: 1
RESPIRATORY NEGATIVE: 1
EYES NEGATIVE: 1

## 2022-05-18 NOTE — PROGRESS NOTES
Ria Castro is a 74 y.o. female whopresents today for :  Chief Complaint   Patient presents with    Neck Pain       HPI:     HPI  Pt here with cont neck pain. This has been an issue for many months. Since last fall. Had numbness in left arm that has improved but stiffness in neck. Occasionally has trouble swallowing. Gets shooting pain in back of neck but also pain in anterior neck.        Patient Active Problem List   Diagnosis    Dyshidrosis    Hyperlipidemia    Seasonal allergies    Incisional hernia    Osteoarthritis    Gastroesophageal reflux disease without esophagitis    Essential hypertension    MVP (mitral valve prolapse)    COVID    Paroxysmal atrial fibrillation ([address]        Past Medical History:   Diagnosis Date    Allergic rhinitis     Bulging discs 2013    [facility]    Chickenpox     Essential hypertension 9/13/2017    Gastroesophageal reflux disease without esophagitis 5/23/2016    History of kidney stones     Hyperlipidemia     MVP (mitral valve prolapse)     Osteoarthritis     Pneumonia 1992    Recurrent UTI       Past Surgical History:   Procedure Laterality Date    APPENDECTOMY  1996    CARDIOVASCULAR STRESS TEST  08/2018    COLONOSCOPY  08/2018    Polyps removed    DILATION AND CURETTAGE OF UTERUS      2-3 times    HERNIA REPAIR  2005    left inguinal    HYSTERECTOMY  1996    total for endometriosis    JOINT REPLACEMENT Left 09/13/2021    Corey Espinal Razor      open and scopes several    OTHER SURGICAL HISTORY      Titinum plates in back of c spine    OVARY REMOVAL  1996    total    PATELLAR TENDON REPAIR  1971    DE BIOPSY OF BREAST, INCISIONAL Left 1988    excisional bx, benign    WISDOM TOOTH EXTRACTION       Family History   Problem Relation Age of Onset    Heart Disease Mother 61    Thyroid Disease Mother     Heart Disease Father 61    Kidney Disease Father     Cancer Maternal Grandfather 80        skin cancer    Glaucoma Maternal Grandfather     Stroke Paternal Grandmother 68    Rheum Arthritis Paternal Grandmother     Heart Disease Maternal Grandmother     Other Maternal Grandmother         Stomach Issues    Alzheimer's Disease Paternal Grandfather     Diabetes Sister         x2    High Blood Pressure Brother     High Cholesterol Brother     Diabetes Brother     Alzheimer's Disease Paternal Uncle      Social History     Tobacco Use    Smoking status: Never Smoker    Smokeless tobacco: Never Used   Substance Use Topics    Alcohol use: Yes     Alcohol/week: 0.0 standard drinks     Comment: socially      Current Outpatient Medications   Medication Sig Dispense Refill    metoprolol succinate (TOPROL XL) 25 MG extended release tablet Take 0.5 tablets by mouth See Admin Instructions PT reports taking 3 x's a week* 45 tablet 3    ELIQUIS 2.5 MG TABS tablet TAKE 1 TABLET TWICE A  tablet 3    omeprazole (PRILOSEC) 40 MG delayed release capsule TAKE 1 CAPSULE DAILY 90 capsule 3    montelukast (SINGULAIR) 10 MG tablet Take 10 mg by mouth nightly      fluticasone (FLONASE) 50 MCG/ACT nasal spray USE 2 SPRAYS IN EACH NOSTRIL ONCE DAILY (Patient taking differently: 1 spray ) 48 g 11    calcium carbonate (OSCAL) 500 MG TABS tablet Take 500 mg by mouth daily Patient is taking 3 times weekly      cetirizine (ZYRTEC) 5 MG tablet Take 5 mg by mouth daily       magnesium 30 MG tablet Take 30 mg by mouth three times a week      Acetaminophen (TYLENOL EXTRA STRENGTH PO) Take by mouth 2 times daily as needed       famotidine (PEPCID AC MAXIMUM STRENGTH) 20 MG tablet Take 20 mg by mouth daily       therapeutic multivitamin-minerals (THERAGRAN-M) tablet Take 1 tablet by mouth daily. No current facility-administered medications for this visit.      Allergies   Allergen Reactions    Latex Rash    Citalopram      Memory loss    Codeine Other (See Comments)     restless    Lipitor [Atorvastatin]      Muscle cramps     Health Maintenance   Topic Date Due    Shingles vaccine (1 of 2) 06/22/2015    Annual Wellness Visit (AWV)  07/17/2020    Pneumococcal 65+ years Vaccine (2 - PPSV23 or PCV20) 09/21/2021    Depression Screen  06/04/2022    Breast cancer screen  01/28/2023    Lipids  02/09/2027    Colorectal Cancer Screen  08/23/2028    DTaP/Tdap/Td vaccine (2 - Td or Tdap) 01/21/2030    DEXA (modify frequency per FRAX score)  Completed    Flu vaccine  Completed    COVID-19 Vaccine  Completed    Hepatitis C screen  Completed    Hepatitis A vaccine  Aged Out    Hepatitis B vaccine  Aged Out    Hib vaccine  Aged Out    Meningococcal (ACWY) vaccine  Aged Out       Subjective:     Review of Systems   Constitutional: Negative. HENT: Negative. Eyes: Negative. Respiratory: Negative. Cardiovascular: Negative. Gastrointestinal: Negative. Musculoskeletal: Positive for neck pain. Skin: Negative. Neurological: Negative. Objective:     Vitals:    05/18/22 1122   BP: 110/68   Site: Right Upper Arm   Position: Sitting   Cuff Size: Large Adult   Pulse: 88   Resp: 16   Temp: 97 °F (36.1 °C)   TempSrc: Temporal   SpO2: 98%   Weight: 125 lb (56.7 kg)       Physical Exam  Musculoskeletal:        Back:            Assessment:      Diagnosis Orders   1. Cervical stenosis of spine  MRI CERVICAL SPINE WO CONTRAST       Plan:      No follow-ups on file. I suspect her symptoms are an exacerbation of her known cervical stenosis. Been 7 years since her 4-7 fusion. Will get updated mri  Orders Placed This Encounter   Procedures    MRI CERVICAL SPINE WO CONTRAST     Standing Status:   Future     Standing Expiration Date:   5/18/2023     No orders of the defined types were placed in this encounter. Patient given educational materials - seepatient instructions. Discussed use, benefit, and side effects of prescribed medications. All patient questions answered. Pt voiced understanding.   Patient agreed withtreatment plan. Follow up as directed.      Electronically signed by Court FERMIN Montenegro CNP on 5/18/2022 at 3:50 PM

## 2022-06-15 ENCOUNTER — HOSPITAL ENCOUNTER (OUTPATIENT)
Dept: MRI IMAGING | Age: 67
Discharge: HOME | End: 2022-06-15
Payer: MEDICARE

## 2022-06-15 DIAGNOSIS — M48.02 CERVICAL STENOSIS OF SPINE: ICD-10-CM

## 2022-06-15 PROCEDURE — 72141 MRI NECK SPINE W/O DYE: CPT

## 2022-07-28 DIAGNOSIS — K21.9 GASTROESOPHAGEAL REFLUX DISEASE WITHOUT ESOPHAGITIS: ICD-10-CM

## 2022-07-29 RX ORDER — OMEPRAZOLE 40 MG/1
CAPSULE, DELAYED RELEASE ORAL
Qty: 90 CAPSULE | Refills: 3 | Status: SHIPPED | OUTPATIENT
Start: 2022-07-29

## 2022-08-24 ENCOUNTER — OFFICE VISIT (OUTPATIENT)
Dept: FAMILY MEDICINE CLINIC | Age: 67
End: 2022-08-24
Payer: MEDICARE

## 2022-08-24 VITALS
SYSTOLIC BLOOD PRESSURE: 118 MMHG | BODY MASS INDEX: 23.19 KG/M2 | DIASTOLIC BLOOD PRESSURE: 64 MMHG | HEIGHT: 62 IN | TEMPERATURE: 97.1 F | OXYGEN SATURATION: 100 % | HEART RATE: 81 BPM | RESPIRATION RATE: 16 BRPM | WEIGHT: 126 LBS

## 2022-08-24 DIAGNOSIS — S83.512A RUPTURE OF ANTERIOR CRUCIATE LIGAMENT OF LEFT KNEE, INITIAL ENCOUNTER: ICD-10-CM

## 2022-08-24 DIAGNOSIS — S83.242A ACUTE MEDIAL MENISCUS TEAR OF LEFT KNEE, INITIAL ENCOUNTER: ICD-10-CM

## 2022-08-24 DIAGNOSIS — M85.80 OSTEOPENIA DETERMINED BY X-RAY: ICD-10-CM

## 2022-08-24 DIAGNOSIS — M15.9 PRIMARY OSTEOARTHRITIS INVOLVING MULTIPLE JOINTS: ICD-10-CM

## 2022-08-24 DIAGNOSIS — L30.1 DYSHIDROSIS: ICD-10-CM

## 2022-08-24 DIAGNOSIS — Z00.00 MEDICARE ANNUAL WELLNESS VISIT, SUBSEQUENT: Primary | ICD-10-CM

## 2022-08-24 DIAGNOSIS — J30.2 SEASONAL ALLERGIES: ICD-10-CM

## 2022-08-24 DIAGNOSIS — M48.02 CERVICAL STENOSIS OF SPINE: ICD-10-CM

## 2022-08-24 DIAGNOSIS — S83.282A ACUTE LATERAL MENISCUS TEAR OF LEFT KNEE, INITIAL ENCOUNTER: ICD-10-CM

## 2022-08-24 DIAGNOSIS — I48.0 PAROXYSMAL ATRIAL FIBRILLATION (HCC): ICD-10-CM

## 2022-08-24 DIAGNOSIS — K59.09 OTHER CONSTIPATION: ICD-10-CM

## 2022-08-24 DIAGNOSIS — M25.512 ACUTE PAIN OF BOTH SHOULDERS: ICD-10-CM

## 2022-08-24 DIAGNOSIS — K43.2 INCISIONAL HERNIA, WITHOUT OBSTRUCTION OR GANGRENE: ICD-10-CM

## 2022-08-24 DIAGNOSIS — E78.00 PURE HYPERCHOLESTEROLEMIA: ICD-10-CM

## 2022-08-24 DIAGNOSIS — M17.12 ARTHRITIS OF LEFT KNEE: ICD-10-CM

## 2022-08-24 DIAGNOSIS — K21.9 GASTROESOPHAGEAL REFLUX DISEASE WITHOUT ESOPHAGITIS: ICD-10-CM

## 2022-08-24 DIAGNOSIS — I34.1 MVP (MITRAL VALVE PROLAPSE): ICD-10-CM

## 2022-08-24 DIAGNOSIS — M25.511 ACUTE PAIN OF BOTH SHOULDERS: ICD-10-CM

## 2022-08-24 DIAGNOSIS — I10 ESSENTIAL HYPERTENSION: ICD-10-CM

## 2022-08-24 PROCEDURE — G0439 PPPS, SUBSEQ VISIT: HCPCS | Performed by: FAMILY MEDICINE

## 2022-08-24 PROCEDURE — 1123F ACP DISCUSS/DSCN MKR DOCD: CPT | Performed by: FAMILY MEDICINE

## 2022-08-24 PROCEDURE — 3017F COLORECTAL CA SCREEN DOC REV: CPT | Performed by: FAMILY MEDICINE

## 2022-08-24 RX ORDER — PITAVASTATIN CALCIUM 2.09 MG/1
2 TABLET, FILM COATED ORAL NIGHTLY
Qty: 90 TABLET | Refills: 1 | Status: SHIPPED | OUTPATIENT
Start: 2022-08-24

## 2022-08-24 SDOH — ECONOMIC STABILITY: FOOD INSECURITY: WITHIN THE PAST 12 MONTHS, THE FOOD YOU BOUGHT JUST DIDN'T LAST AND YOU DIDN'T HAVE MONEY TO GET MORE.: NEVER TRUE

## 2022-08-24 SDOH — ECONOMIC STABILITY: FOOD INSECURITY: WITHIN THE PAST 12 MONTHS, YOU WORRIED THAT YOUR FOOD WOULD RUN OUT BEFORE YOU GOT MONEY TO BUY MORE.: NEVER TRUE

## 2022-08-24 ASSESSMENT — LIFESTYLE VARIABLES
HAVE YOU OR SOMEONE ELSE BEEN INJURED AS A RESULT OF YOUR DRINKING: 0
HOW OFTEN DURING THE LAST YEAR HAVE YOU FOUND THAT YOU WERE NOT ABLE TO STOP DRINKING ONCE YOU HAD STARTED: 0
HOW OFTEN DURING THE LAST YEAR HAVE YOU FAILED TO DO WHAT WAS NORMALLY EXPECTED FROM YOU BECAUSE OF DRINKING: 0
HOW OFTEN DURING THE LAST YEAR HAVE YOU HAD A FEELING OF GUILT OR REMORSE AFTER DRINKING: 0
HOW OFTEN DO YOU HAVE A DRINK CONTAINING ALCOHOL: 2-4 TIMES A MONTH
HOW OFTEN DURING THE LAST YEAR HAVE YOU NEEDED AN ALCOHOLIC DRINK FIRST THING IN THE MORNING TO GET YOURSELF GOING AFTER A NIGHT OF HEAVY DRINKING: 0
HOW MANY STANDARD DRINKS CONTAINING ALCOHOL DO YOU HAVE ON A TYPICAL DAY: 3 OR 4
HOW OFTEN DURING THE LAST YEAR HAVE YOU BEEN UNABLE TO REMEMBER WHAT HAPPENED THE NIGHT BEFORE BECAUSE YOU HAD BEEN DRINKING: 0
HAS A RELATIVE, FRIEND, DOCTOR, OR ANOTHER HEALTH PROFESSIONAL EXPRESSED CONCERN ABOUT YOUR DRINKING OR SUGGESTED YOU CUT DOWN: 0

## 2022-08-24 ASSESSMENT — PATIENT HEALTH QUESTIONNAIRE - PHQ9
SUM OF ALL RESPONSES TO PHQ9 QUESTIONS 1 & 2: 0
2. FEELING DOWN, DEPRESSED OR HOPELESS: 0
SUM OF ALL RESPONSES TO PHQ QUESTIONS 1-9: 0
SUM OF ALL RESPONSES TO PHQ QUESTIONS 1-9: 0
1. LITTLE INTEREST OR PLEASURE IN DOING THINGS: 0
SUM OF ALL RESPONSES TO PHQ QUESTIONS 1-9: 0
SUM OF ALL RESPONSES TO PHQ QUESTIONS 1-9: 0

## 2022-08-24 ASSESSMENT — SOCIAL DETERMINANTS OF HEALTH (SDOH): HOW HARD IS IT FOR YOU TO PAY FOR THE VERY BASICS LIKE FOOD, HOUSING, MEDICAL CARE, AND HEATING?: NOT HARD AT ALL

## 2022-08-24 NOTE — PATIENT INSTRUCTIONS
Personalized Preventive Plan for Lucita Salazar - 8/24/2022  Medicare offers a range of preventive health benefits. Some of the tests and screenings are paid in full while other may be subject to a deductible, co-insurance, and/or copay. Some of these benefits include a comprehensive review of your medical history including lifestyle, illnesses that may run in your family, and various assessments and screenings as appropriate. After reviewing your medical record and screening and assessments performed today your provider may have ordered immunizations, labs, imaging, and/or referrals for you. A list of these orders (if applicable) as well as your Preventive Care list are included within your After Visit Summary for your review. Other Preventive Recommendations:    A preventive eye exam performed by an eye specialist is recommended every 1-2 years to screen for glaucoma; cataracts, macular degeneration, and other eye disorders. A preventive dental visit is recommended every 6 months. Try to get at least 150 minutes of exercise per week or 10,000 steps per day on a pedometer . Order or download the FREE \"Exercise & Physical Activity: Your Everyday Guide\" from [employer]. Call 1-157.732.8598 or search [employer] online. You need 3941-9998 mg of calcium and 5010-7201 IU of vitamin D per day. It is possible to meet your calcium requirement with diet alone, but a vitamin D supplement is usually necessary to meet this goal.  When exposed to the sun, use a sunscreen that protects against both UVA and UVB radiation with an SPF of 30 or greater. Reapply every 2 to 3 hours or after sweating, drying off with a towel, or swimming. Always wear a seat belt when traveling in a car. Always wear a helmet when riding a bicycle or motorcycle.

## 2022-08-24 NOTE — PROGRESS NOTES
[address] [place] [address] Thomas Giang  Dept: [phone]  Dept Fax: [phone]  Loc: Rufino Bruce is a 69 y.o. female who presents today for:  No chief complaint on file. HPI:     HPI    Here for pain in both shoulders and neck for the past month. She had her left knee replaced 9/13/21. The mornings are the worst for the shoulder pain as well as the knees/legs. Arm pain in the upper arms and neck. Tried:  Tylenol prn was not helping much.  mobic did help but she is on eliquis and started with nose bleeds and had to stop the mobic. Massage at home helps. Left knee is doing well. The neck and shoulders are slowly getting better which is a little better on celebrex. Left hand now has tingling and numbness in the 4th and 5th fingers. She originally thought it was due to using the cane and walker a lot but the left fingers are worse. ***        Hypertension is controlled on current medications. Afib under the management of cardiology. She reports the toprol was stopped but the cardiologist note reports that he was going to continue the beta blocker. Hyperlipidemia: Patient presents with hyperlipidemia. She was tested because family history.   Her last labs showed   Lab Results   Component Value Date    CHOL 257 (H) 02/09/2022    CHOL 190 02/02/2021    CHOL 158 11/18/2019     Lab Results   Component Value Date    TRIG 113 02/09/2022    TRIG 107 02/02/2021    TRIG 80 11/18/2019     Lab Results   Component Value Date    HDL 70 02/09/2022    HDL 62 02/02/2021    HDL 62 11/18/2019     Lab Results   Component Value Date    LDLCALC 164 02/09/2022    LDLCALC 107 02/02/2021    LDLCALC 80 11/18/2019     Lab Results   Component Value Date    VLDL 20 05/11/2019    VLDL 23 05/14/2016    VLDL 21 10/10/2015     Lab Results   Component Value Date    CHOLHDLRATIO 2.2 05/11/2019    CHOLHDLRATIO 4.0 10/05/2013     There is a family history of hyperlipidemia. There is a family history of early ischemia heart disease. Allergic Rhinitis: Teddy Alicea is here for evaluation of possible allergic rhinitis. Patient's symptoms include nasal congestion, postnasal drip and sinus pressure. These symptoms are seasonal. Current triggers include exposure to pollens. The patient has been suffering from these symptoms for approximately 3 months. The patient has tried over the counter medications with fair relief of symptoms. Immunotherapy has never been tried. The patient has never had nasal polyps. The patient has no history of asthma. The patient does not suffer from frequent sinopulmonary infections. The patient has not had sinus surgery in the past. The patient has no history of eczema. However, she is receiving allergy shots to help as the allergies get worse. This is helping so far this year. ***    Arthritis is controlled with NSAIDs prn.  ***    GERD is controlled on PPI but she also has a fair amount of constipation leading to nausea and difficulty getting a urinary stream started. As long as she take stool softeners to keep a regular BM then the bladder works more efficiently. ***    MVP last checked with echo in 8-2018. Having occasional heart flutter but follows with cardiology in august 2022. ***      Reviewed chart forpast medical history , surgical history , allergies, social history , family history and medications.     Health Maintenance   Topic Date Due    Shingles vaccine (1 of 2) 06/22/2015    Pneumococcal 65+ years Vaccine (2 - PPSV23 or PCV20) 09/21/2021    COVID-19 Vaccine (4 - Booster for Pfizer series) 03/10/2022    Depression Screen  06/04/2022    Flu vaccine (1) 09/01/2022    Breast cancer screen  05/18/2024    Lipids  02/09/2027    Colorectal Cancer Screen  08/23/2028    DTaP/Tdap/Td vaccine (2 - Td or Tdap) 01/21/2030    DEXA (modify frequency per FRAX score)  Completed    Hepatitis C screen  Completed    Hepatitis A vaccine  Aged Out    Hepatitis B vaccine  Aged Out    Hib vaccine  Aged Out    Meningococcal (ACWY) vaccine  Aged Out       Subjective:      Constitutional:Negative for fever, chills, diaphoresis, activity change, appetite change and fatigue. HENT: Negative for hearing loss, ear pain, congestion, sore throat, rhinorrhea, postnasal drip and ear discharge. Eyes: Negative for photophobia and visual disturbance. Respiratory: Negative for cough, chest tightness, shortness of breath and wheezing. Cardiovascular: Negative for chest pain and leg swelling. Gastrointestinal: Negative for nausea, vomiting, abdominal pain, diarrhea and constipation. Genitourinary: Negative for dysuria, urgency and frequency. Neurological: Negative for weakness, light-headedness and headaches. Psychiatric/Behavioral: Negative for sleep disturbance.      :     There were no vitals filed for this visit.   Wt Readings from Last 3 Encounters:   06/15/22 127 lb (57.6 kg)   05/18/22 125 lb (56.7 kg)   02/09/22 122 lb 6.4 oz (55.5 kg)       Physical Exam        Assessment/Plan   Diagnoses and all orders for this visit:    Essential hypertension    Pure hypercholesterolemia    Seasonal allergies    Gastroesophageal reflux disease without esophagitis    Paroxysmal atrial fibrillation (HCC)    Cervical stenosis of spine    Osteopenia determined by x-ray    MVP (mitral valve prolapse)    Primary osteoarthritis involving multiple joints    Other constipation    Dyshidrosis    Incisional hernia, without obstruction or gangrene    Acute pain of both shoulders    Acute lateral meniscus tear of left knee, initial encounter    Rupture of anterior cruciate ligament of left knee, initial encounter    Acute medial meniscus tear of left knee, initial encounter    Arthritis of left knee          Reccommended tobacco cessation options including pharmacologicmethods, counseled great than 3 minutes during this visit: Yes  []  No  []    Patient given educational materials - see patient instructions. Discussed use, benefit, and side effectsof prescribed medications. All patient questions answered. Pt voiced understanding. Reviewed health maintenance. Instructed to continue current medications, diet and exercise. Patient agreed with treatment plan. Followup as directed.      Electronically signed by Donn Bradley MD

## 2022-08-24 NOTE — PROGRESS NOTES
Medicare Annual Wellness Visit    Leonel Corral is here for Medicare AWV    Assessment & Plan   Medicare annual wellness visit, subsequent  Essential hypertension  Pure hypercholesterolemia  Seasonal allergies  Gastroesophageal reflux disease without esophagitis  Paroxysmal atrial fibrillation (HCC)  Cervical stenosis of spine  Osteopenia determined by x-ray  MVP (mitral valve prolapse)  Primary osteoarthritis involving multiple joints  Other constipation  Dyshidrosis  Incisional hernia, without obstruction or gangrene  Acute pain of both shoulders  Acute lateral meniscus tear of left knee, initial encounter  Rupture of anterior cruciate ligament of left knee, initial encounter  Acute medial meniscus tear of left knee, initial encounter  Arthritis of left knee    Recommendations for Preventive Services Due: see orders and patient instructions/AVS.  Recommended screening schedule for the next 5-10 years is provided to the patient in written form: see Patient Instructions/AVS.     Return in 6 months (on 2/24/2023) for Medicare Annual Wellness Visit in 1 year. Subjective     Here for pain in both shoulders and neck for the past month. She had her left knee replaced 9/13/21. The mornings are the worst for the shoulder pain as well as the knees/legs. Arm pain in the upper arms and neck. Tried:  Tylenol prn was not helping much.  mobic did help but she is on eliquis and started with nose bleeds and had to stop the mobic. Massage at home helps. Left knee is doing well. The neck and shoulders are slowly getting better which is a little better on celebrex. Left hand now has tingling and numbness in the 4th and 5th fingers. She originally thought it was due to using the cane and walker a lot but the left fingers are worse. Better now that she is not using the cane        Hypertension is controlled on current medications. Afib under the management of cardiology.   She reports the toprol was stopped but the cardiologist note reports that he was going to continue the beta blocker. Hyperlipidemia: Patient presents with hyperlipidemia. She was tested because family history. Her last labs showed   Lab Results   Component Value Date    CHOL 257 (H) 02/09/2022    CHOL 190 02/02/2021    CHOL 158 11/18/2019     Lab Results   Component Value Date    TRIG 113 02/09/2022    TRIG 107 02/02/2021    TRIG 80 11/18/2019     Lab Results   Component Value Date    HDL 70 02/09/2022    HDL 62 02/02/2021    HDL 62 11/18/2019     Lab Results   Component Value Date    LDLCALC 164 02/09/2022    LDLCALC 107 02/02/2021    LDLCALC 80 11/18/2019     Lab Results   Component Value Date    VLDL 20 05/11/2019    VLDL 23 05/14/2016    VLDL 21 10/10/2015     Lab Results   Component Value Date    CHOLHDLRATIO 2.2 05/11/2019    CHOLHDLRATIO 4.0 10/05/2013     There is a family history of hyperlipidemia. There is a family history of early ischemia heart disease. Allergic Rhinitis: Mika Kaufman is here for evaluation of possible allergic rhinitis. Patient's symptoms include nasal congestion, postnasal drip and sinus pressure. These symptoms are seasonal. Current triggers include exposure to pollens. The patient has been suffering from these symptoms for approximately 3 months. The patient has tried over the counter medications with fair relief of symptoms. Immunotherapy has never been tried. The patient has never had nasal polyps. The patient has no history of asthma. The patient does not suffer from frequent sinopulmonary infections. The patient has not had sinus surgery in the past. The patient has no history of eczema. However, she is receiving allergy shots to help as the allergies get worse. This is helping so far this year. Arthritis is controlled with NSAIDs prn. GERD is controlled on PPI but she also has a fair amount of constipation leading to nausea and difficulty getting a urinary stream started.   As long as she take stool softeners to keep a regular BM then the bladder works more efficiently. MVP last checked with echo in 8-2018. Having occasional heart flutter but follows with cardiology in august 2022. The following acute and/or chronic problems were also addressed today:  See above    Patient's complete Health Risk Assessment and screening values have been reviewed and are found in Flowsheets. The following problems were reviewed today and where indicated follow up appointments were made and/or referrals ordered. Positive Risk Factor Screenings with Interventions:             General Health and ACP:  General  In general, how would you say your health is?: Very Good  In the past 7 days, have you experienced any of the following: New or Increased Pain, New or Increased Fatigue, Loneliness, Social Isolation, Stress or Anger?: No  Do you get the social and emotional support that you need?: Yes  Do you have a Living Will?: (!) No    Advance Directives       Power of  Living Will ACP-Advance Directive ACP-Power of     Not on File Not on File Not on File Not on File        General Health Risk Interventions:  No Living Will: Advance Care Planning addressed with patient today              Objective   Vitals:    08/24/22 1306   BP: 118/64   Site: Left Upper Arm   Position: Sitting   Cuff Size: Large Adult   Pulse: 81   Resp: 16   Temp: 97.1 °F (36.2 °C)   TempSrc: Temporal   SpO2: 100%   Weight: 126 lb (57.2 kg)   Height: 5' 2\" (1.575 m)      Body mass index is 23.05 kg/m². Physical Exam   Constitutional: Vital signs are normal. She appears well-developed and well-nourished. She is active. HENT:   Head: Normocephalic and atraumatic. Right Ear: Tympanic membrane, external ear and ear canal normal. No drainage or tenderness. Left Ear: Tympanic membrane, external ear and ear canal normal. No drainage or tenderness. Nose: Nose normal. No mucosal edema or rhinorrhea.    Mouth/Throat: Uvula is midline, oropharynx is clear and moist and mucous membranes are normal. Mucous membranes are not pale. Normal dentition. No posterior oropharyngeal edema or posterior oropharyngeal erythema. Eyes: Lids are normal. Right eye exhibits no chemosis and no discharge. Left eye exhibits no chemosis and no drainage. Right conjunctiva has no hemorrhage. Left conjunctiva has no hemorrhage. Right eye exhibits normal extraocular motion. Left eye exhibits normal extraocular motion. Right pupil is round and reactive. Left pupil is round and reactive. Pupils are equal.   Cardiovascular: Normal rate, regular rhythm, S1 normal, S2 normal and normal heart sounds. Exam reveals no gallop. No murmur heard. Pulmonary/Chest: Effort normal and breath sounds normal. No respiratory distress. She has no wheezes. She has no rhonchi. She has no rales. Abdominal: Soft. Normal appearance and bowel sounds are normal. She exhibits no distension and no mass. There is no hepatosplenomegaly. No tenderness. She has no rigidity, no rebound and no guarding. No hernia. Musculoskeletal:        Right lower leg: She exhibits no edema. Left lower leg: She exhibits no edema. Neurological: She is alert. Allergies   Allergen Reactions    Latex Rash    Citalopram      Memory loss    Codeine Other (See Comments)     restless    Lipitor [Atorvastatin]      Muscle cramps     Prior to Visit Medications    Medication Sig Taking?  Authorizing Provider   pitavastatin (LIVALO) 2 MG TABS tablet Take 1 tablet by mouth nightly Yes Danay Keith MD   omeprazole (PRILOSEC) 40 MG delayed release capsule TAKE 1 CAPSULE DAILY Yes Danay Keith MD   metoprolol succinate (TOPROL XL) 25 MG extended release tablet Take 0.5 tablets by mouth See Admin Instructions PT reports taking 3 x's a week* Yes Danay Keith MD   ELIQUIS 2.5 MG TABS tablet TAKE 1 TABLET TWICE A DAY Yes Danay Keith MD   montelukast (SINGULAIR) 10 MG tablet Take 10 mg by mouth nightly Yes Historical Provider, MD   fluticasone (FLONASE) 50 MCG/ACT nasal spray USE 2 SPRAYS IN EACH NOSTRIL ONCE DAILY  Patient taking differently: 1 spray Yes Ariadna Zamarripa MD   calcium carbonate (OSCAL) 500 MG TABS tablet Take 500 mg by mouth daily Patient is taking 3 times weekly Yes Historical Provider, MD   cetirizine (ZYRTEC) 5 MG tablet Take 5 mg by mouth daily  Yes Historical Provider, MD   magnesium 30 MG tablet Take 30 mg by mouth three times a week Yes Historical Provider, MD   Acetaminophen (TYLENOL EXTRA STRENGTH PO) Take by mouth 2 times daily as needed  Yes Historical Provider, MD   famotidine (PEPCID) 20 MG tablet Take 20 mg by mouth daily  Yes Historical Provider, MD   therapeutic multivitamin-minerals (THERAGRAN-M) tablet Take 1 tablet by mouth daily.    Yes Historical Provider, MD Figueroa (Including outside providers/suppliers regularly involved in providing care):   Patient Care Team:  Ariadna Zamarripa MD as PCP - General (Family Medicine)  Ariadna Zamarripa MD as PCP - [facility] Empaneled Provider  Jerry Mcwilliams MD as Cardiologist (Cardiology)     Reviewed and updated this visit:  Tobacco  Allergies  Meds  Med Hx  Surg Hx  Soc Hx  Fam Hx

## 2022-08-31 ENCOUNTER — OFFICE VISIT (OUTPATIENT)
Dept: CARDIOLOGY CLINIC | Age: 67
End: 2022-08-31
Payer: MEDICARE

## 2022-08-31 VITALS
DIASTOLIC BLOOD PRESSURE: 78 MMHG | HEART RATE: 75 BPM | BODY MASS INDEX: 23.23 KG/M2 | SYSTOLIC BLOOD PRESSURE: 136 MMHG | WEIGHT: 127 LBS

## 2022-08-31 DIAGNOSIS — I48.0 PAROXYSMAL ATRIAL FIBRILLATION (HCC): ICD-10-CM

## 2022-08-31 DIAGNOSIS — I48.91 ATRIAL FIBRILLATION, UNSPECIFIED TYPE (HCC): Primary | ICD-10-CM

## 2022-08-31 PROCEDURE — G8399 PT W/DXA RESULTS DOCUMENT: HCPCS | Performed by: INTERNAL MEDICINE

## 2022-08-31 PROCEDURE — 99214 OFFICE O/P EST MOD 30 MIN: CPT | Performed by: INTERNAL MEDICINE

## 2022-08-31 PROCEDURE — 1090F PRES/ABSN URINE INCON ASSESS: CPT | Performed by: INTERNAL MEDICINE

## 2022-08-31 PROCEDURE — 3017F COLORECTAL CA SCREEN DOC REV: CPT | Performed by: INTERNAL MEDICINE

## 2022-08-31 PROCEDURE — 93000 ELECTROCARDIOGRAM COMPLETE: CPT | Performed by: INTERNAL MEDICINE

## 2022-08-31 PROCEDURE — G8420 CALC BMI NORM PARAMETERS: HCPCS | Performed by: INTERNAL MEDICINE

## 2022-08-31 PROCEDURE — 1123F ACP DISCUSS/DSCN MKR DOCD: CPT | Performed by: INTERNAL MEDICINE

## 2022-08-31 PROCEDURE — 1036F TOBACCO NON-USER: CPT | Performed by: INTERNAL MEDICINE

## 2022-08-31 PROCEDURE — G8428 CUR MEDS NOT DOCUMENT: HCPCS | Performed by: INTERNAL MEDICINE

## 2022-08-31 NOTE — PROGRESS NOTES
COVID-19, Essential hypertension, Gastroesophageal reflux disease without esophagitis, History of kidney stones, Hyperlipidemia, MVP (mitral valve prolapse), Osteoarthritis, Pneumonia, and Recurrent UTI. Social History  Neo Munguia  reports that she has never smoked. She has never used smokeless tobacco. She reports current alcohol use. She reports that she does not use drugs. Family History  Neo Munguia family history includes Alzheimer's Disease in her paternal grandfather and paternal uncle; Cancer (age of onset: 80) in her maternal grandfather; Diabetes in her brother and sister; Glaucoma in her maternal grandfather; Heart Disease in her maternal grandmother; Heart Disease (age of onset: 61) in her father and mother; High Blood Pressure in her brother; High Cholesterol in her brother; Kidney Disease in her father; Other in her maternal grandmother; Rheum Arthritis in her paternal grandmother; Stroke (age of onset: 68) in her paternal grandmother; Thyroid Disease in her mother. Past Surgical History   Past Surgical History:   Procedure Laterality Date    APPENDECTOMY  1996    CARDIOVASCULAR STRESS TEST  08/2018    COLONOSCOPY  08/2018    Polyps removed    DILATION AND CURETTAGE OF UTERUS      2-3 times    HERNIA REPAIR  2005    left inguinal    HYSTERECTOMY (CERVIX STATUS UNKNOWN)  1996    total for endometriosis    JOINT REPLACEMENT Left 09/13/2021    [facility] Dr. Duran Handler      open and scopes several    OTHER SURGICAL HISTORY      Titinum plates in back of c spine    OVARY REMOVAL  1996    total    [address] [facility] BIOPSY OF BREAST, INCISIONAL Left 1988    excisional bx, benign    WISDOM TOOTH EXTRACTION         Subjective:     REVIEW OF SYSTEMS  Constitutional: denies sweats, chills and fever  HENT: denies  congestion, sinus pressure, sneezing and sore throat. Eyes: denies  pain, discharge, redness and itching.    Respiratory: denies apnea, cough  Gastrointestinal: denies blood in stool, constipation, diarrhea   Endocrine: denies cold intolerance, heat intolerance, polydipsia. Genitourinary: denies dysuria, enuresis, flank pain and hematuria. Musculoskeletal: denies arthralgias, joint swelling and neck pain. Neurological: denies numbness and headaches. Psychiatric/Behavioral: denies agitation, confusion, decreased concentration and dysphoric mood    All others reviewed and are negative. Objective:     /78   Pulse 75   Wt 127 lb (57.6 kg)   BMI 23.23 kg/m²     Wt Readings from Last 3 Encounters:   08/31/22 127 lb (57.6 kg)   08/24/22 126 lb (57.2 kg)   06/15/22 127 lb (57.6 kg)     BP Readings from Last 3 Encounters:   08/31/22 136/78   08/24/22 118/64   05/18/22 110/68       PHYSICAL EXAM  Constitutional: Oriented to person, place, and time. Appears well-developed and well-nourished. HENT:   Head: Normocephalic and atraumatic. Eyes: EOM are normal. Pupils are equal, round, and reactive to light. Neck: Normal range of motion. Neck supple. No JVD present. Cardiovascular: Normal rate , normal heart sounds and intact distal pulses. Pulmonary/Chest: Effort normal and breath sounds normal. No respiratory distress. No wheezes. No rales. Abdominal: Soft. Bowel sounds are normal. No distension. There is no tenderness. Musculoskeletal: Normal range of motion. No edema. Neurological: Alert and oriented to person, place, and time. No cranial nerve deficit. Coordination normal.   Skin: Skin is warm and dry. Psychiatric: Normal mood and affect.        No results found for: CKTOTAL, CKMB, CKMBINDEX    Lab Results   Component Value Date/Time    WBC 4.9 02/09/2022 08:31 AM    RBC 4.80 02/09/2022 08:31 AM    RBC 4.49 03/22/2012 02:19 PM    HGB 14.8 02/09/2022 08:31 AM    HCT 44.6 02/09/2022 08:31 AM    MCV 92.9 02/09/2022 08:31 AM    MCH 30.8 02/09/2022 08:31 AM    MCHC 33.2 02/09/2022 08:31 AM    RDW 13.5 04/28/2020 05:18 PM     02/09/2022 08:31 AM MPV 8.8 02/09/2022 08:31 AM       Lab Results   Component Value Date/Time     02/09/2022 08:31 AM    K 4.0 02/09/2022 08:31 AM     02/09/2022 08:31 AM    CO2 26 02/09/2022 08:31 AM    BUN 15 02/09/2022 08:31 AM    LABALBU 4.4 02/09/2022 08:31 AM    LABALBU 4.3 03/22/2012 02:19 PM    CREATININE 0.7 02/09/2022 08:31 AM    CALCIUM 9.6 02/09/2022 08:31 AM    LABGLOM 83 02/09/2022 08:31 AM    GLUCOSE 94 04/28/2020 05:18 PM    GLUCOSE 94 05/14/2016 06:30 AM       Lab Results   Component Value Date/Time    ALKPHOS 77 02/09/2022 08:31 AM    ALT 23 02/09/2022 08:31 AM    AST 21 02/09/2022 08:31 AM    PROT 7.4 02/09/2022 08:31 AM    BILITOT 0.4 02/09/2022 08:31 AM    BILIDIR <0.2 02/02/2021 09:17 AM    LABALBU 4.4 02/09/2022 08:31 AM    LABALBU 4.3 03/22/2012 02:19 PM       No results found for: MG    No results found for: INR, PROTIME      Lab Results   Component Value Date/Time    LABA1C 5.4 05/11/2019 12:00 AM       Lab Results   Component Value Date/Time    TRIG 113 02/09/2022 08:31 AM    HDL 70 02/09/2022 08:31 AM    LDLCALC 164 02/09/2022 08:31 AM       Lab Results   Component Value Date/Time    TSH 1.550 02/09/2022 08:31 AM         Testing Reviewed:      I haveindividually reviewed the below cardiac tests    EKG:    ECHO:   Results for orders placed during the hospital encounter of 08/28/18   Echocardiogram complete    Narrative Transthoracic Echocardiography Report (TTE)     Demographics      Patient Name    Vandana Green  Gender               Female                   D      MR #            [id number]       Race                                                       Ethnicity      Account #       [de-identified]       Room Number      Accession       [id number]       Date of Study        08/28/2018   Number      Date of Birth   1955      Referring Physician  María Knox MD      Age             70 year(s)      Sonographer          Kehsav Tesfaye RDCS                                      Interpreting Olivia Quijano MD                                   Physician     Procedure    Type of Study      TTE procedure:ECHOCARDIOGRAM COMPLETE 2D W DOPPLER W COLOR. Procedure Date  Date: 08/28/2018 Start: 09:48 AM    Study Location: Echo Lab  Technical Quality: Adequate visualization    Indications:Abnormal ECG and Mitral valve prolapse. Additional Medical History:Mitral valve prolapse, Osteoarthritis,  Hyperlipidemia, GERD, Hypertension, Family history of heart disease    Patient Status: Routine    Height: 61.81 inches Weight: 132 pounds BSA: 1.6 m^2 BMI: 24.29 kg/m^2    BP: 112/70 mmHg     Conclusions      Summary   Overall left ventricular function is normal.   Ejection fraction is visually estimated at 60%. Signature      ----------------------------------------------------------------   Electronically signed by Olivia Quijano MD (Interpreting   physician) on 08/29/2018 at 10:25 AM   ----------------------------------------------------------------      Findings      Mitral Valve   The mitral valve structure was normal with normal leaflet separation. DOPPLER: The transmitral velocity was within the normal range with no   evidence for mitral stenosis. There was no evidence of mitral   regurgitation. Aortic Valve   The aortic valve was trileaflet with normal thickness and cuspal   separation. DOPPLER: Transaortic velocity was within the normal range with   no evidence of aortic stenosis. There was no evidence of aortic   regurgitation. Tricuspid Valve   Mild tricuspid regurgitation. Pulmonic Valve   The pulmonic valve was not well visualized . Trivial pulmonic regurgitation visualized. Left Atrium   Left atrial size was normal.      Left Ventricle   Overall left ventricular function is normal.   Ejection fraction is visually estimated at 60%.       Right Atrium   Right atrial size was normal.      Right Ventricle   The right ventricular size was normal with normal systolic Lateral Velocity:                          PV Peak Gradient: 2.03   9.5 cm/s                                         mmHg   MV A' Lateral Velocity:   12.2 cm/s   E/E' septal: 15.31                               AK ED Velocity: 115 cm/s   E/E' lateral: 10.32   MR Velocity: 441 cm/s     [email or web address] [id number]%3d       STRESS:    CATH:    Assessment/Plan       Diagnosis Orders   1. Atrial fibrillation, unspecified type (HCC)  EKG 12 Lead      2. Paroxysmal atrial fibrillation (HCC)          Hx Afib on Eliquis     Reviewed Zio monitor  [id number]: 2  Currently on beta blocker and eliquis   Doing well since L knee surgery. Understands bleeding risk fo eliquis and wants to continue   The patient is asked to make an attempt to improve diet and exercise patterns to aid in medical management of this problem. Advised more plant based nutrition/meditarrean diet   Advised patient to call office or seek immediate medical attention if there is any new onset of  any chest pain, sob, palpitations, lightheadedness, dizziness, orthopnea, PND or pedal edema. All medication side effects were discussed in details. Thank youfor allowing me to participate in the care of this patient. Please do not hesitate to contact me for any further questions. Return in about 1 year (around 8/31/2023), or if symptoms worsen or fail to improve, for Review testing, Regular follow up.        Electronically signed by Shanon Isabel MD [facility]  8/31/2022 at 9:11 AM

## 2022-09-09 ENCOUNTER — TELEPHONE (OUTPATIENT)
Dept: CARDIOLOGY CLINIC | Age: 67
End: 2022-09-09

## 2022-09-14 NOTE — TELEPHONE ENCOUNTER
Below is not an approval, it is an appeal     We received a denial for appeal   Spoke with pt and she is in the donut hole   Samples provided for pt

## 2022-10-14 ENCOUNTER — TELEPHONE (OUTPATIENT)
Dept: FAMILY MEDICINE CLINIC | Age: 67
End: 2022-10-14

## 2022-10-14 NOTE — TELEPHONE ENCOUNTER
Patient states her  was just in for a cough and now she has it too. She has not taken anything for this as of yet. Advised to push fluids, OTC cough/cold med and to go to ED/UC over weekend if worsens. No concerns vocied.

## 2023-01-19 DIAGNOSIS — I48.0 PAROXYSMAL ATRIAL FIBRILLATION (HCC): ICD-10-CM

## 2023-01-19 RX ORDER — METOPROLOL SUCCINATE 25 MG/1
12.5 TABLET, EXTENDED RELEASE ORAL SEE ADMIN INSTRUCTIONS
Qty: 45 TABLET | Refills: 0 | Status: SHIPPED | OUTPATIENT
Start: 2023-01-19

## 2023-01-19 NOTE — TELEPHONE ENCOUNTER
Last visit- 8/24/2022  Next visit- 2/28/2023    Requested Prescriptions     Pending Prescriptions Disp Refills    metoprolol succinate (TOPROL XL) 25 MG extended release tablet 45 tablet 3     Sig: Take 0.5 tablets by mouth See Admin Instructions PT reports taking 3 x's a week*

## 2023-02-06 DIAGNOSIS — I48.0 PAROXYSMAL ATRIAL FIBRILLATION (HCC): ICD-10-CM

## 2023-02-06 RX ORDER — METOPROLOL SUCCINATE 25 MG/1
12.5 TABLET, EXTENDED RELEASE ORAL SEE ADMIN INSTRUCTIONS
Qty: 45 TABLET | Refills: 0 | Status: SHIPPED | OUTPATIENT
Start: 2023-02-06

## 2023-02-06 NOTE — TELEPHONE ENCOUNTER
Refills pending. Pt is taking 1/2 a pill at least twice a week, or on average 4.5 1/2 pills per month.

## 2023-03-17 ENCOUNTER — NURSE ONLY (OUTPATIENT)
Dept: LAB | Age: 68
End: 2023-03-17

## 2023-03-17 ENCOUNTER — TELEPHONE (OUTPATIENT)
Dept: FAMILY MEDICINE CLINIC | Age: 68
End: 2023-03-17

## 2023-03-17 DIAGNOSIS — I10 ESSENTIAL HYPERTENSION: Primary | ICD-10-CM

## 2023-03-17 DIAGNOSIS — E78.00 PURE HYPERCHOLESTEROLEMIA: ICD-10-CM

## 2023-03-17 DIAGNOSIS — I34.1 MVP (MITRAL VALVE PROLAPSE): ICD-10-CM

## 2023-03-17 DIAGNOSIS — I10 ESSENTIAL HYPERTENSION: ICD-10-CM

## 2023-03-17 LAB
DEPRECATED RDW RBC AUTO: 40.8 FL (ref 35–45)
ERYTHROCYTE [DISTWIDTH] IN BLOOD BY AUTOMATED COUNT: 12.4 % (ref 11.5–14.5)
HCT VFR BLD AUTO: 41.1 % (ref 37–47)
HGB BLD-MCNC: 13.8 GM/DL (ref 12–16)
MCH RBC QN AUTO: 30.6 PG (ref 26–33)
MCHC RBC AUTO-ENTMCNC: 33.6 GM/DL (ref 32.2–35.5)
MCV RBC AUTO: 91.1 FL (ref 81–99)
PLATELET # BLD AUTO: 263 THOU/MM3 (ref 130–400)
PMV BLD AUTO: 9.3 FL (ref 9.4–12.4)
RBC # BLD AUTO: 4.51 MILL/MM3 (ref 4.2–5.4)
WBC # BLD AUTO: 5.3 THOU/MM3 (ref 4.8–10.8)

## 2023-03-17 NOTE — TELEPHONE ENCOUNTER
Patient in office for lab draw. Lab orders were not in chart. Orders for TSH, Lipid, CMP and CBC re-entered.

## 2023-03-18 LAB
ALBUMIN SERPL BCG-MCNC: 4.3 G/DL (ref 3.5–5.1)
ALP SERPL-CCNC: 66 U/L (ref 38–126)
ALT SERPL W/O P-5'-P-CCNC: 21 U/L (ref 11–66)
ANION GAP SERPL CALC-SCNC: 10 MEQ/L (ref 8–16)
AST SERPL-CCNC: 19 U/L (ref 5–40)
BILIRUB SERPL-MCNC: 0.3 MG/DL (ref 0.3–1.2)
BUN SERPL-MCNC: 15 MG/DL (ref 7–22)
CALCIUM SERPL-MCNC: 9.6 MG/DL (ref 8.5–10.5)
CHLORIDE SERPL-SCNC: 103 MEQ/L (ref 98–111)
CHOLEST SERPL-MCNC: 194 MG/DL (ref 100–199)
CO2 SERPL-SCNC: 27 MEQ/L (ref 23–33)
CREAT SERPL-MCNC: 0.7 MG/DL (ref 0.4–1.2)
GFR SERPL CREATININE-BSD FRML MDRD: > 60 ML/MIN/1.73M2
GLUCOSE SERPL-MCNC: 85 MG/DL (ref 70–108)
HDLC SERPL-MCNC: 65 MG/DL
LDLC SERPL CALC-MCNC: 113 MG/DL
POTASSIUM SERPL-SCNC: 4.3 MEQ/L (ref 3.5–5.2)
PROT SERPL-MCNC: 6.8 G/DL (ref 6.1–8)
SODIUM SERPL-SCNC: 140 MEQ/L (ref 135–145)
TRIGL SERPL-MCNC: 79 MG/DL (ref 0–199)
TSH SERPL DL<=0.005 MIU/L-ACNC: 1.13 UIU/ML (ref 0.4–4.2)

## 2023-03-22 ENCOUNTER — OFFICE VISIT (OUTPATIENT)
Dept: FAMILY MEDICINE CLINIC | Age: 68
End: 2023-03-22
Payer: MEDICARE

## 2023-03-22 VITALS
BODY MASS INDEX: 23.55 KG/M2 | HEIGHT: 62 IN | DIASTOLIC BLOOD PRESSURE: 76 MMHG | RESPIRATION RATE: 16 BRPM | OXYGEN SATURATION: 99 % | SYSTOLIC BLOOD PRESSURE: 136 MMHG | WEIGHT: 128 LBS | HEART RATE: 86 BPM | TEMPERATURE: 97.6 F

## 2023-03-22 DIAGNOSIS — L30.1 DYSHIDROSIS: ICD-10-CM

## 2023-03-22 DIAGNOSIS — S83.512A RUPTURE OF ANTERIOR CRUCIATE LIGAMENT OF LEFT KNEE, INITIAL ENCOUNTER: ICD-10-CM

## 2023-03-22 DIAGNOSIS — E78.00 PURE HYPERCHOLESTEROLEMIA: ICD-10-CM

## 2023-03-22 DIAGNOSIS — K43.2 INCISIONAL HERNIA, WITHOUT OBSTRUCTION OR GANGRENE: ICD-10-CM

## 2023-03-22 DIAGNOSIS — K21.9 GASTROESOPHAGEAL REFLUX DISEASE WITHOUT ESOPHAGITIS: ICD-10-CM

## 2023-03-22 DIAGNOSIS — I48.0 PAROXYSMAL ATRIAL FIBRILLATION (HCC): ICD-10-CM

## 2023-03-22 DIAGNOSIS — H61.23 BILATERAL IMPACTED CERUMEN: ICD-10-CM

## 2023-03-22 DIAGNOSIS — M15.9 PRIMARY OSTEOARTHRITIS INVOLVING MULTIPLE JOINTS: ICD-10-CM

## 2023-03-22 DIAGNOSIS — J30.2 SEASONAL ALLERGIES: ICD-10-CM

## 2023-03-22 DIAGNOSIS — I10 ESSENTIAL HYPERTENSION: Primary | ICD-10-CM

## 2023-03-22 DIAGNOSIS — M85.80 OSTEOPENIA DETERMINED BY X-RAY: ICD-10-CM

## 2023-03-22 DIAGNOSIS — I34.1 MVP (MITRAL VALVE PROLAPSE): ICD-10-CM

## 2023-03-22 DIAGNOSIS — M48.02 CERVICAL STENOSIS OF SPINE: ICD-10-CM

## 2023-03-22 DIAGNOSIS — K59.09 OTHER CONSTIPATION: ICD-10-CM

## 2023-03-22 PROCEDURE — G8399 PT W/DXA RESULTS DOCUMENT: HCPCS | Performed by: FAMILY MEDICINE

## 2023-03-22 PROCEDURE — 69209 REMOVE IMPACTED EAR WAX UNI: CPT | Performed by: FAMILY MEDICINE

## 2023-03-22 PROCEDURE — 1123F ACP DISCUSS/DSCN MKR DOCD: CPT | Performed by: FAMILY MEDICINE

## 2023-03-22 PROCEDURE — G8420 CALC BMI NORM PARAMETERS: HCPCS | Performed by: FAMILY MEDICINE

## 2023-03-22 PROCEDURE — G8427 DOCREV CUR MEDS BY ELIG CLIN: HCPCS | Performed by: FAMILY MEDICINE

## 2023-03-22 PROCEDURE — 3017F COLORECTAL CA SCREEN DOC REV: CPT | Performed by: FAMILY MEDICINE

## 2023-03-22 PROCEDURE — 1090F PRES/ABSN URINE INCON ASSESS: CPT | Performed by: FAMILY MEDICINE

## 2023-03-22 PROCEDURE — G8484 FLU IMMUNIZE NO ADMIN: HCPCS | Performed by: FAMILY MEDICINE

## 2023-03-22 PROCEDURE — 1036F TOBACCO NON-USER: CPT | Performed by: FAMILY MEDICINE

## 2023-03-22 PROCEDURE — 3078F DIAST BP <80 MM HG: CPT | Performed by: FAMILY MEDICINE

## 2023-03-22 PROCEDURE — 3075F SYST BP GE 130 - 139MM HG: CPT | Performed by: FAMILY MEDICINE

## 2023-03-22 PROCEDURE — 99214 OFFICE O/P EST MOD 30 MIN: CPT | Performed by: FAMILY MEDICINE

## 2023-03-22 PROCEDURE — G0009 ADMIN PNEUMOCOCCAL VACCINE: HCPCS | Performed by: FAMILY MEDICINE

## 2023-03-22 PROCEDURE — 90677 PCV20 VACCINE IM: CPT | Performed by: FAMILY MEDICINE

## 2023-03-22 SDOH — ECONOMIC STABILITY: HOUSING INSECURITY
IN THE LAST 12 MONTHS, WAS THERE A TIME WHEN YOU DID NOT HAVE A STEADY PLACE TO SLEEP OR SLEPT IN A SHELTER (INCLUDING NOW)?: NO

## 2023-03-22 SDOH — ECONOMIC STABILITY: INCOME INSECURITY: HOW HARD IS IT FOR YOU TO PAY FOR THE VERY BASICS LIKE FOOD, HOUSING, MEDICAL CARE, AND HEATING?: NOT HARD AT ALL

## 2023-03-22 SDOH — ECONOMIC STABILITY: FOOD INSECURITY: WITHIN THE PAST 12 MONTHS, YOU WORRIED THAT YOUR FOOD WOULD RUN OUT BEFORE YOU GOT MONEY TO BUY MORE.: NEVER TRUE

## 2023-03-22 SDOH — ECONOMIC STABILITY: FOOD INSECURITY: WITHIN THE PAST 12 MONTHS, THE FOOD YOU BOUGHT JUST DIDN'T LAST AND YOU DIDN'T HAVE MONEY TO GET MORE.: NEVER TRUE

## 2023-03-22 ASSESSMENT — PATIENT HEALTH QUESTIONNAIRE - PHQ9
SUM OF ALL RESPONSES TO PHQ QUESTIONS 1-9: 0
2. FEELING DOWN, DEPRESSED OR HOPELESS: 0
SUM OF ALL RESPONSES TO PHQ QUESTIONS 1-9: 0
SUM OF ALL RESPONSES TO PHQ9 QUESTIONS 1 & 2: 0
SUM OF ALL RESPONSES TO PHQ QUESTIONS 1-9: 0
SUM OF ALL RESPONSES TO PHQ QUESTIONS 1-9: 0
1. LITTLE INTEREST OR PLEASURE IN DOING THINGS: 0

## 2023-03-22 NOTE — PROGRESS NOTES
Immunizations Administered       Name Date Dose Route    Pneumococcal, PCV20, PREVNAR 21, (age 18y+), IM, 0.5mL 3/22/2023 0.5 mL Intramuscular    Site: Deltoid- Left    Lot: [id number]    NDC: 4750-1998-50            VIS GIVEN. CONSENT SIGNED  PATIENT TOLERATED WELL. 1.   Are you sick today? No  2. Do you have allergies to medication,food, or any vaccine? No          Allergies:  Latex, Citalopram, Codeine, and Lipitor [atorvastatin]    3. Have you ever had a serious reaction after receiving a vaccination? No  4. Do you have a long-term health problem with heart disease, lung disease, asthma, kidney disease,        metabolic disease (e.g., diabetes, anemia, or other blood disorder? No  5. Do you have cancer, leukemia, AIDS, or any other immune system problem? No  6. Have you had a seizure, brain, or other nervous system problem? No          Medical History:    Past Medical History:   Diagnosis Date    Allergic rhinitis     Bulging discs 2013    OIO    Chickenpox     COVID-19 01/2022    Essential hypertension 09/13/2017    Gastroesophageal reflux disease without esophagitis 05/23/2016    History of kidney stones     Hyperlipidemia     MVP (mitral valve prolapse)     Osteoarthritis     Pneumonia 1992    Recurrent UTI        7. Do you take cortisone, prednisone, other steroids, or anticancer drugs, or have you had radiation        Treatments?   No          Current Medications:    Current Outpatient Medications   Medication Sig Dispense Refill    metoprolol succinate (TOPROL XL) 25 MG extended release tablet Take 0.5 tablets by mouth See Admin Instructions PT reports taking 3 x's a week* 45 tablet 0    apixaban (ELIQUIS) 2.5 MG TABS tablet TAKE 1 TABLET TWICE A  tablet 3    pitavastatin (LIVALO) 2 MG TABS tablet Take 1 tablet by mouth nightly 90 tablet 1    omeprazole (PRILOSEC) 40 MG delayed release capsule TAKE 1 CAPSULE DAILY 90 capsule 3    montelukast (SINGULAIR) 10 MG tablet Take 10 mg by
CERUMEN IRRIGATION/LVG UNILAT    Other orders  -     Pneumococcal, PCV20, PREVNAR 20, (age 25 yrs+), IM, PF      No change to medications   Continue healthy diet and exercise  Aspirin daily     Discussed use, benefit, and side effectsof prescribed medications. All patient questions answered. Pt voiced understanding. Reviewed health maintenance. Instructed to continue current medications, diet and exercise. Patient agreed with treatment plan. Followup as directed.      Electronically signed by Za Hamilton MD

## 2023-07-12 DIAGNOSIS — K21.9 GASTROESOPHAGEAL REFLUX DISEASE WITHOUT ESOPHAGITIS: ICD-10-CM

## 2023-07-12 RX ORDER — OMEPRAZOLE 40 MG/1
CAPSULE, DELAYED RELEASE ORAL
Qty: 90 CAPSULE | Refills: 3 | Status: SHIPPED | OUTPATIENT
Start: 2023-07-12

## 2023-08-28 ENCOUNTER — HOSPITAL ENCOUNTER (OUTPATIENT)
Dept: WOMENS IMAGING | Age: 68
Discharge: HOME | End: 2023-08-28
Payer: MEDICARE

## 2023-08-28 VITALS — BODY MASS INDEX: 24.17 KG/M2 | WEIGHT: 128 LBS | HEIGHT: 61 IN

## 2023-08-28 DIAGNOSIS — Z12.31 VISIT FOR SCREENING MAMMOGRAM: ICD-10-CM

## 2023-08-28 PROCEDURE — 77063 BREAST TOMOSYNTHESIS BI: CPT

## 2023-09-13 ENCOUNTER — OFFICE VISIT (OUTPATIENT)
Dept: CARDIOLOGY CLINIC | Age: 68
End: 2023-09-13
Payer: MEDICARE

## 2023-09-13 VITALS
DIASTOLIC BLOOD PRESSURE: 68 MMHG | SYSTOLIC BLOOD PRESSURE: 130 MMHG | HEART RATE: 81 BPM | HEIGHT: 61 IN | BODY MASS INDEX: 24.55 KG/M2 | WEIGHT: 130 LBS

## 2023-09-13 DIAGNOSIS — I48.0 PAROXYSMAL ATRIAL FIBRILLATION (HCC): Primary | ICD-10-CM

## 2023-09-13 DIAGNOSIS — I48.0 PAROXYSMAL ATRIAL FIBRILLATION (HCC): ICD-10-CM

## 2023-09-13 DIAGNOSIS — R07.9 CHEST PAIN IN ADULT: ICD-10-CM

## 2023-09-13 PROCEDURE — 3075F SYST BP GE 130 - 139MM HG: CPT | Performed by: INTERNAL MEDICINE

## 2023-09-13 PROCEDURE — 3017F COLORECTAL CA SCREEN DOC REV: CPT | Performed by: INTERNAL MEDICINE

## 2023-09-13 PROCEDURE — 99214 OFFICE O/P EST MOD 30 MIN: CPT | Performed by: INTERNAL MEDICINE

## 2023-09-13 PROCEDURE — 1090F PRES/ABSN URINE INCON ASSESS: CPT | Performed by: INTERNAL MEDICINE

## 2023-09-13 PROCEDURE — G8427 DOCREV CUR MEDS BY ELIG CLIN: HCPCS | Performed by: INTERNAL MEDICINE

## 2023-09-13 PROCEDURE — 1123F ACP DISCUSS/DSCN MKR DOCD: CPT | Performed by: INTERNAL MEDICINE

## 2023-09-13 PROCEDURE — 1036F TOBACCO NON-USER: CPT | Performed by: INTERNAL MEDICINE

## 2023-09-13 PROCEDURE — 3078F DIAST BP <80 MM HG: CPT | Performed by: INTERNAL MEDICINE

## 2023-09-13 PROCEDURE — G8399 PT W/DXA RESULTS DOCUMENT: HCPCS | Performed by: INTERNAL MEDICINE

## 2023-09-13 PROCEDURE — G8420 CALC BMI NORM PARAMETERS: HCPCS | Performed by: INTERNAL MEDICINE

## 2023-09-13 PROCEDURE — 93000 ELECTROCARDIOGRAM COMPLETE: CPT | Performed by: INTERNAL MEDICINE

## 2023-09-13 RX ORDER — VITAMIN B COMPLEX
100 TABLET ORAL DAILY
COMMUNITY

## 2023-09-13 NOTE — TELEPHONE ENCOUNTER
Issac Fay called requesting a refill on the following medications:  Requested Prescriptions     Pending Prescriptions Disp Refills    apixaban (ELIQUIS) 2.5 MG TABS tablet 180 tablet 3     Sig: TAKE 1 TABLET TWICE A DAY     Ph[facility]   . pv      Date of last visit:   Date of next visit (if applicable): Visit date not found

## 2023-09-13 NOTE — PROGRESS NOTES
[address] [place] [address]  Dept: [phone]  Dept Fax: [phone]  Loc: [phone]    Visit Date: 9/13/2023    Ms. Sonny Alvarenga is a 77 y.o. female  who presented for:  Chief Complaint   Patient presents with    Check-Up    Atrial Fibrillation       HPI:   76 yo F hx of  Afib on Eliquis is here for a follow up. Underwent L knee surgery 9/2021. Otherwise no symptoms. Denies any chest pain, sob, palpitations, lightheadedness, dizziness, orthopnea, PND or pedal edema.          Current Outpatient Medications:     Coenzyme Q10 (COQ10) 100 MG CAPS, Take 100 mg by mouth daily, Disp: , Rfl:     omeprazole (PRILOSEC) 40 MG delayed release capsule, TAKE 1 CAPSULE DAILY, Disp: 90 capsule, Rfl: 3    metoprolol succinate (TOPROL XL) 25 MG extended release tablet, Take 0.5 tablets by mouth See Admin Instructions PT reports taking 3 x's a week*, Disp: 45 tablet, Rfl: 0    apixaban (ELIQUIS) 2.5 MG TABS tablet, TAKE 1 TABLET TWICE A DAY, Disp: 180 tablet, Rfl: 3    pitavastatin (LIVALO) 2 MG TABS tablet, Take 1 tablet by mouth nightly (Patient taking differently: Take 0.5 tablets by mouth nightly), Disp: 90 tablet, Rfl: 1    montelukast (SINGULAIR) 10 MG tablet, Take 1 tablet by mouth nightly, Disp: , Rfl:     fluticasone (FLONASE) 50 MCG/ACT nasal spray, USE 2 SPRAYS IN EACH NOSTRIL ONCE DAILY (Patient taking differently: 1 spray), Disp: 48 g, Rfl: 11    calcium carbonate (OSCAL) 500 MG TABS tablet, Take 1 tablet by mouth daily Patient is taking 3 times weekly, Disp: , Rfl:     cetirizine (ZYRTEC) 5 MG tablet, Take 1 tablet by mouth daily, Disp: , Rfl:     Acetaminophen (TYLENOL EXTRA STRENGTH PO), Take by mouth 2 times daily as needed , Disp: , Rfl:     famotidine (PEPCID) 20 MG tablet, Take 1 tablet by mouth daily, Disp: , Rfl:     therapeutic multivitamin-minerals (THERAGRAN-M) tablet, Take 1 tablet by mouth daily, Disp: , Rfl:     Past Medical

## 2023-09-13 NOTE — PROGRESS NOTES
Pt here for 1 yr check up     EKG done today     Pt c/o chest pain, dizziness     Pt continues with heart palpitations

## 2023-09-26 NOTE — PROGRESS NOTES
[facility]  [address] [facility]  Dept: [phone]  Dept Fax: [phone]  Loc: Ashley Adams is a 77 y.o. female who presents today for:  No chief complaint on file. HPI:     HPI    Here for pain in both shoulders and neck for the past month. She had her left knee replaced 9/13/21. The mornings are the worst for the shoulder pain as well as the knees/legs. Arm pain in the upper arms and neck. Tried:  Tylenol prn was not helping much.  mobic did help but she is on eliquis and started with nose bleeds and had to stop the mobic. Massage at home helps. Left knee is doing well. The neck and shoulders are slowly getting better which is a little better on celebrex. Left hand now has tingling and numbness in the 4th and 5th fingers. She originally thought it was due to using the cane and walker a lot but the left fingers are worse. Better now that she is not using the cane. ***        Hypertension is controlled on current medications. Afib under the management of cardiology. She reports the toprol was stopped but the cardiologist note reports that he was going to continue the beta blocker. Hyperlipidemia: Patient presents with hyperlipidemia. She was tested because family history.   Her last labs showed   Lab Results   Component Value Date    CHOL 194 03/17/2023    CHOL 257 (H) 02/09/2022    CHOL 190 02/02/2021     Lab Results   Component Value Date    TRIG 79 03/17/2023    TRIG 113 02/09/2022    TRIG 107 02/02/2021     Lab Results   Component Value Date    HDL 65 03/17/2023    HDL 70 02/09/2022    HDL 62 02/02/2021     Lab Results   Component Value Date    LDLCALC 113 03/17/2023    LDLCALC 164 02/09/2022    LDLCALC 107 02/02/2021     Lab Results   Component Value Date    VLDL 20 05/11/2019    VLDL 23 05/14/2016    VLDL 21 10/10/2015     Lab Results   Component Value Date

## 2023-09-27 ENCOUNTER — OFFICE VISIT (OUTPATIENT)
Dept: FAMILY MEDICINE CLINIC | Age: 68
End: 2023-09-27
Payer: MEDICARE

## 2023-09-27 VITALS
OXYGEN SATURATION: 98 % | TEMPERATURE: 97.9 F | WEIGHT: 132.4 LBS | SYSTOLIC BLOOD PRESSURE: 116 MMHG | RESPIRATION RATE: 16 BRPM | DIASTOLIC BLOOD PRESSURE: 62 MMHG | HEIGHT: 61 IN | BODY MASS INDEX: 25 KG/M2 | HEART RATE: 72 BPM

## 2023-09-27 DIAGNOSIS — E78.00 PURE HYPERCHOLESTEROLEMIA: ICD-10-CM

## 2023-09-27 DIAGNOSIS — M85.80 OSTEOPENIA DETERMINED BY X-RAY: ICD-10-CM

## 2023-09-27 DIAGNOSIS — M48.02 CERVICAL STENOSIS OF SPINE: ICD-10-CM

## 2023-09-27 DIAGNOSIS — K21.9 GASTROESOPHAGEAL REFLUX DISEASE WITHOUT ESOPHAGITIS: ICD-10-CM

## 2023-09-27 DIAGNOSIS — K59.09 OTHER CONSTIPATION: ICD-10-CM

## 2023-09-27 DIAGNOSIS — I48.0 PAROXYSMAL ATRIAL FIBRILLATION (HCC): ICD-10-CM

## 2023-09-27 DIAGNOSIS — J30.2 SEASONAL ALLERGIES: ICD-10-CM

## 2023-09-27 DIAGNOSIS — M15.9 PRIMARY OSTEOARTHRITIS INVOLVING MULTIPLE JOINTS: ICD-10-CM

## 2023-09-27 DIAGNOSIS — I34.1 MVP (MITRAL VALVE PROLAPSE): ICD-10-CM

## 2023-09-27 DIAGNOSIS — L30.1 DYSHIDROSIS: ICD-10-CM

## 2023-09-27 DIAGNOSIS — I10 ESSENTIAL HYPERTENSION: ICD-10-CM

## 2023-09-27 DIAGNOSIS — Z00.00 MEDICARE ANNUAL WELLNESS VISIT, SUBSEQUENT: Primary | ICD-10-CM

## 2023-09-27 DIAGNOSIS — K43.2 INCISIONAL HERNIA, WITHOUT OBSTRUCTION OR GANGRENE: ICD-10-CM

## 2023-09-27 PROCEDURE — 3074F SYST BP LT 130 MM HG: CPT | Performed by: FAMILY MEDICINE

## 2023-09-27 PROCEDURE — 3017F COLORECTAL CA SCREEN DOC REV: CPT | Performed by: FAMILY MEDICINE

## 2023-09-27 PROCEDURE — 1123F ACP DISCUSS/DSCN MKR DOCD: CPT | Performed by: FAMILY MEDICINE

## 2023-09-27 PROCEDURE — 3078F DIAST BP <80 MM HG: CPT | Performed by: FAMILY MEDICINE

## 2023-09-27 PROCEDURE — G0439 PPPS, SUBSEQ VISIT: HCPCS | Performed by: FAMILY MEDICINE

## 2023-09-27 ASSESSMENT — PATIENT HEALTH QUESTIONNAIRE - PHQ9
SUM OF ALL RESPONSES TO PHQ QUESTIONS 1-9: 0
2. FEELING DOWN, DEPRESSED OR HOPELESS: 0
SUM OF ALL RESPONSES TO PHQ QUESTIONS 1-9: 0
SUM OF ALL RESPONSES TO PHQ9 QUESTIONS 1 & 2: 0
1. LITTLE INTEREST OR PLEASURE IN DOING THINGS: 0

## 2023-09-27 ASSESSMENT — LIFESTYLE VARIABLES
HOW MANY STANDARD DRINKS CONTAINING ALCOHOL DO YOU HAVE ON A TYPICAL DAY: 1 OR 2
HOW OFTEN DO YOU HAVE A DRINK CONTAINING ALCOHOL: 2-3 TIMES A WEEK

## 2023-09-27 NOTE — PROGRESS NOTES
Medicare Annual Wellness Visit    Kailey Zaldivar is here for Medicare AW    Assessment & Plan   Medicare annual wellness visit, subsequent  Essential hypertension  -     CBC; Future  Pure hypercholesterolemia  -     Comprehensive Metabolic Panel, Fasting; Future  -     Lipid Panel; Future  Gastroesophageal reflux disease without esophagitis  Paroxysmal atrial fibrillation (HCC)  -     TSH with Reflex; Future  Seasonal allergies  MVP (mitral valve prolapse)  Primary osteoarthritis involving multiple joints  Osteopenia determined by x-ray  Cervical stenosis of spine  Other constipation  Dyshidrosis  Incisional hernia, without obstruction or gangrene    Recommendations for Preventive Services Due: see orders and patient instructions/AVS.  Recommended screening schedule for the next 5-10 years is provided to the patient in written form: see Patient Instructions/AVS.     Return in 6 months (on 3/27/2024). Subjective     Here for pain in both shoulders and neck for the past month. She had her left knee replaced 9/13/21. The mornings are the worst for the shoulder pain as well as the knees/legs. Arm pain in the upper arms and neck. Tried:  Tylenol prn was not helping much.  mobic did help but she is on eliquis and started with nose bleeds and had to stop the mobic. Massage at home helps. Left knee is doing well. The neck and shoulders are slowly getting better which is a little better on celebrex. Left hand now has tingling and numbness in the 4th and 5th fingers. She originally thought it was due to using the cane and walker a lot but the left fingers are worse. Better now that she is not using the cane. Hypertension is controlled on current medications. Afib under the management of cardiology. She reports the toprol was stopped but the cardiologist note reports that he was going to continue the beta blocker. Hyperlipidemia: Patient presents with hyperlipidemia.   She was tested

## 2023-12-11 ENCOUNTER — TELEPHONE (OUTPATIENT)
Dept: FAMILY MEDICINE CLINIC | Age: 68
End: 2023-12-11

## 2023-12-11 DIAGNOSIS — R09.81 CONGESTION OF NASAL SINUS: Primary | ICD-10-CM

## 2023-12-11 RX ORDER — PITAVASTATIN 2 MG/1
1 TABLET, FILM COATED ORAL NIGHTLY
Qty: 90 TABLET | Refills: 1 | Status: SHIPPED | OUTPATIENT
Start: 2023-12-11

## 2023-12-11 NOTE — TELEPHONE ENCOUNTER
Pt wants referral to ent for nasal congestion and polyps that have been going on for several year. s

## 2023-12-11 NOTE — TELEPHONE ENCOUNTER
[facility] for referral but also consider CT sinus prior to going so they can review it when she is there    Call patient

## 2023-12-14 ENCOUNTER — HOSPITAL ENCOUNTER (OUTPATIENT)
Dept: CT IMAGING | Age: 68
Discharge: HOME | End: 2023-12-14
Attending: FAMILY MEDICINE
Payer: MEDICARE

## 2023-12-14 DIAGNOSIS — R09.81 CONGESTION OF NASAL SINUS: ICD-10-CM

## 2023-12-14 PROCEDURE — 70486 CT MAXILLOFACIAL W/O DYE: CPT

## 2024-01-11 ENCOUNTER — TELEPHONE (OUTPATIENT)
Dept: FAMILY MEDICINE CLINIC | Age: 69
End: 2024-01-11

## 2024-01-11 NOTE — TELEPHONE ENCOUNTER
Pt has been complaining of back and hip pain that has occurred since New Years. Okay to use a same day spot next week? Please advise.

## 2024-01-15 NOTE — PROGRESS NOTES
[facility]  [facility]  [address]  [address]  [facility] [address]  Dept: [phone]  Dept Fax: [phone]  Loc: [phone]    Radha Velazquez is a 68 y.o. female who presents today for:  Chief Complaint   Patient presents with    Back Pain     Mid back to right side x 2 weeks. No known injury    Hip Pain     Right hip    Abdominal Pain    Urinary Frequency    Constipation       HPI:     HPI  Here for back and hip pain worse the past 2-3 weeks.  Worse wearing anything tight around the waits to the right hip and low back.  Also had a lot of gas but better now.  BM  is more loose and not completely emptying and the stool is more narrow.  Tried dulcolax with some increased movement.  The best thing to relieve it is to lay flat on her back but getting into bed hurts.  Some weakness in the right hip but not down into the leg.  No dysuria but frequency.    Reviewed chart forpast medical history , surgical history , allergies, social history , family history and medications.    Health Maintenance   Topic Date Due    Respiratory Syncytial Virus (RSV) Pregnant or age 60 yrs+ (1 - 1-dose 60+ series) Never done    COVID-19 Vaccine (5 - 2023-24 season) 09/01/2023    Shingles vaccine (1 of 2) 03/22/2024 (Originally 6/22/2015)    Lipids  03/17/2024    Annual Wellness Visit (Medicare)  09/27/2024    Depression Screen  01/16/2025    Breast cancer screen  08/28/2025    Colorectal Cancer Screen  08/23/2028    DTaP/Tdap/Td vaccine (2 - Td or Tdap) 01/21/2030    DEXA (modify frequency per FRAX score)  Completed    Flu vaccine  Completed    Pneumococcal 65+ years Vaccine  Completed    Hepatitis C screen  Completed    Hepatitis A vaccine  Aged Out    Hepatitis B vaccine  Aged Out    Hib vaccine  Aged Out    Polio vaccine  Aged Out    Meningococcal (ACWY) vaccine  Aged Out       Subjective:      Constitutional:Negative for fever, chills, diaphoresis, activity change, appetite

## 2024-01-16 ENCOUNTER — HOSPITAL ENCOUNTER (OUTPATIENT)
Dept: CT IMAGING | Age: 69
Discharge: HOME | End: 2024-01-16
Payer: MEDICARE

## 2024-01-16 ENCOUNTER — OFFICE VISIT (OUTPATIENT)
Dept: FAMILY MEDICINE CLINIC | Age: 69
End: 2024-01-16

## 2024-01-16 VITALS
HEIGHT: 61 IN | OXYGEN SATURATION: 95 % | DIASTOLIC BLOOD PRESSURE: 66 MMHG | WEIGHT: 131.61 LBS | HEART RATE: 80 BPM | SYSTOLIC BLOOD PRESSURE: 112 MMHG | RESPIRATION RATE: 16 BRPM | TEMPERATURE: 98.3 F | BODY MASS INDEX: 24.85 KG/M2

## 2024-01-16 DIAGNOSIS — R10.9 RIGHT FLANK PAIN: ICD-10-CM

## 2024-01-16 DIAGNOSIS — R10.31 RLQ ABDOMINAL PAIN: ICD-10-CM

## 2024-01-16 DIAGNOSIS — R19.8: ICD-10-CM

## 2024-01-16 DIAGNOSIS — R31.29 OTHER MICROSCOPIC HEMATURIA: ICD-10-CM

## 2024-01-16 DIAGNOSIS — R35.0 URINARY FREQUENCY: ICD-10-CM

## 2024-01-16 DIAGNOSIS — R10.84 GENERALIZED ABDOMINAL PAIN: Primary | ICD-10-CM

## 2024-01-16 DIAGNOSIS — R10.84 GENERALIZED ABDOMINAL PAIN: ICD-10-CM

## 2024-01-16 LAB
ALBUMIN SERPL BCG-MCNC: 4.6 G/DL (ref 3.5–5.1)
ALP SERPL-CCNC: 60 U/L (ref 38–126)
ALT SERPL W/O P-5'-P-CCNC: 20 U/L (ref 11–66)
ANION GAP SERPL CALC-SCNC: 12 MEQ/L (ref 8–16)
AST SERPL-CCNC: 19 U/L (ref 5–40)
BILIRUB SERPL-MCNC: 0.3 MG/DL (ref 0.3–1.2)
BILIRUBIN URINE: NEGATIVE
BLOOD URINE, POC: NORMAL
BUN SERPL-MCNC: 11 MG/DL (ref 7–22)
CALCIUM SERPL-MCNC: 9.6 MG/DL (ref 8.5–10.5)
CHARACTER, URINE: CLEAR
CHLORIDE SERPL-SCNC: 105 MEQ/L (ref 98–111)
CO2 SERPL-SCNC: 27 MEQ/L (ref 23–33)
COLOR, URINE: YELLOW
CREAT BLD-MCNC: 0.6 MG/DL (ref 0.5–1.2)
DEPRECATED RDW RBC AUTO: 43.8 FL (ref 35–45)
ERYTHROCYTE [DISTWIDTH] IN BLOOD BY AUTOMATED COUNT: 12.6 % (ref 11.5–14.5)
GFR SERPL CREATININE-BSD FRML MDRD: > 60 ML/MIN/1.73M2
GLUCOSE SERPL-MCNC: 79 MG/DL (ref 70–108)
GLUCOSE URINE: NEGATIVE MG/DL
HCT VFR BLD AUTO: 44.8 % (ref 37–47)
HGB BLD-MCNC: 14.9 GM/DL (ref 12–16)
KETONES, URINE: NEGATIVE
LEUKOCYTE CLUMPS, URINE: NEGATIVE
LIPASE SERPL-CCNC: 35.8 U/L (ref 5.6–51.3)
MCH RBC QN AUTO: 31.2 PG (ref 26–33)
MCHC RBC AUTO-ENTMCNC: 33.3 GM/DL (ref 32.2–35.5)
MCV RBC AUTO: 93.7 FL (ref 81–99)
NITRITE, URINE: NEGATIVE
PH, URINE: 6.5 (ref 5–9)
PLATELET # BLD AUTO: 265 THOU/MM3 (ref 130–400)
PMV BLD AUTO: 9.3 FL (ref 9.4–12.4)
POTASSIUM SERPL-SCNC: 4.5 MEQ/L (ref 3.5–5.2)
PROT SERPL-MCNC: 7.4 G/DL (ref 6.1–8)
PROTEIN, URINE: NEGATIVE MG/DL
RBC # BLD AUTO: 4.78 MILL/MM3 (ref 4.2–5.4)
SODIUM SERPL-SCNC: 144 MEQ/L (ref 135–145)
SPECIFIC GRAVITY, URINE: 1.01 (ref 1–1.03)
UROBILINOGEN, URINE: 0.2 EU/DL (ref 0–1)
WBC # BLD AUTO: 5.8 THOU/MM3 (ref 4.8–10.8)

## 2024-01-16 PROCEDURE — 82565 ASSAY OF CREATININE: CPT

## 2024-01-16 PROCEDURE — 85027 COMPLETE CBC AUTOMATED: CPT

## 2024-01-16 PROCEDURE — 84460 ALANINE AMINO (ALT) (SGPT): CPT

## 2024-01-16 PROCEDURE — 80051 ELECTROLYTE PANEL: CPT

## 2024-01-16 PROCEDURE — 82040 ASSAY OF SERUM ALBUMIN: CPT

## 2024-01-16 PROCEDURE — 6360000004 HC RX CONTRAST MEDICATION: Performed by: FAMILY MEDICINE

## 2024-01-16 PROCEDURE — 82310 ASSAY OF CALCIUM: CPT

## 2024-01-16 PROCEDURE — 82947 ASSAY GLUCOSE BLOOD QUANT: CPT

## 2024-01-16 PROCEDURE — 83690 ASSAY OF LIPASE: CPT

## 2024-01-16 PROCEDURE — 84520 ASSAY OF UREA NITROGEN: CPT

## 2024-01-16 PROCEDURE — 84450 TRANSFERASE (AST) (SGOT): CPT

## 2024-01-16 PROCEDURE — 82247 BILIRUBIN TOTAL: CPT

## 2024-01-16 PROCEDURE — 84075 ASSAY ALKALINE PHOSPHATASE: CPT

## 2024-01-16 PROCEDURE — 74178 CT ABD&PLV WO CNTR FLWD CNTR: CPT

## 2024-01-16 PROCEDURE — 36415 COLL VENOUS BLD VENIPUNCTURE: CPT

## 2024-01-16 PROCEDURE — 84155 ASSAY OF PROTEIN SERUM: CPT

## 2024-01-16 RX ORDER — AZELASTINE 1 MG/ML
1 SPRAY, METERED NASAL 2 TIMES DAILY
COMMUNITY

## 2024-01-16 RX ADMIN — IOPAMIDOL 100 ML: 755 INJECTION, SOLUTION INTRAVENOUS at 14:08

## 2024-01-16 ASSESSMENT — PATIENT HEALTH QUESTIONNAIRE - PHQ9
SUM OF ALL RESPONSES TO PHQ QUESTIONS 1-9: 0
SUM OF ALL RESPONSES TO PHQ QUESTIONS 1-9: 0
1. LITTLE INTEREST OR PLEASURE IN DOING THINGS: 0
SUM OF ALL RESPONSES TO PHQ QUESTIONS 1-9: 0
2. FEELING DOWN, DEPRESSED OR HOPELESS: 0
SUM OF ALL RESPONSES TO PHQ9 QUESTIONS 1 & 2: 0
SUM OF ALL RESPONSES TO PHQ QUESTIONS 1-9: 0

## 2024-01-17 ENCOUNTER — HOSPITAL ENCOUNTER (OUTPATIENT)
Dept: CT IMAGING | Age: 69
Discharge: HOME | End: 2024-01-17
Payer: MEDICARE

## 2024-01-17 DIAGNOSIS — R10.84 GENERALIZED ABDOMINAL PAIN: ICD-10-CM

## 2024-01-17 DIAGNOSIS — R31.9 HEMATURIA, UNSPECIFIED TYPE: ICD-10-CM

## 2024-01-17 PROCEDURE — 74178 CT ABD&PLV WO CNTR FLWD CNTR: CPT

## 2024-01-18 LAB — BACTERIA UR CULT: NORMAL

## 2024-02-08 ENCOUNTER — TELEPHONE (OUTPATIENT)
Dept: CARDIOLOGY CLINIC | Age: 69
End: 2024-02-08

## 2024-02-08 NOTE — TELEPHONE ENCOUNTER
Pre op Risk Assessment    Procedure Colonoscopy   [facility]  Date of surgery/procedure TBD    Last OV 09/13/2023  Last Stress 08/2018  Last Echo 08/2018  Last Cath None in epic   Last Stent None in epic  Is patient on blood thinners Eliquis  Hold Meds/how many days requesting 2 days    Fax clearance to [phone]

## 2024-03-27 ENCOUNTER — OFFICE VISIT (OUTPATIENT)
Dept: FAMILY MEDICINE CLINIC | Age: 69
End: 2024-03-27

## 2024-03-27 VITALS
RESPIRATION RATE: 14 BRPM | HEART RATE: 73 BPM | BODY MASS INDEX: 25.01 KG/M2 | DIASTOLIC BLOOD PRESSURE: 70 MMHG | SYSTOLIC BLOOD PRESSURE: 122 MMHG | WEIGHT: 132.28 LBS | TEMPERATURE: 97.9 F

## 2024-03-27 DIAGNOSIS — I34.1 MVP (MITRAL VALVE PROLAPSE): ICD-10-CM

## 2024-03-27 DIAGNOSIS — Z78.0 ASYMPTOMATIC POSTMENOPAUSAL STATE: ICD-10-CM

## 2024-03-27 DIAGNOSIS — K21.9 GASTROESOPHAGEAL REFLUX DISEASE WITHOUT ESOPHAGITIS: ICD-10-CM

## 2024-03-27 DIAGNOSIS — J30.2 SEASONAL ALLERGIES: ICD-10-CM

## 2024-03-27 DIAGNOSIS — I10 ESSENTIAL HYPERTENSION: Primary | ICD-10-CM

## 2024-03-27 DIAGNOSIS — M48.02 CERVICAL STENOSIS OF SPINE: ICD-10-CM

## 2024-03-27 DIAGNOSIS — H61.23 BILATERAL IMPACTED CERUMEN: ICD-10-CM

## 2024-03-27 DIAGNOSIS — E78.00 PURE HYPERCHOLESTEROLEMIA: ICD-10-CM

## 2024-03-27 DIAGNOSIS — K43.2 INCISIONAL HERNIA, WITHOUT OBSTRUCTION OR GANGRENE: ICD-10-CM

## 2024-03-27 DIAGNOSIS — K59.09 OTHER CONSTIPATION: ICD-10-CM

## 2024-03-27 DIAGNOSIS — I48.0 PAROXYSMAL ATRIAL FIBRILLATION (HCC): ICD-10-CM

## 2024-03-27 DIAGNOSIS — S83.512A RUPTURE OF ANTERIOR CRUCIATE LIGAMENT OF LEFT KNEE, INITIAL ENCOUNTER: ICD-10-CM

## 2024-03-27 DIAGNOSIS — M85.80 OSTEOPENIA DETERMINED BY X-RAY: ICD-10-CM

## 2024-03-27 DIAGNOSIS — L30.1 DYSHIDROSIS: ICD-10-CM

## 2024-03-27 DIAGNOSIS — M15.9 PRIMARY OSTEOARTHRITIS INVOLVING MULTIPLE JOINTS: ICD-10-CM

## 2024-03-27 DIAGNOSIS — R35.0 URINARY FREQUENCY: ICD-10-CM

## 2024-03-27 SDOH — ECONOMIC STABILITY: FOOD INSECURITY: WITHIN THE PAST 12 MONTHS, THE FOOD YOU BOUGHT JUST DIDN'T LAST AND YOU DIDN'T HAVE MONEY TO GET MORE.: NEVER TRUE

## 2024-03-27 SDOH — ECONOMIC STABILITY: FOOD INSECURITY: WITHIN THE PAST 12 MONTHS, YOU WORRIED THAT YOUR FOOD WOULD RUN OUT BEFORE YOU GOT MONEY TO BUY MORE.: NEVER TRUE

## 2024-03-27 SDOH — ECONOMIC STABILITY: INCOME INSECURITY: HOW HARD IS IT FOR YOU TO PAY FOR THE VERY BASICS LIKE FOOD, HOUSING, MEDICAL CARE, AND HEATING?: NOT HARD AT ALL

## 2024-03-27 NOTE — PROGRESS NOTES
Explained ear wax removal procedure to patient with patient verbalizing understanding. Unilateral irrigation performed with warm irrigation fluid, noting small amount of cerumen flushed from left ear successfully. Patient tolerated well. Ear canal now clear, tympanic membrane visible.  
devika. Instructions for home care to prevent wax buildup are given.    Assessment/Plan   Radha was seen today for 6 month follow-up.    Diagnoses and all orders for this visit:    Essential hypertension    Paroxysmal atrial fibrillation (HCC)    Pure hypercholesterolemia    Seasonal allergies    Primary osteoarthritis involving multiple joints    Osteopenia determined by x-ray    Dyshidrosis    Other constipation    Rupture of anterior cruciate ligament of left knee, initial encounter    Incisional hernia, without obstruction or gangrene    Cervical stenosis of spine    MVP (mitral valve prolapse)    Gastroesophageal reflux disease without esophagitis    Urinary frequency    Asymptomatic postmenopausal state  -     DEXA BONE DENSITY AXIAL SKELETON; Future    Bilateral impacted cerumen  -     49351 - [place] REMOVAL IMPACTED CERUMEN IRRIGATION/LVG UNILAT    No change to medications   Continue healthy diet and exercise  Aspirin daily    Discussed use, benefit, and side effectsof prescribed medications.  All patient questions answered.  Pt voiced understanding. Reviewed health maintenance.  Instructed to continue current medications, diet and exercise.  Patient agreed with treatment plan. Followup as directed.     Electronically signed by Marilyn Resendiz MD

## 2024-04-14 NOTE — PROGRESS NOTES
in the left and mild mucosal thickening in the right ethmoid air cells.. The lamina papyracea are intact. The cribriform plates and fovea ethmoidalis are relatively symmetric.     Sphenoid sinus: Normally aerated and pneumatized. The sphenoethmoidal recesses are patent.     Maxillary sinuses: There is a small retention cyst or polyp in the left maxillary sinus. The right maxillary sinus is clear. There is abnormal soft tissue density in the ostium of the left maxillary sinus. The ostium on the right side is patent..      Nasal cavity: The nasal septum is deviated towards the right side. 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 No polyps or masses are noted.     The mastoid air cells and middle ear cavities are normally aerated.     There are no suspicious findings in the imaged aspects of the brain parenchyma and facial soft tissues.     IMPRESSION:     1. Moderate mucosal thickening in the left and mild mucosal thickening in the right ethmoid air cells.  2. Small retention cyst or polyp in the left maxillary sinus.  3. Abnormal soft tissue density in the region of the ostium of the left maxillary sinus.  4. Nasal septum is deviated towards the right side.  5. 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        **This report has been created using voice recognition software. It may contain minor errors which are inherent in voice recognition technology.**     Final report electronically signed by DR DAVID NIXON on 12/15/2023 8:26 AM    See below: nasal septal deviation to the left anteriorly and right more posteriorly. Hypertrophic bilateral inferior nasal turbinates more prominent to right at time of imaging. Kely bullosa to left middle nasal turbinate and bilateral superior nasal turbinates abutting septal wall.              Assessment/Plan:      ICD-10-CM    1. Nasal obstruction  J34.89 CT FACIAL BONES WO CONTRAST      2. Hypertrophy of both inferior nasal turbinates  J34.3 CT FACIAL BONES WO CONTRAST

## 2024-04-15 ENCOUNTER — OFFICE VISIT (OUTPATIENT)
Dept: ENT CLINIC | Age: 69
End: 2024-04-15
Payer: MEDICARE

## 2024-04-15 VITALS
RESPIRATION RATE: 18 BRPM | DIASTOLIC BLOOD PRESSURE: 78 MMHG | TEMPERATURE: 97.6 F | WEIGHT: 131.6 LBS | OXYGEN SATURATION: 96 % | BODY MASS INDEX: 24.22 KG/M2 | HEIGHT: 62 IN | HEART RATE: 75 BPM | SYSTOLIC BLOOD PRESSURE: 136 MMHG

## 2024-04-15 DIAGNOSIS — R06.83 LOUD SNORING: ICD-10-CM

## 2024-04-15 DIAGNOSIS — J34.2 NASAL SEPTAL DEVIATION: ICD-10-CM

## 2024-04-15 DIAGNOSIS — R51.9 RHINOGENIC HEADACHE: ICD-10-CM

## 2024-04-15 DIAGNOSIS — R09.89 THROAT CLEARING: ICD-10-CM

## 2024-04-15 DIAGNOSIS — K21.9 GASTROESOPHAGEAL REFLUX DISEASE, UNSPECIFIED WHETHER ESOPHAGITIS PRESENT: ICD-10-CM

## 2024-04-15 DIAGNOSIS — J32.0 CHRONIC MAXILLARY SINUSITIS: ICD-10-CM

## 2024-04-15 DIAGNOSIS — R06.5 MOUTH BREATHING: ICD-10-CM

## 2024-04-15 DIAGNOSIS — J30.89 NON-SEASONAL ALLERGIC RHINITIS, UNSPECIFIED TRIGGER: ICD-10-CM

## 2024-04-15 DIAGNOSIS — R09.82 POST-NASAL DRAINAGE: ICD-10-CM

## 2024-04-15 DIAGNOSIS — J34.89 NASAL OBSTRUCTION: Primary | ICD-10-CM

## 2024-04-15 DIAGNOSIS — J34.3 HYPERTROPHY OF BOTH INFERIOR NASAL TURBINATES: ICD-10-CM

## 2024-04-15 PROCEDURE — 3017F COLORECTAL CA SCREEN DOC REV: CPT | Performed by: REGISTERED NURSE

## 2024-04-15 PROCEDURE — 1123F ACP DISCUSS/DSCN MKR DOCD: CPT | Performed by: REGISTERED NURSE

## 2024-04-15 PROCEDURE — 3078F DIAST BP <80 MM HG: CPT | Performed by: REGISTERED NURSE

## 2024-04-15 PROCEDURE — G8427 DOCREV CUR MEDS BY ELIG CLIN: HCPCS | Performed by: REGISTERED NURSE

## 2024-04-15 PROCEDURE — 1036F TOBACCO NON-USER: CPT | Performed by: REGISTERED NURSE

## 2024-04-15 PROCEDURE — 1090F PRES/ABSN URINE INCON ASSESS: CPT | Performed by: REGISTERED NURSE

## 2024-04-15 PROCEDURE — G8399 PT W/DXA RESULTS DOCUMENT: HCPCS | Performed by: REGISTERED NURSE

## 2024-04-15 PROCEDURE — 3075F SYST BP GE 130 - 139MM HG: CPT | Performed by: REGISTERED NURSE

## 2024-04-15 PROCEDURE — G8420 CALC BMI NORM PARAMETERS: HCPCS | Performed by: REGISTERED NURSE

## 2024-04-15 PROCEDURE — 99204 OFFICE O/P NEW MOD 45 MIN: CPT | Performed by: REGISTERED NURSE

## 2024-04-15 RX ORDER — AMOXICILLIN AND CLAVULANATE POTASSIUM 875; 125 MG/1; MG/1
1 TABLET, FILM COATED ORAL 2 TIMES DAILY
Qty: 20 TABLET | Refills: 0 | Status: SHIPPED | OUTPATIENT
Start: 2024-04-15 | End: 2024-04-25

## 2024-04-15 NOTE — PATIENT INSTRUCTIONS
Flush each side of nose with saline in a NeilMed bottle, filled with distilled water and a NeilMed packet twice a day. Lean over and face down, rinse up one side and let it run out the other nostril. Then do the other side the same way. If it runs down your throat, lean over farther.     Buffered normal saline nasal irrigation   The benefits   Saline (saltwater) washes the mucus and irritants from your nose.  The sinus passages are moisturized.  Studies have also shown that a nasal irrigation improves the function of cilia (tiny hair-like structures on cells that move the mucus).   The recipe   Use a one-quart glass jar that is thoroughly cleansed.  You may use a large medical syringe (30 cc), water pick with an irrigation tip, squeeze bottle, or Neti pot. Do not use a baby bulb syringe. The syringe or pick should be sterilized frequently or replaced every 2 to 3 weeks to avoid contamination and infection.  Fill with water that has been distilled, previously boiled, or otherwise sterilized. Plain tap water is not recommended, because it is not necessarily sterile.  Add 1 to 1½ heaping teaspoons of pickling/magda salt. Do not use table salt, because it contains a large number of additives.  You may also make up a solution from premixed packets that are commercially prepared specifically for nasal irrigation.   The instructions   Irrigate your nose with saline 1 to 2 times per day.  If you have been told to use nasal medication, you should always use your saline solution first. The nasal medication is much more effective when sprayed onto clean nasal membranes, and the spray will reach deeper into the nose.  Pour the amount of fluid you plan to use into a clean bowl. Do not put your used syringe back into the storage container, because it contaminates your solution.  You may warm the solution slightly in the microwave but be sure that the solution is not hot.  Bend over the sink (some people do this in the shower)

## 2024-05-13 ENCOUNTER — HOSPITAL ENCOUNTER (OUTPATIENT)
Dept: CT IMAGING | Age: 69
Discharge: HOME | End: 2024-05-13
Attending: REGISTERED NURSE
Payer: MEDICARE

## 2024-05-13 DIAGNOSIS — R09.82 POST-NASAL DRAINAGE: ICD-10-CM

## 2024-05-13 DIAGNOSIS — J32.0 CHRONIC MAXILLARY SINUSITIS: ICD-10-CM

## 2024-05-13 DIAGNOSIS — R51.9 RHINOGENIC HEADACHE: ICD-10-CM

## 2024-05-13 DIAGNOSIS — J34.2 NASAL SEPTAL DEVIATION: ICD-10-CM

## 2024-05-13 DIAGNOSIS — R06.5 MOUTH BREATHING: ICD-10-CM

## 2024-05-13 DIAGNOSIS — J34.3 HYPERTROPHY OF BOTH INFERIOR NASAL TURBINATES: ICD-10-CM

## 2024-05-13 DIAGNOSIS — J30.89 NON-SEASONAL ALLERGIC RHINITIS, UNSPECIFIED TRIGGER: ICD-10-CM

## 2024-05-13 DIAGNOSIS — J34.89 NASAL OBSTRUCTION: ICD-10-CM

## 2024-05-13 DIAGNOSIS — R06.83 LOUD SNORING: ICD-10-CM

## 2024-05-13 PROCEDURE — 70486 CT MAXILLOFACIAL W/O DYE: CPT

## 2024-05-22 ENCOUNTER — NURSE ONLY (OUTPATIENT)
Dept: LAB | Age: 69
End: 2024-05-22

## 2024-05-22 DIAGNOSIS — E78.00 PURE HYPERCHOLESTEROLEMIA: ICD-10-CM

## 2024-05-22 DIAGNOSIS — I48.0 PAROXYSMAL ATRIAL FIBRILLATION (HCC): ICD-10-CM

## 2024-05-22 DIAGNOSIS — I10 ESSENTIAL HYPERTENSION: ICD-10-CM

## 2024-05-22 LAB
ALBUMIN SERPL BCG-MCNC: 4.3 G/DL (ref 3.5–5.1)
ALP SERPL-CCNC: 62 U/L (ref 38–126)
ALT SERPL W/O P-5'-P-CCNC: 21 U/L (ref 11–66)
ANION GAP SERPL CALC-SCNC: 13 MEQ/L (ref 8–16)
AST SERPL-CCNC: 19 U/L (ref 5–40)
BILIRUB SERPL-MCNC: 0.4 MG/DL (ref 0.3–1.2)
BUN SERPL-MCNC: 15 MG/DL (ref 7–22)
CALCIUM SERPL-MCNC: 9.5 MG/DL (ref 8.5–10.5)
CHLORIDE SERPL-SCNC: 105 MEQ/L (ref 98–111)
CHOLEST SERPL-MCNC: 199 MG/DL (ref 100–199)
CO2 SERPL-SCNC: 26 MEQ/L (ref 23–33)
CREAT SERPL-MCNC: 0.6 MG/DL (ref 0.4–1.2)
DEPRECATED RDW RBC AUTO: 42.3 FL (ref 35–45)
ERYTHROCYTE [DISTWIDTH] IN BLOOD BY AUTOMATED COUNT: 12.5 % (ref 11.5–14.5)
GFR SERPL CREATININE-BSD FRML MDRD: > 90 ML/MIN/1.73M2
GLUCOSE FASTING: 89 MG/DL (ref 70–108)
HCT VFR BLD AUTO: 42.9 % (ref 37–47)
HDLC SERPL-MCNC: 64 MG/DL
HGB BLD-MCNC: 14.3 GM/DL (ref 12–16)
LDLC SERPL CALC-MCNC: 114 MG/DL
MCH RBC QN AUTO: 30.8 PG (ref 26–33)
MCHC RBC AUTO-ENTMCNC: 33.3 GM/DL (ref 32.2–35.5)
MCV RBC AUTO: 92.5 FL (ref 81–99)
PLATELET # BLD AUTO: 232 THOU/MM3 (ref 130–400)
PMV BLD AUTO: 9.3 FL (ref 9.4–12.4)
POTASSIUM SERPL-SCNC: 4.2 MEQ/L (ref 3.5–5.2)
PROT SERPL-MCNC: 7.1 G/DL (ref 6.1–8)
RBC # BLD AUTO: 4.64 MILL/MM3 (ref 4.2–5.4)
SODIUM SERPL-SCNC: 144 MEQ/L (ref 135–145)
TRIGL SERPL-MCNC: 105 MG/DL (ref 0–199)
TSH SERPL DL<=0.005 MIU/L-ACNC: 1.52 UIU/ML (ref 0.4–4.2)
WBC # BLD AUTO: 4.9 THOU/MM3 (ref 4.8–10.8)

## 2024-06-11 ENCOUNTER — TELEPHONE (OUTPATIENT)
Dept: FAMILY MEDICINE CLINIC | Age: 69
End: 2024-06-11

## 2024-06-11 NOTE — TELEPHONE ENCOUNTER
Patient called stating that since Sunday she has had a low grade temp off 99.5 with a headache. Pt states that when she took tylenol that the temp when down to 98 and HA went away.   Nurse advised that temperature is not considered a fever till 100.5 and if her sx go away with tylenol  to just monitor and let the office know if anything worsens. Also advised an increase of hydration.  Pt also states that she is having right hip pain, doesn't know if her hip is out or siatica pain. Pt is seeing chiro today for the hip

## 2024-06-17 DIAGNOSIS — K21.9 GASTROESOPHAGEAL REFLUX DISEASE WITHOUT ESOPHAGITIS: ICD-10-CM

## 2024-06-17 RX ORDER — OMEPRAZOLE 40 MG/1
CAPSULE, DELAYED RELEASE ORAL
Qty: 90 CAPSULE | Refills: 1 | Status: SHIPPED | OUTPATIENT
Start: 2024-06-17

## 2024-06-26 ENCOUNTER — TELEPHONE (OUTPATIENT)
Dept: FAMILY MEDICINE CLINIC | Age: 69
End: 2024-06-26

## 2024-06-26 NOTE — TELEPHONE ENCOUNTER
Pt called stating that she tested positive for COVID this AM. Her sx's began Monday. She is complaining of a cough, hoarseness, and a slight headache. Writer advised that she could use OTC cold remedies. Pt was also advised to get up and try and walk about every half hour or so. Please advise if there is anything else.

## 2024-06-26 NOTE — TELEPHONE ENCOUNTER
Paxlovid sent to the pharmacy  Take eliquis every other day while on paxlovid  Stop livalo while on the medication  Daily zinc, vitamin D and C  Push water and gatorade 60-80 oz daily  Eat 5-6 small meals daily with protein in all meals  Walk every 1.5 hours for 10-15 minutes  Call or return to clinic prn if these symptoms worsen or fail to improve as anticipated.

## 2024-08-30 NOTE — PATIENT INSTRUCTIONS
Your Provider for Today: Dr. Saleh  Your nurses for today: Sheila    You may receive a survey regarding the care you received during your visit.  Your input is valuable to us.  We encourage you to complete and return your survey.  We hope you will choose us in the future for your healthcare needs.

## 2024-09-04 ENCOUNTER — OFFICE VISIT (OUTPATIENT)
Dept: CARDIOLOGY CLINIC | Age: 69
End: 2024-09-04
Payer: MEDICARE

## 2024-09-04 VITALS
SYSTOLIC BLOOD PRESSURE: 122 MMHG | WEIGHT: 129 LBS | DIASTOLIC BLOOD PRESSURE: 78 MMHG | HEART RATE: 79 BPM | HEIGHT: 62 IN | BODY MASS INDEX: 23.74 KG/M2

## 2024-09-04 DIAGNOSIS — I48.0 PAROXYSMAL ATRIAL FIBRILLATION (HCC): Primary | ICD-10-CM

## 2024-09-04 PROCEDURE — 3017F COLORECTAL CA SCREEN DOC REV: CPT | Performed by: INTERNAL MEDICINE

## 2024-09-04 PROCEDURE — G8420 CALC BMI NORM PARAMETERS: HCPCS | Performed by: INTERNAL MEDICINE

## 2024-09-04 PROCEDURE — 1036F TOBACCO NON-USER: CPT | Performed by: INTERNAL MEDICINE

## 2024-09-04 PROCEDURE — 1123F ACP DISCUSS/DSCN MKR DOCD: CPT | Performed by: INTERNAL MEDICINE

## 2024-09-04 PROCEDURE — 3074F SYST BP LT 130 MM HG: CPT | Performed by: INTERNAL MEDICINE

## 2024-09-04 PROCEDURE — G8427 DOCREV CUR MEDS BY ELIG CLIN: HCPCS | Performed by: INTERNAL MEDICINE

## 2024-09-04 PROCEDURE — G8399 PT W/DXA RESULTS DOCUMENT: HCPCS | Performed by: INTERNAL MEDICINE

## 2024-09-04 PROCEDURE — 99214 OFFICE O/P EST MOD 30 MIN: CPT | Performed by: INTERNAL MEDICINE

## 2024-09-04 PROCEDURE — 93000 ELECTROCARDIOGRAM COMPLETE: CPT | Performed by: INTERNAL MEDICINE

## 2024-09-04 PROCEDURE — 1090F PRES/ABSN URINE INCON ASSESS: CPT | Performed by: INTERNAL MEDICINE

## 2024-09-04 PROCEDURE — 3078F DIAST BP <80 MM HG: CPT | Performed by: INTERNAL MEDICINE

## 2024-09-04 NOTE — PROGRESS NOTES
[facility]ESSION[facility]  [facility] CARDIOLOGY  [address]  [facility] [address]  Dept: [phone]  Dept Fax: [phone]  Loc: [phone]    Visit Date: 9/4/2024    Ms. Velazquez is a 69 y.o. female  who presented for:  Chief Complaint   Patient presents with    Check-Up    Atrial Fibrillation       HPI:   70 yo F hx of  Afib on Eliquis is here for a follow up.  Otherwise no symptoms.  Denies any chest pain, sob, palpitations, lightheadedness, dizziness, orthopnea, PND or pedal edema.         Current Outpatient Medications:     omeprazole (PRILOSEC) 40 MG delayed release capsule, TAKE 1 CAPSULE DAILY, Disp: 90 capsule, Rfl: 1    pitavastatin (LIVALO) 2 MG TABS tablet, TAKE 1 TABLET NIGHTLY (Patient taking differently: Take 0.5 tablets by mouth nightly), Disp: 90 tablet, Rfl: 1    apixaban (ELIQUIS) 2.5 MG TABS tablet, TAKE 1 TABLET TWICE A DAY, Disp: 180 tablet, Rfl: 3    Coenzyme Q10 (COQ10) 100 MG CAPS, Take 100 mg by mouth daily, Disp: , Rfl:     metoprolol succinate (TOPROL XL) 25 MG extended release tablet, Take 0.5 tablets by mouth See Admin Instructions PT reports taking 3 x's a week*, Disp: 45 tablet, Rfl: 0    montelukast (SINGULAIR) 10 MG tablet, Take 1 tablet by mouth nightly, Disp: , Rfl:     fluticasone (FLONASE) 50 MCG/ACT nasal spray, USE 2 SPRAYS IN EACH NOSTRIL ONCE DAILY (Patient taking differently: 1 spray), Disp: 48 g, Rfl: 11    calcium carbonate (OSCAL) 500 MG TABS tablet, Take 1 tablet by mouth daily Patient is taking 3 times weekly, Disp: , Rfl:     cetirizine (ZYRTEC) 5 MG tablet, Take 1 tablet by mouth daily, Disp: , Rfl:     Acetaminophen (TYLENOL EXTRA STRENGTH PO), Take by mouth 2 times daily as needed , Disp: , Rfl:     famotidine (PEPCID) 20 MG tablet, Take 1 tablet by mouth daily, Disp: , Rfl:     therapeutic multivitamin-minerals (THERAGRAN-M) tablet, Take 1 tablet by mouth daily, Disp: , Rfl:     Past Medical History  Radha  has a past medical history

## 2024-09-04 NOTE — PROGRESS NOTES
Pt here for 1 yr check up     EKG done today     Pt continues with chest discomfort,sob at times,     Pt concerned more aches if questions if related to statin

## 2024-09-11 DIAGNOSIS — I48.0 PAROXYSMAL ATRIAL FIBRILLATION (HCC): ICD-10-CM

## 2024-10-01 SDOH — HEALTH STABILITY: PHYSICAL HEALTH: ON AVERAGE, HOW MANY MINUTES DO YOU ENGAGE IN EXERCISE AT THIS LEVEL?: 20 MIN

## 2024-10-01 SDOH — HEALTH STABILITY: PHYSICAL HEALTH: ON AVERAGE, HOW MANY DAYS PER WEEK DO YOU ENGAGE IN MODERATE TO STRENUOUS EXERCISE (LIKE A BRISK WALK)?: 2 DAYS

## 2024-10-01 ASSESSMENT — LIFESTYLE VARIABLES
HOW OFTEN DO YOU HAVE SIX OR MORE DRINKS ON ONE OCCASION: 1
HOW OFTEN DO YOU HAVE A DRINK CONTAINING ALCOHOL: 4
HOW OFTEN DO YOU HAVE A DRINK CONTAINING ALCOHOL: 2-3 TIMES A WEEK
HOW MANY STANDARD DRINKS CONTAINING ALCOHOL DO YOU HAVE ON A TYPICAL DAY: 1 OR 2
HOW MANY STANDARD DRINKS CONTAINING ALCOHOL DO YOU HAVE ON A TYPICAL DAY: 1

## 2024-10-01 ASSESSMENT — PATIENT HEALTH QUESTIONNAIRE - PHQ9
1. LITTLE INTEREST OR PLEASURE IN DOING THINGS: NOT AT ALL
SUM OF ALL RESPONSES TO PHQ QUESTIONS 1-9: 0
SUM OF ALL RESPONSES TO PHQ QUESTIONS 1-9: 0
2. FEELING DOWN, DEPRESSED OR HOPELESS: NOT AT ALL
SUM OF ALL RESPONSES TO PHQ QUESTIONS 1-9: 0
SUM OF ALL RESPONSES TO PHQ QUESTIONS 1-9: 0
SUM OF ALL RESPONSES TO PHQ9 QUESTIONS 1 & 2: 0

## 2024-10-02 ENCOUNTER — HOSPITAL ENCOUNTER (OUTPATIENT)
Age: 69
Discharge: HOME | End: 2024-10-02
Payer: MEDICARE

## 2024-10-02 ENCOUNTER — TELEPHONE (OUTPATIENT)
Dept: FAMILY MEDICINE CLINIC | Age: 69
End: 2024-10-02

## 2024-10-02 ENCOUNTER — HOSPITAL ENCOUNTER (OUTPATIENT)
Dept: GENERAL RADIOLOGY | Age: 69
Discharge: HOME | End: 2024-10-02
Payer: MEDICARE

## 2024-10-02 ENCOUNTER — OFFICE VISIT (OUTPATIENT)
Dept: FAMILY MEDICINE CLINIC | Age: 69
End: 2024-10-02
Payer: MEDICARE

## 2024-10-02 VITALS
HEIGHT: 61 IN | WEIGHT: 133.82 LBS | TEMPERATURE: 97.8 F | SYSTOLIC BLOOD PRESSURE: 130 MMHG | HEART RATE: 76 BPM | OXYGEN SATURATION: 98 % | BODY MASS INDEX: 25.27 KG/M2 | RESPIRATION RATE: 16 BRPM | DIASTOLIC BLOOD PRESSURE: 86 MMHG

## 2024-10-02 DIAGNOSIS — R53.83 OTHER FATIGUE: ICD-10-CM

## 2024-10-02 DIAGNOSIS — I34.1 MVP (MITRAL VALVE PROLAPSE): ICD-10-CM

## 2024-10-02 DIAGNOSIS — Z00.00 MEDICARE ANNUAL WELLNESS VISIT, SUBSEQUENT: Primary | ICD-10-CM

## 2024-10-02 DIAGNOSIS — M25.551 RIGHT HIP PAIN: ICD-10-CM

## 2024-10-02 DIAGNOSIS — K43.2 INCISIONAL HERNIA, WITHOUT OBSTRUCTION OR GANGRENE: ICD-10-CM

## 2024-10-02 DIAGNOSIS — G89.29 CHRONIC BILATERAL LOW BACK PAIN WITH RIGHT-SIDED SCIATICA: ICD-10-CM

## 2024-10-02 DIAGNOSIS — J33.0 POLYP OF NASAL CAVITY: ICD-10-CM

## 2024-10-02 DIAGNOSIS — M48.02 CERVICAL STENOSIS OF SPINE: ICD-10-CM

## 2024-10-02 DIAGNOSIS — I48.0 PAROXYSMAL ATRIAL FIBRILLATION (HCC): ICD-10-CM

## 2024-10-02 DIAGNOSIS — R35.0 URINARY FREQUENCY: ICD-10-CM

## 2024-10-02 DIAGNOSIS — K21.9 GASTROESOPHAGEAL REFLUX DISEASE WITHOUT ESOPHAGITIS: ICD-10-CM

## 2024-10-02 DIAGNOSIS — M54.41 CHRONIC BILATERAL LOW BACK PAIN WITH RIGHT-SIDED SCIATICA: ICD-10-CM

## 2024-10-02 DIAGNOSIS — Z23 NEED FOR INFLUENZA VACCINATION: ICD-10-CM

## 2024-10-02 DIAGNOSIS — M85.80 OSTEOPENIA DETERMINED BY X-RAY: ICD-10-CM

## 2024-10-02 DIAGNOSIS — Z78.0 POSTMENOPAUSAL: ICD-10-CM

## 2024-10-02 DIAGNOSIS — J30.2 SEASONAL ALLERGIES: ICD-10-CM

## 2024-10-02 DIAGNOSIS — K59.09 OTHER CONSTIPATION: ICD-10-CM

## 2024-10-02 DIAGNOSIS — E78.00 PURE HYPERCHOLESTEROLEMIA: ICD-10-CM

## 2024-10-02 DIAGNOSIS — R31.29 OTHER MICROSCOPIC HEMATURIA: ICD-10-CM

## 2024-10-02 DIAGNOSIS — S83.512A RUPTURE OF ANTERIOR CRUCIATE LIGAMENT OF LEFT KNEE, INITIAL ENCOUNTER: ICD-10-CM

## 2024-10-02 DIAGNOSIS — I10 ESSENTIAL HYPERTENSION: ICD-10-CM

## 2024-10-02 DIAGNOSIS — M15.0 PRIMARY OSTEOARTHRITIS INVOLVING MULTIPLE JOINTS: ICD-10-CM

## 2024-10-02 DIAGNOSIS — L30.1 DYSHIDROSIS: ICD-10-CM

## 2024-10-02 PROCEDURE — 3075F SYST BP GE 130 - 139MM HG: CPT | Performed by: FAMILY MEDICINE

## 2024-10-02 PROCEDURE — 99214 OFFICE O/P EST MOD 30 MIN: CPT | Performed by: FAMILY MEDICINE

## 2024-10-02 PROCEDURE — 3017F COLORECTAL CA SCREEN DOC REV: CPT | Performed by: FAMILY MEDICINE

## 2024-10-02 PROCEDURE — 1036F TOBACCO NON-USER: CPT | Performed by: FAMILY MEDICINE

## 2024-10-02 PROCEDURE — 90653 IIV ADJUVANT VACCINE IM: CPT | Performed by: FAMILY MEDICINE

## 2024-10-02 PROCEDURE — G0439 PPPS, SUBSEQ VISIT: HCPCS | Performed by: FAMILY MEDICINE

## 2024-10-02 PROCEDURE — G0008 ADMIN INFLUENZA VIRUS VAC: HCPCS | Performed by: FAMILY MEDICINE

## 2024-10-02 PROCEDURE — G8420 CALC BMI NORM PARAMETERS: HCPCS | Performed by: FAMILY MEDICINE

## 2024-10-02 PROCEDURE — G8482 FLU IMMUNIZE ORDER/ADMIN: HCPCS | Performed by: FAMILY MEDICINE

## 2024-10-02 PROCEDURE — G8399 PT W/DXA RESULTS DOCUMENT: HCPCS | Performed by: FAMILY MEDICINE

## 2024-10-02 PROCEDURE — 1090F PRES/ABSN URINE INCON ASSESS: CPT | Performed by: FAMILY MEDICINE

## 2024-10-02 PROCEDURE — 1123F ACP DISCUSS/DSCN MKR DOCD: CPT | Performed by: FAMILY MEDICINE

## 2024-10-02 PROCEDURE — 73502 X-RAY EXAM HIP UNI 2-3 VIEWS: CPT

## 2024-10-02 PROCEDURE — G8427 DOCREV CUR MEDS BY ELIG CLIN: HCPCS | Performed by: FAMILY MEDICINE

## 2024-10-02 PROCEDURE — 72100 X-RAY EXAM L-S SPINE 2/3 VWS: CPT

## 2024-10-02 PROCEDURE — 3079F DIAST BP 80-89 MM HG: CPT | Performed by: FAMILY MEDICINE

## 2024-10-02 RX ORDER — ESTRADIOL 0.1 MG/G
CREAM VAGINAL
COMMUNITY
Start: 2024-08-27

## 2024-10-02 NOTE — TELEPHONE ENCOUNTER
The patient stopped in stating that she would like to be referred to Dr Mihai Escobedo ([facility]) if you could.  Please advise.  Pt ph [phone]

## 2024-10-07 RX ORDER — VONOPRAZAN FUMARATE 26.72 MG/1
20 TABLET ORAL DAILY
Qty: 90 TABLET | Refills: 3 | Status: SHIPPED | COMMUNITY
Start: 2024-10-07

## 2024-10-07 NOTE — PROGRESS NOTES
[facility]  [facility] MEDICINE  [address]  [address]  [facility] [address]  Dept: [phone]  Dept Fax: [phone]  Loc: [phone]    Radha Velazquez is a 69 y.o. female who presents today for:  No chief complaint on file.          HPI:     HPI    Here for pain in both shoulders and neck for the past month.  She had her left knee replaced 9/13/21.  The mornings are the worst for the shoulder pain as well as the knees/legs.  Arm pain in the upper arms and neck.  Tried:  Tylenol prn was not helping much.  mobic did help but she is on eliquis and started with nose bleeds and had to stop the mobic.   Massage at home helps.        Left knee is doing well.  The neck and shoulders are slowly getting better which is a little better on celebrex.  Left hand now has tingling and numbness in the 4th and 5th fingers. She originally thought it was due to using the cane and walker a lot but the left fingers are worse. Better now that she is not using the cane.  ***    Hypertension is controlled on current medications.  Afib under the management of cardiology.  She reports the toprol was stopped but the cardiologist note reports that he was going to continue the beta blocker.       Hyperlipidemia: Patient presents with hyperlipidemia.  She was tested because family history.  Her last labs showed   Lab Results   Component Value Date    CHOL 199 05/22/2024    CHOL 194 03/17/2023    CHOL 257 (H) 02/09/2022     Lab Results   Component Value Date    TRIG 105 05/22/2024    TRIG 79 03/17/2023    TRIG 113 02/09/2022     Lab Results   Component Value Date    HDL 64 05/22/2024    HDL 65 03/17/2023    HDL 70 02/09/2022     No components found for: \"LDLCHOLESTEROL\", \"LDLCALC\"    Lab Results   Component Value Date    VLDL 20 05/11/2019    VLDL 23 05/14/2016    VLDL 21 10/10/2015     Lab Results   Component Value Date    CHOLHDLRATIO 2.2 05/11/2019    CHOLHDLRATIO 4.0 10/05/2013   There is 
Pt agreeable to send medication lia   
Vaccine Information Sheet, \"Influenza - Inactivated\"  given to Radha Velazquez, or parent/legal guardian of  Radha Velazquez and verbalized understanding.    Patient responses:    Have you ever had a reaction to a flu vaccine? No  Do you have an allergy to eggs, neomycin or polymixin?  No  Do you have an allergy to Thimerosal, contact lens solution, or Merthiolate? No  Have you ever had Guillian Biggers Syndrome?  No  Do you have any current illness?  No  Do you have a temperature above 100 degrees? No  Are you pregnant? No  If pregnant, permission obtained from physician? No  Do you have an active neurological disorder? No      Flu vaccine given per order. Please see immunization tab.    Immunizations Administered       Name Date Dose Route    Influenza, FLUAD, (age 65 y+), IM, Trivalent PF, 0.5mL 10/2/2024 0.5 mL Intramuscular    Site: Deltoid- Right    Lot: [id number]    NDC: 46582-781-12            VIS GIVEN.  CONSENT SIGNED  PATIENT TOLERATED WELL.     
SPRAYS IN EACH NOSTRIL ONCE DAILY  Patient taking differently: 1 spray Yes Marilyn Resendiz MD   cetirizine (ZYRTEC) 5 MG tablet Take 1 tablet by mouth daily Yes Kelly Huerta MD   Acetaminophen (TYLENOL EXTRA STRENGTH PO) Take by mouth 2 times daily as needed  Yes Kelly Huerta MD   famotidine (PEPCID) 20 MG tablet Take 1 tablet by mouth daily Yes Kelly Huerta MD   therapeutic multivitamin-minerals (THERAGRAN-M) tablet Take 1 tablet by mouth daily Yes ProviderKelly MD       CareTeam (Including outside providers/suppliers regularly involved in providing care):   Patient Care Team:  Marilyn Resendiz MD as PCP - General (Family Medicine)  [facility]arily[facility]end[facility]  Michael Saleh MD as Cardiologist (Cardiology)      Reviewed and updated this visit:  Tobacco  Allergies  Meds  Med Hx  Surg Hx  Soc Hx  Fam Hx

## 2024-10-18 DIAGNOSIS — K21.9 GASTROESOPHAGEAL REFLUX DISEASE WITHOUT ESOPHAGITIS: ICD-10-CM

## 2024-10-18 RX ORDER — VONOPRAZAN FUMARATE 26.72 MG/1
20 TABLET ORAL DAILY
Qty: 90 TABLET | Refills: 3 | Status: SHIPPED | OUTPATIENT
Start: 2024-10-18

## 2024-10-23 DIAGNOSIS — K21.9 GASTROESOPHAGEAL REFLUX DISEASE WITHOUT ESOPHAGITIS: Primary | ICD-10-CM

## 2024-10-23 RX ORDER — VONOPRAZAN FUMARATE 26.72 MG/1
1 TABLET ORAL DAILY
Qty: 30 TABLET | Refills: 0 | Status: SHIPPED | COMMUNITY
Start: 2024-10-23

## 2024-10-23 NOTE — TELEPHONE ENCOUNTER
PA is still in process for the Voquenza 20 mg tabs. Patient was given samples in the office and is doing well on the med. She is asking if samples could be given to help get her through until she gets an answer from the insurance. We do have samples, ok to give?

## 2024-10-30 ENCOUNTER — HOSPITAL ENCOUNTER (OUTPATIENT)
Dept: WOMENS IMAGING | Age: 69
Discharge: HOME | End: 2024-10-30
Attending: FAMILY MEDICINE
Payer: MEDICARE

## 2024-10-30 DIAGNOSIS — Z12.31 VISIT FOR SCREENING MAMMOGRAM: ICD-10-CM

## 2024-10-30 DIAGNOSIS — Z78.0 ASYMPTOMATIC POSTMENOPAUSAL STATE: ICD-10-CM

## 2024-10-30 PROCEDURE — 77063 BREAST TOMOSYNTHESIS BI: CPT

## 2024-10-30 PROCEDURE — 77080 DXA BONE DENSITY AXIAL: CPT

## 2024-11-13 ENCOUNTER — TELEPHONE (OUTPATIENT)
Dept: FAMILY MEDICINE CLINIC | Age: 69
End: 2024-11-13

## 2024-11-13 NOTE — TELEPHONE ENCOUNTER
Approved today by [employer]  CaseId:[id number];Status:Approved;Review Type:Prior Auth;Coverage Start Date:10/14/2024;Coverage End Date:11/13/2025;  Authorization Expiration Date: 11/12/2025

## 2024-11-13 NOTE — TELEPHONE ENCOUNTER
Voquenza  20mg    [facility]  Insurance response time is 7-14 business days.

## 2024-11-20 ENCOUNTER — LAB (OUTPATIENT)
Dept: LAB | Age: 69
End: 2024-11-20

## 2024-11-20 DIAGNOSIS — R53.83 OTHER FATIGUE: ICD-10-CM

## 2024-11-21 ENCOUNTER — OFFICE VISIT (OUTPATIENT)
Dept: ENT CLINIC | Age: 69
End: 2024-11-21
Payer: MEDICARE

## 2024-11-21 ENCOUNTER — PREP FOR PROCEDURE (OUTPATIENT)
Dept: ENT CLINIC | Age: 69
End: 2024-11-21

## 2024-11-21 VITALS
BODY MASS INDEX: 25.07 KG/M2 | RESPIRATION RATE: 18 BRPM | OXYGEN SATURATION: 96 % | TEMPERATURE: 97.4 F | SYSTOLIC BLOOD PRESSURE: 126 MMHG | DIASTOLIC BLOOD PRESSURE: 78 MMHG | HEIGHT: 61 IN | WEIGHT: 132.8 LBS | HEART RATE: 72 BPM

## 2024-11-21 DIAGNOSIS — R51.9 RHINOGENIC HEADACHE: ICD-10-CM

## 2024-11-21 DIAGNOSIS — J34.89 CONCHA BULLOSA: ICD-10-CM

## 2024-11-21 DIAGNOSIS — R06.83 LOUD SNORING: ICD-10-CM

## 2024-11-21 DIAGNOSIS — J32.2 CHRONIC ETHMOIDAL SINUSITIS: ICD-10-CM

## 2024-11-21 DIAGNOSIS — R06.5 MOUTH BREATHING: ICD-10-CM

## 2024-11-21 DIAGNOSIS — J34.89 OSTIOMEATAL COMPLEX OBSTRUCTION OF NASAL SINUS: ICD-10-CM

## 2024-11-21 DIAGNOSIS — J34.2 NASAL SEPTAL DEVIATION: Primary | ICD-10-CM

## 2024-11-21 DIAGNOSIS — J32.0 CHRONIC MAXILLARY SINUSITIS: ICD-10-CM

## 2024-11-21 DIAGNOSIS — J34.2 DEVIATED NASAL SEPTUM: ICD-10-CM

## 2024-11-21 DIAGNOSIS — J34.3 HYPERTROPHY OF INFERIOR NASAL TURBINATE: ICD-10-CM

## 2024-11-21 DIAGNOSIS — J34.3 HYPERTROPHY OF BOTH INFERIOR NASAL TURBINATES: ICD-10-CM

## 2024-11-21 LAB
25(OH)D3 SERPL-MCNC: 53 NG/ML (ref 30–100)
FOLATE SERPL-MCNC: > 20 NG/ML (ref 4.8–24.2)
VIT B12 SERPL-MCNC: 807 PG/ML (ref 211–911)

## 2024-11-21 PROCEDURE — 99214 OFFICE O/P EST MOD 30 MIN: CPT | Performed by: OTOLARYNGOLOGY

## 2024-11-21 PROCEDURE — 1123F ACP DISCUSS/DSCN MKR DOCD: CPT | Performed by: OTOLARYNGOLOGY

## 2024-11-21 PROCEDURE — 3017F COLORECTAL CA SCREEN DOC REV: CPT | Performed by: OTOLARYNGOLOGY

## 2024-11-21 PROCEDURE — 1090F PRES/ABSN URINE INCON ASSESS: CPT | Performed by: OTOLARYNGOLOGY

## 2024-11-21 PROCEDURE — 1036F TOBACCO NON-USER: CPT | Performed by: OTOLARYNGOLOGY

## 2024-11-21 PROCEDURE — G8399 PT W/DXA RESULTS DOCUMENT: HCPCS | Performed by: OTOLARYNGOLOGY

## 2024-11-21 PROCEDURE — 3074F SYST BP LT 130 MM HG: CPT | Performed by: OTOLARYNGOLOGY

## 2024-11-21 PROCEDURE — G8427 DOCREV CUR MEDS BY ELIG CLIN: HCPCS | Performed by: OTOLARYNGOLOGY

## 2024-11-21 PROCEDURE — 3078F DIAST BP <80 MM HG: CPT | Performed by: OTOLARYNGOLOGY

## 2024-11-21 PROCEDURE — 1159F MED LIST DOCD IN RCRD: CPT | Performed by: OTOLARYNGOLOGY

## 2024-11-21 PROCEDURE — G8420 CALC BMI NORM PARAMETERS: HCPCS | Performed by: OTOLARYNGOLOGY

## 2024-11-21 PROCEDURE — G8482 FLU IMMUNIZE ORDER/ADMIN: HCPCS | Performed by: OTOLARYNGOLOGY

## 2024-11-21 RX ORDER — DOXYCYCLINE 100 MG/1
CAPSULE ORAL
COMMUNITY
Start: 2024-11-11

## 2024-11-21 RX ORDER — PREDNISONE 10 MG/1
TABLET ORAL
COMMUNITY
Start: 2024-11-11

## 2024-11-21 NOTE — PROGRESS NOTES
[facility] [place] [facility]  [facility] EAR, NOSE AND THROAT  [address]  [address]  [facility] [address]  Dept: [phone]  Dept Fax: [phone]  Loc: [phone]    Radha Velazquez is a 69 y.o. female who was referred by No ref. provider found for:  Chief Complaint   Patient presents with    Other     Patient is here today to f/u after CT scan per Afua. Patient states she did the atb treatment and she is just curious what Dr. Cote has to say about everything and she is a little nervous about surgery. She is still coughing in the morning and feels like things have been about the same since seeing Afua.    .    HPI:     Radha Velazquez is a 69 y.o. female who presents today for discussion of possible surgical intervention for her nasal obstruction and chronic sinusitis.  She has had chronic nasal obstruction and sinus infections for a very long time.  She chronically mouth breathes and snores loudly.  Medical therapy has failed to help her with her symptoms.    She has 2 CTs of her sinuses 6 months apart showing similar findings.  See below: S-shaped nasal septal deviation to the left anteriorly and right more posteriorly. Hypertrophic bilateral inferior nasal turbinates more prominent to right at time of imaging. Michelle bullosa to left middle nasal turbinate and bilateral superior nasal turbinates abutting septal wall.  Bilateral ethmoid mucosal thickening worse on the left, mainly anterior.  Severe bilateral ostiomeatal obstruction with infraorbital Jessica cells.                      History:     Allergies   Allergen Reactions    Latex Rash    Citalopram      Memory loss    Codeine Other (See Comments)     restless    Lipitor [Atorvastatin]      Muscle cramps     Current Outpatient Medications   Medication Sig Dispense Refill    doxycycline hyclate (VIBRAMYCIN) 100 MG capsule       predniSONE (DELTASONE) 10 MG tablet       Vonoprazan Fumarate (VOQUEZNA) 20 MG TABS Take 1 tablet

## 2024-11-22 PROBLEM — J34.3 HYPERTROPHY OF INFERIOR NASAL TURBINATE: Status: ACTIVE | Noted: 2024-11-21

## 2024-11-22 PROBLEM — R51.9 RHINOGENIC HEADACHE: Status: ACTIVE | Noted: 2024-11-21

## 2024-11-22 PROBLEM — R06.83 LOUD SNORING: Status: ACTIVE | Noted: 2024-11-21

## 2024-11-22 PROBLEM — J32.0 CHRONIC MAXILLARY SINUSITIS: Status: ACTIVE | Noted: 2024-11-21

## 2024-11-22 PROBLEM — R06.5 MOUTH BREATHING: Status: ACTIVE | Noted: 2024-11-21

## 2024-11-22 PROBLEM — J32.2 CHRONIC ETHMOIDAL SINUSITIS: Status: ACTIVE | Noted: 2024-11-21

## 2024-11-22 PROBLEM — J34.89 OSTIOMEATAL COMPLEX OBSTRUCTION OF NASAL SINUS: Status: ACTIVE | Noted: 2024-11-21

## 2024-11-22 PROBLEM — J34.89 CONCHA BULLOSA: Status: ACTIVE | Noted: 2024-11-21

## 2024-11-22 PROBLEM — J34.2 DEVIATED NASAL SEPTUM: Status: ACTIVE | Noted: 2024-11-21

## 2024-11-24 LAB — VIT A SERPL-MCNC: NORMAL UG/ML

## 2024-11-25 LAB — PYRIDOXAL PHOS SERPL-SCNC: 126 NMOL/L (ref 20–125)

## 2025-01-06 ENCOUNTER — TELEPHONE (OUTPATIENT)
Dept: FAMILY MEDICINE CLINIC | Age: 70
End: 2025-01-06

## 2025-01-06 RX ORDER — DOXYCYCLINE HYCLATE 100 MG
100 TABLET ORAL 2 TIMES DAILY
Qty: 20 TABLET | Refills: 0 | Status: SHIPPED | OUTPATIENT
Start: 2025-01-06 | End: 2025-01-16

## 2025-01-06 RX ORDER — PREDNISONE 20 MG/1
20 TABLET ORAL 2 TIMES DAILY
Qty: 10 TABLET | Refills: 0 | Status: SHIPPED | OUTPATIENT
Start: 2025-01-06 | End: 2025-01-11

## 2025-01-06 NOTE — TELEPHONE ENCOUNTER
Rx sent to pharmacy  Daily zinc, vitamin D and C  Push water and gatorade 60-80 oz daily  Eat 5-6 small meals daily with protein in all meals  Walk every 1.5 hours for 10-15 minutes  Call or return to clinic prn if these symptoms worsen or fail to improve as anticipated.    Call patient

## 2025-01-06 NOTE — TELEPHONE ENCOUNTER
Patient called stating that she has had congestion, deep productive cough at night, sinus pressure and blocked   Pt has been using nasal rinses and Flonase, singular and zyrtec   Hasn't tried any cold med otc because she is unsure what she can take with her prescriptions     Please advise

## 2025-01-22 ENCOUNTER — TELEPHONE (OUTPATIENT)
Dept: CARDIOLOGY CLINIC | Age: 70
End: 2025-01-22

## 2025-01-22 PROBLEM — J34.3 HYPERTROPHY OF INFERIOR NASAL TURBINATE: Status: ACTIVE | Noted: 2024-11-21

## 2025-01-22 PROBLEM — J34.2 DEVIATED NASAL SEPTUM: Status: ACTIVE | Noted: 2024-11-21

## 2025-01-22 NOTE — TELEPHONE ENCOUNTER
Patient is being scheduled for a procedure with Dr Cote and we are requesting clearance from Dr. Saleh.    DOS: 03/26/2025  Procedure: Septoplasty, partial submucosal resection of bilateral inferior nasal turbinates, partial resection of kely bullosa of left middle and bilateral superior nasal turbinates, image guided nasal endoscopy with partial bilateral ethmoidectomy and bilateral maxillary antrostomy   Meds to hold:  CoQ10, multivitamin x 1 week, eliquis per cardiology instruction.

## 2025-02-25 DIAGNOSIS — I48.0 PAROXYSMAL ATRIAL FIBRILLATION (HCC): ICD-10-CM

## 2025-02-25 NOTE — TELEPHONE ENCOUNTER
Radha Velazquez called requesting a refill on the following medications:  Requested Prescriptions     Pending Prescriptions Disp Refills    apixaban (ELIQUIS) 2.5 MG TABS tablet 180 tablet 3     Sig: TAKE 1 TABLET TWICE A DAY     Pharmacy verified:    [facility] [address] [phone] - F [phone]     Date of last visit:   Date of next visit (if applicable): Visit date not found

## 2025-02-26 ENCOUNTER — PREP FOR PROCEDURE (OUTPATIENT)
Dept: ENT CLINIC | Age: 70
End: 2025-02-26

## 2025-02-26 ENCOUNTER — OFFICE VISIT (OUTPATIENT)
Dept: ENT CLINIC | Age: 70
End: 2025-02-26
Payer: MEDICARE

## 2025-02-26 VITALS
BODY MASS INDEX: 24.96 KG/M2 | DIASTOLIC BLOOD PRESSURE: 74 MMHG | HEART RATE: 74 BPM | WEIGHT: 132.2 LBS | OXYGEN SATURATION: 95 % | SYSTOLIC BLOOD PRESSURE: 126 MMHG | RESPIRATION RATE: 18 BRPM | HEIGHT: 61 IN | TEMPERATURE: 97.1 F

## 2025-02-26 DIAGNOSIS — J34.89 CONCHA BULLOSA: ICD-10-CM

## 2025-02-26 DIAGNOSIS — J32.0 CHRONIC MAXILLARY SINUSITIS: ICD-10-CM

## 2025-02-26 DIAGNOSIS — J34.3 HYPERTROPHY OF NASAL TURBINATES: ICD-10-CM

## 2025-02-26 DIAGNOSIS — J34.2 DEVIATED NASAL SEPTUM: Primary | ICD-10-CM

## 2025-02-26 DIAGNOSIS — Z01.818 PREOP TESTING: Primary | ICD-10-CM

## 2025-02-26 DIAGNOSIS — J32.2 CHRONIC ETHMOIDAL SINUSITIS: ICD-10-CM

## 2025-02-26 DIAGNOSIS — J34.3 HYPERTROPHY OF INFERIOR NASAL TURBINATE: ICD-10-CM

## 2025-02-26 DIAGNOSIS — R06.83 SNORING: ICD-10-CM

## 2025-02-26 PROCEDURE — 1036F TOBACCO NON-USER: CPT | Performed by: OTOLARYNGOLOGY

## 2025-02-26 PROCEDURE — 99213 OFFICE O/P EST LOW 20 MIN: CPT | Performed by: OTOLARYNGOLOGY

## 2025-02-26 PROCEDURE — 3078F DIAST BP <80 MM HG: CPT | Performed by: OTOLARYNGOLOGY

## 2025-02-26 PROCEDURE — G8427 DOCREV CUR MEDS BY ELIG CLIN: HCPCS | Performed by: OTOLARYNGOLOGY

## 2025-02-26 PROCEDURE — G8420 CALC BMI NORM PARAMETERS: HCPCS | Performed by: OTOLARYNGOLOGY

## 2025-02-26 PROCEDURE — G8399 PT W/DXA RESULTS DOCUMENT: HCPCS | Performed by: OTOLARYNGOLOGY

## 2025-02-26 PROCEDURE — 3017F COLORECTAL CA SCREEN DOC REV: CPT | Performed by: OTOLARYNGOLOGY

## 2025-02-26 PROCEDURE — 1090F PRES/ABSN URINE INCON ASSESS: CPT | Performed by: OTOLARYNGOLOGY

## 2025-02-26 PROCEDURE — 1123F ACP DISCUSS/DSCN MKR DOCD: CPT | Performed by: OTOLARYNGOLOGY

## 2025-02-26 PROCEDURE — 1159F MED LIST DOCD IN RCRD: CPT | Performed by: OTOLARYNGOLOGY

## 2025-02-26 PROCEDURE — 3074F SYST BP LT 130 MM HG: CPT | Performed by: OTOLARYNGOLOGY

## 2025-02-26 NOTE — PROGRESS NOTES
[facility] [place] [facility]  [facility] EAR, NOSE AND THROAT  [address]  [address]  [facility] [address]  Dept: [phone]  Dept Fax: [phone]  Loc: [phone]    Radha Velazquez is a 69 y.o. female who was referred byNo ref. provider found for:  Chief Complaint   Patient presents with    Other     Patient is here today to discuss surgical plan, 3/26, septo, smr, kely, igs, ethmoid, max. Patient states she has a little bit of a headache but other than that she states that everything else has been about the same.   .    HPI:     Radha Velazquez is a 69 y.o. female who presents today for discussion of possible surgical intervention for her nasal obstruction and chronic sinusitis. She has had chronic nasal obstruction and sinus infections for a very long time. She chronically mouth breathes and snores loudly. Medical therapy has failed to help her with her symptoms. Pt with paroxysmal A-fib and had a medical clearance with a moderate risk. CT sinus 5/2024: Stable CT scan of the sinuses, no interval change since previous study dated 12/14/2023. Moderate mucosal thickening in the left and mild mucosal thickening in the right ethmoid air cells. Small retention cyst or polyp in the left maxillary sinus. There is narrowing of the ostium of the left maxillary sinus. 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 The nasal septum is slightly deviated towards the right side.     History:     Allergies   Allergen Reactions    Latex Rash    Citalopram      Memory loss    Codeine Other (See Comments)     restless    Lipitor [Atorvastatin]      Muscle cramps     Current Outpatient Medications   Medication Sig Dispense Refill    apixaban (ELIQUIS) 2.5 MG TABS tablet TAKE 1 TABLET TWICE A  tablet 3    Vonoprazan Fumarate (VOQUEZNA) 20 MG TABS Take 1 tablet by mouth daily 30 tablet 0    estradiol (ESTRACE) 0.1 MG/GM vaginal cream       pitavastatin (LIVALO) 2 MG TABS tablet TAKE 1 TABLET

## 2025-03-14 ENCOUNTER — TELEPHONE (OUTPATIENT)
Dept: ENT CLINIC | Age: 70
End: 2025-03-14

## 2025-03-14 NOTE — TELEPHONE ENCOUNTER
Patient would like to cancel case at this time. She said she just wants to think about things a bit longer. She will cb prn.

## 2025-04-09 DIAGNOSIS — K21.9 GASTROESOPHAGEAL REFLUX DISEASE WITHOUT ESOPHAGITIS: Primary | ICD-10-CM

## 2025-04-09 NOTE — TELEPHONE ENCOUNTER
Voquezna 20mg    [facility]  Insurance response time is 7-14 business days.

## 2025-04-09 NOTE — TELEPHONE ENCOUNTER
Pt called stating she switched insurances January 1st and now they are not going to be covering her Voquezna anymore. They told her it is not medically necessary. She is going to bring the letter up so we can see it, but wanted to make you aware.

## 2025-04-09 NOTE — TELEPHONE ENCOUNTER
Pt said is too soon to tell and wants to continue with new med for two more months then re evaluate.

## 2025-04-10 ENCOUNTER — TELEPHONE (OUTPATIENT)
Dept: FAMILY MEDICINE CLINIC | Age: 70
End: 2025-04-10

## 2025-04-10 RX ORDER — VONOPRAZAN FUMARATE 26.72 MG/1
20 TABLET ORAL DAILY
Qty: 15 TABLET | Refills: 0 | Status: SHIPPED | COMMUNITY
Start: 2025-04-10

## 2025-04-10 RX ORDER — VONOPRAZAN FUMARATE 13.36 MG/1
20 TABLET ORAL DAILY
Qty: 30 TABLET | Refills: 0 | Status: SHIPPED | COMMUNITY
Start: 2025-04-10

## 2025-04-10 NOTE — TELEPHONE ENCOUNTER
Voquenza    VOQUEZNA TAB 20MG is denied because it is not on your plan's Drug List (formulary). Medication authorization requires the following: (1) You need to try four (4) of these covered drugs:   (a) Dexlansoprazole. (b) Esomeprazole magnesium capsule. (c) Lansoprazole. (d) Pantoprazole tablet. (e) Rabeprazole.   (2) OR your doctor needs to give us specific medical reasons why four (4) of the covered drug(s) are not appropriate for you. Reviewed by: Mati    Please advise.  Is there specific reasons patient can not take formulary medications?

## 2025-05-27 SDOH — ECONOMIC STABILITY: FOOD INSECURITY: WITHIN THE PAST 12 MONTHS, THE FOOD YOU BOUGHT JUST DIDN'T LAST AND YOU DIDN'T HAVE MONEY TO GET MORE.: NEVER TRUE

## 2025-05-27 SDOH — ECONOMIC STABILITY: INCOME INSECURITY: IN THE LAST 12 MONTHS, WAS THERE A TIME WHEN YOU WERE NOT ABLE TO PAY THE MORTGAGE OR RENT ON TIME?: NO

## 2025-05-27 SDOH — ECONOMIC STABILITY: TRANSPORTATION INSECURITY
IN THE PAST 12 MONTHS, HAS THE LACK OF TRANSPORTATION KEPT YOU FROM MEDICAL APPOINTMENTS OR FROM GETTING MEDICATIONS?: NO

## 2025-05-27 SDOH — ECONOMIC STABILITY: FOOD INSECURITY: WITHIN THE PAST 12 MONTHS, YOU WORRIED THAT YOUR FOOD WOULD RUN OUT BEFORE YOU GOT MONEY TO BUY MORE.: NEVER TRUE

## 2025-05-27 SDOH — ECONOMIC STABILITY: TRANSPORTATION INSECURITY
IN THE PAST 12 MONTHS, HAS LACK OF TRANSPORTATION KEPT YOU FROM MEETINGS, WORK, OR FROM GETTING THINGS NEEDED FOR DAILY LIVING?: NO

## 2025-05-28 ENCOUNTER — OFFICE VISIT (OUTPATIENT)
Dept: FAMILY MEDICINE CLINIC | Age: 70
End: 2025-05-28

## 2025-05-28 VITALS
SYSTOLIC BLOOD PRESSURE: 124 MMHG | BODY MASS INDEX: 24.59 KG/M2 | OXYGEN SATURATION: 96 % | TEMPERATURE: 97.1 F | RESPIRATION RATE: 16 BRPM | WEIGHT: 132.28 LBS | DIASTOLIC BLOOD PRESSURE: 72 MMHG | HEART RATE: 72 BPM

## 2025-05-28 DIAGNOSIS — M54.41 CHRONIC BILATERAL LOW BACK PAIN WITH RIGHT-SIDED SCIATICA: ICD-10-CM

## 2025-05-28 DIAGNOSIS — Z78.0 POSTMENOPAUSAL: ICD-10-CM

## 2025-05-28 DIAGNOSIS — M25.551 RIGHT HIP PAIN: ICD-10-CM

## 2025-05-28 DIAGNOSIS — J30.2 SEASONAL ALLERGIES: ICD-10-CM

## 2025-05-28 DIAGNOSIS — K21.9 GASTROESOPHAGEAL REFLUX DISEASE WITHOUT ESOPHAGITIS: ICD-10-CM

## 2025-05-28 DIAGNOSIS — L30.1 DYSHIDROSIS: ICD-10-CM

## 2025-05-28 DIAGNOSIS — I10 ESSENTIAL HYPERTENSION: Primary | ICD-10-CM

## 2025-05-28 DIAGNOSIS — M48.02 CERVICAL STENOSIS OF SPINE: ICD-10-CM

## 2025-05-28 DIAGNOSIS — K43.2 INCISIONAL HERNIA, WITHOUT OBSTRUCTION OR GANGRENE: ICD-10-CM

## 2025-05-28 DIAGNOSIS — M85.80 OSTEOPENIA DETERMINED BY X-RAY: ICD-10-CM

## 2025-05-28 DIAGNOSIS — I48.0 PAROXYSMAL ATRIAL FIBRILLATION (HCC): ICD-10-CM

## 2025-05-28 DIAGNOSIS — R53.83 OTHER FATIGUE: ICD-10-CM

## 2025-05-28 DIAGNOSIS — R35.0 URINARY FREQUENCY: ICD-10-CM

## 2025-05-28 DIAGNOSIS — I34.1 MVP (MITRAL VALVE PROLAPSE): ICD-10-CM

## 2025-05-28 DIAGNOSIS — G89.29 CHRONIC BILATERAL LOW BACK PAIN WITH RIGHT-SIDED SCIATICA: ICD-10-CM

## 2025-05-28 DIAGNOSIS — E50.9 VITAMIN A DEFICIENCY: ICD-10-CM

## 2025-05-28 DIAGNOSIS — J33.0 POLYP OF NASAL CAVITY: ICD-10-CM

## 2025-05-28 DIAGNOSIS — E78.00 PURE HYPERCHOLESTEROLEMIA: ICD-10-CM

## 2025-05-28 DIAGNOSIS — S83.512A RUPTURE OF ANTERIOR CRUCIATE LIGAMENT OF LEFT KNEE, INITIAL ENCOUNTER: ICD-10-CM

## 2025-05-28 DIAGNOSIS — R31.29 OTHER MICROSCOPIC HEMATURIA: ICD-10-CM

## 2025-05-28 DIAGNOSIS — K59.09 OTHER CONSTIPATION: ICD-10-CM

## 2025-05-28 DIAGNOSIS — M15.0 PRIMARY OSTEOARTHRITIS INVOLVING MULTIPLE JOINTS: ICD-10-CM

## 2025-05-28 DIAGNOSIS — R10.84 GENERALIZED ABDOMINAL PAIN: ICD-10-CM

## 2025-05-28 RX ORDER — RIBOFLAVIN (VITAMIN B2) 100 MG
1 TABLET ORAL DAILY
Qty: 90 TABLET | Refills: 3 | Status: SHIPPED | OUTPATIENT
Start: 2025-05-28

## 2025-05-28 RX ORDER — VONOPRAZAN FUMARATE 26.72 MG/1
1 TABLET ORAL DAILY
Qty: 90 TABLET | Refills: 3 | Status: SHIPPED | OUTPATIENT
Start: 2025-05-28

## 2025-05-28 RX ORDER — FAMOTIDINE 20 MG/1
TABLET, FILM COATED ORAL
COMMUNITY
Start: 2025-01-22

## 2025-05-28 SDOH — ECONOMIC STABILITY: FOOD INSECURITY: WITHIN THE PAST 12 MONTHS, YOU WORRIED THAT YOUR FOOD WOULD RUN OUT BEFORE YOU GOT MONEY TO BUY MORE.: NEVER TRUE

## 2025-05-28 SDOH — ECONOMIC STABILITY: FOOD INSECURITY: WITHIN THE PAST 12 MONTHS, THE FOOD YOU BOUGHT JUST DIDN'T LAST AND YOU DIDN'T HAVE MONEY TO GET MORE.: NEVER TRUE

## 2025-05-28 ASSESSMENT — PATIENT HEALTH QUESTIONNAIRE - PHQ9
SUM OF ALL RESPONSES TO PHQ QUESTIONS 1-9: 0
SUM OF ALL RESPONSES TO PHQ QUESTIONS 1-9: 0
1. LITTLE INTEREST OR PLEASURE IN DOING THINGS: NOT AT ALL
SUM OF ALL RESPONSES TO PHQ QUESTIONS 1-9: 0
SUM OF ALL RESPONSES TO PHQ QUESTIONS 1-9: 0
2. FEELING DOWN, DEPRESSED OR HOPELESS: NOT AT ALL

## 2025-05-28 NOTE — PROGRESS NOTES
Faxed office for last note.   
  Patient Position: Sitting   BP Cuff Size: Large Adult   Pulse: 72   Resp: 16   Temp: 97.1 °F (36.2 °C)   TempSrc: Temporal   SpO2: 96%   Weight: 60 kg (132 lb 4.4 oz)     Wt Readings from Last 3 Encounters:   05/28/25 60 kg (132 lb 4.4 oz)   02/26/25 60 kg (132 lb 3.2 oz)   11/21/24 60.2 kg (132 lb 12.8 oz)       Physical Exam  Physical Exam   Constitutional: Vital signs are normal. She appears well-developed and well-nourished. She is active.   HENT:   Head: Normocephalic and atraumatic.   Right Ear: Tympanic membrane, external ear and ear canal normal. No drainage or tenderness.   Left Ear: Tympanic membrane, external ear and ear canal normal. No drainage or tenderness.   Nose: Nose normal. No mucosal edema or rhinorrhea.   Mouth/Throat: Uvula is midline, oropharynx is clear and moist and mucous membranes are normal. Mucous membranes are not pale. Normal dentition. No posterior oropharyngeal edema or posterior oropharyngeal erythema.   Eyes: Lids are normal. Right eye exhibits no chemosis and no discharge. Left eye exhibits no chemosis and no drainage. Right conjunctiva has no hemorrhage. Left conjunctiva has no hemorrhage. Right eye exhibits normal extraocular motion. Left eye exhibits normal extraocular motion. Right pupil is round and reactive. Left pupil is round and reactive. Pupils are equal.   Cardiovascular: Normal rate, regular rhythm, S1 normal, S2 normal and normal heart sounds.  Exam reveals no gallop.    No murmur heard.  Pulmonary/Chest: Effort normal and breath sounds normal. No respiratory distress. She has no wheezes. She has no rhonchi. She has no rales.   Abdominal: Soft. Normal appearance and bowel sounds are normal. She exhibits no distension and no mass. There is no hepatosplenomegaly. No tenderness. She has no rigidity, no rebound and no guarding. No hernia.   Musculoskeletal:        Right lower leg: She exhibits no edema.        Left lower leg: She exhibits no edema.   Neurological: She

## 2025-07-11 ENCOUNTER — LAB (OUTPATIENT)
Dept: LAB | Age: 70
End: 2025-07-11

## 2025-07-11 DIAGNOSIS — R53.83 OTHER FATIGUE: ICD-10-CM

## 2025-07-11 DIAGNOSIS — E78.00 PURE HYPERCHOLESTEROLEMIA: ICD-10-CM

## 2025-07-11 DIAGNOSIS — K21.9 GASTROESOPHAGEAL REFLUX DISEASE WITHOUT ESOPHAGITIS: ICD-10-CM

## 2025-07-11 DIAGNOSIS — I10 ESSENTIAL HYPERTENSION: ICD-10-CM

## 2025-07-11 DIAGNOSIS — E50.9 VITAMIN A DEFICIENCY: ICD-10-CM

## 2025-07-11 LAB
25(OH)D3 SERPL-MCNC: 47 NG/ML (ref 30–100)
ALBUMIN SERPL BCG-MCNC: 4.1 G/DL (ref 3.4–4.9)
ALP SERPL-CCNC: 56 U/L (ref 38–126)
ALT SERPL W/O P-5'-P-CCNC: 27 U/L (ref 10–35)
ANION GAP SERPL CALC-SCNC: 12 MEQ/L (ref 8–16)
AST SERPL-CCNC: 23 U/L (ref 10–35)
BILIRUB SERPL-MCNC: 0.4 MG/DL (ref 0.3–1.2)
BUN SERPL-MCNC: 13 MG/DL (ref 8–23)
CALCIUM SERPL-MCNC: 9.4 MG/DL (ref 8.8–10.2)
CHLORIDE SERPL-SCNC: 104 MEQ/L (ref 98–111)
CHOLEST SERPL-MCNC: 205 MG/DL (ref 100–199)
CO2 SERPL-SCNC: 25 MEQ/L (ref 22–29)
CREAT SERPL-MCNC: 0.7 MG/DL (ref 0.5–0.9)
DEPRECATED RDW RBC AUTO: 40.9 FL (ref 35–45)
ERYTHROCYTE [DISTWIDTH] IN BLOOD BY AUTOMATED COUNT: 12.4 % (ref 11.5–14.5)
FERRITIN SERPL IA-MCNC: 40 NG/ML (ref 13–150)
FOLATE SERPL-MCNC: > 20 NG/ML (ref 4.6–34.8)
GFR SERPL CREATININE-BSD FRML MDRD: > 90 ML/MIN/1.73M2
GLUCOSE FASTING: 90 MG/DL (ref 74–109)
HCT VFR BLD AUTO: 42.7 % (ref 37–47)
HDLC SERPL-MCNC: 66 MG/DL
HGB BLD-MCNC: 14.4 GM/DL (ref 12–16)
IRON SATN MFR SERPL: 40 % (ref 20–50)
IRON SERPL-MCNC: 116 UG/DL (ref 37–145)
LDLC SERPL CALC-MCNC: 118 MG/DL
MCH RBC QN AUTO: 30.7 PG (ref 26–33)
MCHC RBC AUTO-ENTMCNC: 33.7 GM/DL (ref 32.2–35.5)
MCV RBC AUTO: 91 FL (ref 81–99)
PLATELET # BLD AUTO: 242 THOU/MM3 (ref 130–400)
PMV BLD AUTO: 9.1 FL (ref 9.4–12.4)
POTASSIUM SERPL-SCNC: 4.3 MEQ/L (ref 3.5–5.2)
PROT SERPL-MCNC: 6.8 G/DL (ref 6.4–8.3)
RBC # BLD AUTO: 4.69 MILL/MM3 (ref 4.2–5.4)
SODIUM SERPL-SCNC: 141 MEQ/L (ref 135–145)
TIBC SERPL-MCNC: 288 UG/DL (ref 171–450)
TRIGL SERPL-MCNC: 104 MG/DL (ref 0–199)
TSH SERPL DL<=0.05 MIU/L-ACNC: 1.33 UIU/ML (ref 0.27–4.2)
VIT B12 SERPL-MCNC: 703 PG/ML (ref 232–1245)
WBC # BLD AUTO: 4.9 THOU/MM3 (ref 4.8–10.8)

## 2025-07-13 ENCOUNTER — RESULTS FOLLOW-UP (OUTPATIENT)
Dept: FAMILY MEDICINE CLINIC | Age: 70
End: 2025-07-13

## 2025-07-15 LAB — VIT A SERPL-MCNC: NORMAL UG/ML

## 2025-08-14 ENCOUNTER — HOSPITAL ENCOUNTER (OUTPATIENT)
Dept: GENERAL RADIOLOGY | Age: 70
Discharge: HOME | End: 2025-08-14
Payer: MEDICARE

## 2025-08-14 ENCOUNTER — OFFICE VISIT (OUTPATIENT)
Dept: FAMILY MEDICINE CLINIC | Age: 70
End: 2025-08-14

## 2025-08-14 VITALS
BODY MASS INDEX: 25.02 KG/M2 | TEMPERATURE: 97.8 F | DIASTOLIC BLOOD PRESSURE: 72 MMHG | WEIGHT: 132.5 LBS | RESPIRATION RATE: 16 BRPM | HEART RATE: 76 BPM | OXYGEN SATURATION: 98 % | HEIGHT: 61 IN | SYSTOLIC BLOOD PRESSURE: 118 MMHG

## 2025-08-14 DIAGNOSIS — R10.9 RIGHT FLANK PAIN: ICD-10-CM

## 2025-08-14 DIAGNOSIS — R10.11 RUQ PAIN: Primary | ICD-10-CM

## 2025-08-14 DIAGNOSIS — R10.11 RUQ PAIN: ICD-10-CM

## 2025-08-14 DIAGNOSIS — K59.09 OTHER CONSTIPATION: ICD-10-CM

## 2025-08-14 DIAGNOSIS — R35.0 URINARY FREQUENCY: ICD-10-CM

## 2025-08-14 LAB
BILIRUBIN, POC: NEGATIVE
BLOOD URINE, POC: NEGATIVE
CLARITY, POC: CLEAR
COLOR, POC: YELLOW
DEPRECATED RDW RBC AUTO: 42.1 FL (ref 35–45)
ERYTHROCYTE [DISTWIDTH] IN BLOOD BY AUTOMATED COUNT: 12.3 % (ref 11.5–14.5)
GLUCOSE URINE, POC: NEGATIVE MG/DL
HCT VFR BLD AUTO: 45 % (ref 37–47)
HGB BLD-MCNC: 14.9 GM/DL (ref 12–16)
KETONES, POC: NEGATIVE MG/DL
LEUKOCYTE EST, POC: NEGATIVE
MCH RBC QN AUTO: 30.7 PG (ref 26–33)
MCHC RBC AUTO-ENTMCNC: 33.1 GM/DL (ref 32.2–35.5)
MCV RBC AUTO: 92.6 FL (ref 81–99)
NITRITE, POC: NEGATIVE
PH, POC: 6.5
PLATELET # BLD AUTO: 242 THOU/MM3 (ref 130–400)
PMV BLD AUTO: 9.2 FL (ref 9.4–12.4)
PROTEIN, POC: NEGATIVE MG/DL
RBC # BLD AUTO: 4.86 MILL/MM3 (ref 4.2–5.4)
SPECIFIC GRAVITY, POC: 1.01
UROBILINOGEN, POC: 0.2 MG/DL
WBC # BLD AUTO: 6.3 THOU/MM3 (ref 4.8–10.8)

## 2025-08-14 PROCEDURE — 74019 RADEX ABDOMEN 2 VIEWS: CPT

## 2025-08-14 RX ORDER — AZELASTINE HYDROCHLORIDE 137 UG/1
SPRAY, METERED NASAL
COMMUNITY
Start: 2025-08-08

## 2025-08-15 LAB
ALBUMIN SERPL BCG-MCNC: 4.2 G/DL (ref 3.4–4.9)
ALP SERPL-CCNC: 65 U/L (ref 38–126)
ALT SERPL W/O P-5'-P-CCNC: 24 U/L (ref 10–35)
AMYLASE SERPL-CCNC: 70 U/L (ref 28–100)
ANION GAP SERPL CALC-SCNC: 9 MEQ/L (ref 8–16)
AST SERPL-CCNC: 21 U/L (ref 10–35)
BILIRUB SERPL-MCNC: 0.3 MG/DL (ref 0.3–1.2)
BUN SERPL-MCNC: 15 MG/DL (ref 8–23)
CALCIUM SERPL-MCNC: 9.2 MG/DL (ref 8.5–10.5)
CHLORIDE SERPL-SCNC: 104 MEQ/L (ref 98–111)
CO2 SERPL-SCNC: 29 MEQ/L (ref 22–29)
CREAT SERPL-MCNC: 0.7 MG/DL (ref 0.5–0.9)
GFR SERPL CREATININE-BSD FRML MDRD: > 90 ML/MIN/1.73M2
GLUCOSE FASTING: 107 MG/DL (ref 74–109)
LIPASE SERPL-CCNC: 56 U/L (ref 13–60)
POTASSIUM SERPL-SCNC: 3.6 MEQ/L (ref 3.5–5.2)
PROT SERPL-MCNC: 6.9 G/DL (ref 6.4–8.3)
SODIUM SERPL-SCNC: 142 MEQ/L (ref 135–145)